# Patient Record
Sex: MALE | Race: BLACK OR AFRICAN AMERICAN
[De-identification: names, ages, dates, MRNs, and addresses within clinical notes are randomized per-mention and may not be internally consistent; named-entity substitution may affect disease eponyms.]

---

## 2017-12-09 ENCOUNTER — HOSPITAL ENCOUNTER (INPATIENT)
Dept: HOSPITAL 74 - YASAS | Age: 52
LOS: 4 days | Discharge: HOME | End: 2017-12-13
Attending: INTERNAL MEDICINE | Admitting: INTERNAL MEDICINE
Payer: COMMERCIAL

## 2017-12-09 VITALS — BODY MASS INDEX: 21.8 KG/M2

## 2017-12-09 DIAGNOSIS — J45.20: ICD-10-CM

## 2017-12-09 DIAGNOSIS — Z79.84: ICD-10-CM

## 2017-12-09 DIAGNOSIS — F10.24: ICD-10-CM

## 2017-12-09 DIAGNOSIS — E11.9: ICD-10-CM

## 2017-12-09 DIAGNOSIS — F10.230: Primary | ICD-10-CM

## 2017-12-09 DIAGNOSIS — F17.213: ICD-10-CM

## 2017-12-09 PROCEDURE — HZ2ZZZZ DETOXIFICATION SERVICES FOR SUBSTANCE ABUSE TREATMENT: ICD-10-PCS | Performed by: INTERNAL MEDICINE

## 2017-12-09 RX ADMIN — Medication SCH: at 23:22

## 2017-12-09 RX ADMIN — NICOTINE SCH MG: 21 PATCH TRANSDERMAL at 14:05

## 2017-12-09 RX ADMIN — METFORMIN HYDROCHLORIDE SCH: 500 TABLET ORAL at 23:22

## 2017-12-09 RX ADMIN — METFORMIN HYDROCHLORIDE SCH MG: 500 TABLET ORAL at 14:03

## 2017-12-09 NOTE — HP
CIWA Score





- CIWA Score


Nausea/Vomitin-Mild Nausea/No Vomiting


Muscle Tremors: 4-Moderate,w/Arms Extend


Anxiety: 4-Mod. Anxious/Guarded


Agitation: 1-Slight > Activity


Paroxysmal Sweats: 1-Minimal Palms Moist


Orientation: 2-Disoriented Date<2 days


Tacttile Disturbances: 1-Very Mild Itch/Numbness


Auditory Disturbances: 1-Very Mild


Visual Disturbances: 1-Very Mild Sensitivity


Headache: 2-Mild


CIWA-Ar Total Score: 18





Admission ROS S





- HPI


Chief Complaint: 


I need detox, I want to stop drinking


Allergies/Adverse Reactions: 


 Allergies











Allergy/AdvReac Type Severity Reaction Status Date / Time


 


No Known Allergies Allergy   Verified 17 12:13











History of Present Illness: 


53 yo gentleman here for detox from alcohol - unsure of when/where his last 

detox is, has black outs.  Was in [facility] ED for a fall - treated and sent for 

detox - laceration left eyebrow with steri-strips.  


Exam Limitations: Clinical Condition





- Ebola screening


Have you traveled outside of the country in the last 21 days: No


Have you had contact with anyone from an Ebola affected area: No


Have you been sick,other than usual withdrawal symptoms: No


Do you have a fever: No





- Review of Systems


Constitutional: Loss of Appetite, Malaise, Changes in sleep, Weakness


EENT: reports: Blurred Vision


Respiratory: reports: Cough


Cardiac: reports: No Symptoms Reported


GI: reports: Indigestion


: reports: Frequency


Musculoskeletal: reports: Back Pain, Muscle Pain


Integumentary: reports: Dryness


Neuro: reports: Headache


Endocrine: reports: No Symptoms Reported


Hematology: reports: No Symptoms Reported


Psychiatric: reports: Judgement Intact, Mood/Affect Appropiate, Anxious


Other Systems: Reviewed and Negative





Patient History





- Patient Medical History


Hx Anemia: No


Hx Asthma: Yes (ON ALBUTEROL)


Hx Chronic Obstructive Pulmonary Disease (COPD): No


Hx Cancer: No


Hx Cardiac Disorders: No


Hx Congestive Heart Failure: No


Hx Hypertension: No


Hx Hypercholesterolemia: No


Hx Pacemaker: No


HX Cerebrovascular Accident: No


Hx Seizures: No


Hx Dementia: No


Hx Diabetes: Yes


Hx Gastrointestinal Disorders: No


Hx Liver Disease: No


Hx Genitourinary Disorders: No


Hx Sexually Transmitted Disorders: No


Hx Renal Disease (ESRD): No


Hx Thyroid Disease: No


Hx Human Immunodeficiency Virus (HIV): No


Hx Hepatitis C: No


Hx Depression: Yes (not on meds)


Hx Suicide Attempt: No


Hx Bipolar Disorder: No


Hx Schizophrenia: No





- Patient Surgical History


Past Surgical History: No


Hx Neurologic Surgery: No


Hx Cataract Extraction: No


Hx Cardiac Surgery: No


Hx Lung Surgery: No


Hx Breast Surgery: No


Hx Breast Biopsy: No


Hx Abdominal Surgery: Yes (hernia as a child)


Hx Appendectomy: No


Hx Cholecystectomy: No


Hx Genitourinary Surgery: No


Hx  Section: No


Hx Orthopedic Surgery: No


Anesthesia Reaction: No





- PPD History


Previous Implant?: Yes


Documented Results: Negative w/proof


Date: 14


PPD to be Administered?: Yes





- Reproductive History


Patient is a Female of Child Bearing Age (11 -55 yrs old): No (male)





- Smoking Cessation


Smoking history: Current every day smoker


Have you smoked in the past 12 months: Yes


Aproximately how many cigarettes per day: 20


Cigars Per Day: 0


Hx Chewing Tobacco Use: No


Initiated information on smoking cessation: Yes


'Breaking Loose' booklet given: 17 (give on floor)





- Substance & Tx. History


Hx Alcohol Use: Yes


Hx Substance Use: No


Substance Use Type: Alcohol


Hx Substance Use Treatment: Yes





- Substances Abused


  ** Alcohol


Route: Oral


Frequency: Daily


Amount used: 2 pints liquor; two six packs of sixteen oz beers


Age of first use: 11


Date of Last Use: 17





Family Disease History





- Family Disease History


Family Disease History: Diabetes: Sister ( -  MVA), Other: Father (

etoh, , asthma), Mother (living, healthy), Sister





Admission Physical Exam [facility]





- Vital Signs


Vital Signs: 


 Vital Signs - 24 hr











  17





  11:23


 


Temperature 96.6 F L


 


Pulse Rate 97 H


 


Respiratory 18





Rate 


 


Blood Pressure 139/87














- Physical


General Appearance: Yes: Nourished, Appropriately Dressed, Mild Distress


HEENTM: Yes: Hearing grossly Normal, Normocephalic, Muffled/Hoarse Voice (low, 

muffled voice - states he has always been that way), Other (left eyebrow 

laceration/steristrips, conjunctiva mildly red)


Respiratory: Yes: No Respiratory Distress, Rhonchi


Neck: Yes: No masses,lesions,Nodules


Breast: Yes: Breast Exam Deferred


Cardiology: Yes: Regular Rhythm, Regular Rate


Abdominal: Yes: Flat


Genitourinary: Yes: Frequency


Back: Yes: Decreased Range of Motion, Other (kyphosis)


Musculoskeletal: Yes: Back pain


Extremities: Yes: Normal Inspection, Non-Tender


Neurological: Yes: Alert, Normal Mood/Affect, Normal Response


Integumentary: Yes: Normal Color, Dry


Lymphatic: Yes: Within Normal Limits





- Addiitonal


Findings: 


pfg=[id number]





- Diagnostic


(1) Alcohol dependence


Current Visit: Yes   Status: Acute   


Qualifiers: 


   Substance use status: uncomplicated   Qualified Code(s): F10.20 - Alcohol 

dependence, uncomplicated   





(2) Asthma


Current Visit: Yes   Status: Acute   


Qualifiers: 


   Asthma severity: moderate   Asthma persistence: persistent 





(3) DM (diabetes mellitus), type 2


Current Visit: Yes   Status: Acute   


Qualifiers: 


   Diabetes mellitus complication status: without complication   Diabetes 

mellitus long term insulin use: without long term use   Qualified Code(s): 

E11.9 - Type 2 diabetes mellitus without complications   





(4) Nicotine dependence


Current Visit: Yes   Status: Acute   


Qualifiers: 


   Nicotine product type: cigarettes   Substance use status: uncomplicated   

Qualified Code(s): F17.210 - Nicotine dependence, cigarettes, uncomplicated   





Cleared for Admission [facility]





- Detox or Rehab


[facility] Level of Care: Medically Managed


Detox Regimen/Protocol: Librium





[facility] Breath Alcohol Content


Breath Alcohol Content: 0.134





Urine Drug Screen





- Results


Drug Screen Negative: No


Urine Drug Screen Results: BZO-Benzodiazepines

## 2017-12-10 LAB
ALBUMIN SERPL-MCNC: 3.2 G/DL (ref 3.4–5)
ALP SERPL-CCNC: 50 U/L (ref 45–117)
ALT SERPL-CCNC: 21 U/L (ref 12–78)
ANION GAP SERPL CALC-SCNC: 3 MMOL/L (ref 8–16)
APPEARANCE UR: (no result)
AST SERPL-CCNC: 8 U/L (ref 15–37)
BILIRUB SERPL-MCNC: 0.4 MG/DL (ref 0.2–1)
BILIRUB UR STRIP.AUTO-MCNC: NEGATIVE MG/DL
CALCIUM SERPL-MCNC: 8.8 MG/DL (ref 8.5–10.1)
CO2 SERPL-SCNC: 37 MMOL/L (ref 21–32)
COLOR UR: (no result)
CREAT SERPL-MCNC: 0.7 MG/DL (ref 0.7–1.3)
DEPRECATED RDW RBC AUTO: 15.5 % (ref 11.9–15.9)
GLUCOSE SERPL-MCNC: 92 MG/DL (ref 74–106)
HIV 1 & 2 AB: NEGATIVE
HIV 1 AGP24: NEGATIVE
KETONES UR QL STRIP: (no result)
LEUKOCYTE ESTERASE UR QL STRIP.AUTO: NEGATIVE
LEUKOCYTE ESTERASE UR QL STRIP: NEGATIVE
MCH RBC QN AUTO: 28.2 PG (ref 25.7–33.7)
MCHC RBC AUTO-ENTMCNC: 31.9 G/DL (ref 32–35.9)
MCV RBC: 88.5 FL (ref 80–96)
NITRITE UR QL STRIP: NEGATIVE
PH UR: 5 [PH] (ref 5–8)
PLATELET # BLD AUTO: 317 K/MM3 (ref 134–434)
PMV BLD: 9.4 FL (ref 7.5–11.1)
PROT SERPL-MCNC: 5.9 G/DL (ref 6.4–8.2)
PROT UR QL STRIP: NEGATIVE
PROT UR QL STRIP: NEGATIVE
RBC # UR STRIP: NEGATIVE /UL
SP GR UR: 1.02 (ref 1–1.03)
UROBILINOGEN UR STRIP-MCNC: 2 MG/DL (ref 0.2–1)
WBC # BLD AUTO: 8.8 K/MM3 (ref 4–10)

## 2017-12-10 RX ADMIN — Medication SCH MG: at 22:42

## 2017-12-10 RX ADMIN — METFORMIN HYDROCHLORIDE SCH MG: 500 TABLET ORAL at 22:42

## 2017-12-10 RX ADMIN — NICOTINE SCH: 21 PATCH TRANSDERMAL at 11:10

## 2017-12-10 RX ADMIN — Medication SCH TAB: at 11:10

## 2017-12-10 RX ADMIN — METFORMIN HYDROCHLORIDE SCH MG: 500 TABLET ORAL at 11:11

## 2017-12-10 NOTE — EKG
Test Reason : 

Blood Pressure : ***/*** mmHG

Vent. Rate : 088 BPM     Atrial Rate : 088 BPM

   P-R Int : 114 ms          QRS Dur : 082 ms

    QT Int : 366 ms       P-R-T Axes : 070 074 063 degrees

   QTc Int : 442 ms

 

NORMAL SINUS RHYTHM WITH SHORT WV

NONSPECIFIC T WAVE ABNORMALITY

NO PREVIOUS ECGS AVAILABLE

Confirmed by ANNALEE CALDERON MD ([id number]) on 12/10/2017 8:57:08 AM

 

Referred By:             Confirmed By:ANNALEE CALDERON MD

## 2017-12-10 NOTE — PN
S CIWA





- CIWA Score


Nausea/Vomitin-No Nausea/No Vomiting


Muscle Tremors: 4-Moderate,w/Arms Extend


Anxiety: 4-Mod. Anxious/Guarded


Agitation: 4-Moderately Restless


Paroxysmal Sweats: 3


Orientation: 0-Oriented


Tacttile Disturbances: 0-None


Auditory Disturbances: 0-None


Visual Disturbances: 0-None


Headache: 3-Moderate


CIWA-Ar Total Score: 18





BHS Progress Note (SOAP)


Subjective: 





Tremor, sweating, headache, interrupted sleep


Objective: 





12/10/17 13:10


 Last Vital Signs











Temp Pulse Resp BP Pulse Ox


 


 97.1 F L  122 H  18   127/78    


 


 12/10/17 10:32  12/10/17 10:32  12/10/17 10:32  12/10/17 10:32   





pulse 122: tachycardia


 Laboratory Tests











  17





  12:25 14:30 22:00


 


WBC   


 


RBC   


 


Hgb   


 


Hct   


 


MCV   


 


MCH   


 


MCHC   


 


RDW   


 


Plt Count   


 


MPV   


 


Sodium   


 


Potassium   


 


Chloride   


 


Carbon Dioxide   


 


Anion Gap   


 


BUN   


 


Creatinine   


 


Creat Clearance w eGFR   


 


POC Glucometer  112   109


 


Random Glucose   


 


Calcium   


 


Total Bilirubin   


 


AST   


 


ALT   


 


Alkaline Phosphatase   


 


Total Protein   


 


Albumin   


 


Urine Color   Dkyellow 


 


Urine Appearance   Slcloudy 


 


Urine pH   5.0 


 


Ur Specific Gravity   1.025 


 


Urine Protein   Negative 


 


Urine Glucose (UA)   Negative 


 


Urine Ketones   Trace H 


 


Urine Blood   Negative 


 


Urine Nitrite   Negative 


 


Urine Bilirubin   Negative 


 


Urine Urobilinogen   2.0 


 


RPR Titer   


 


HIV 1&2 Antibody Screen   


 


HIV P24 Antigen   














  12/10/17 12/10/17 12/10/17





  06:13 08:00 08:00


 


WBC   8.8 


 


RBC   4.72 


 


Hgb   13.3 


 


Hct   41.8 


 


MCV   88.5 


 


MCH   28.2 


 


MCHC   31.9 L 


 


RDW   15.5 


 


Plt Count   317  D 


 


MPV   9.4 


 


Sodium    143


 


Potassium    4.0


 


Chloride    103


 


Carbon Dioxide    37 H


 


Anion Gap    3 L


 


BUN    23 H


 


Creatinine    0.7


 


Creat Clearance w eGFR    > 60


 


POC Glucometer  115  


 


Random Glucose    92  D


 


Calcium    8.8


 


Total Bilirubin    0.4  D


 


AST    8 L D


 


ALT    21


 


Alkaline Phosphatase    50


 


Total Protein    5.9 L


 


Albumin    3.2 L


 


Urine Color   


 


Urine Appearance   


 


Urine pH   


 


Ur Specific Gravity   


 


Urine Protein   


 


Urine Glucose (UA)   


 


Urine Ketones   


 


Urine Blood   


 


Urine Nitrite   


 


Urine Bilirubin   


 


Urine Urobilinogen   


 


RPR Titer   


 


HIV 1&2 Antibody Screen   


 


HIV P24 Antigen   














  12/10/17 12/10/17





  08:00 08:00


 


WBC  


 


RBC  


 


Hgb  


 


Hct  


 


MCV  


 


MCH  


 


MCHC  


 


RDW  


 


Plt Count  


 


MPV  


 


Sodium  


 


Potassium  


 


Chloride  


 


Carbon Dioxide  


 


Anion Gap  


 


BUN  


 


Creatinine  


 


Creat Clearance w eGFR  


 


POC Glucometer  


 


Random Glucose  


 


Calcium  


 


Total Bilirubin  


 


AST  


 


ALT  


 


Alkaline Phosphatase  


 


Total Protein  


 


Albumin  


 


Urine Color  


 


Urine Appearance  


 


Urine pH  


 


Ur Specific Gravity  


 


Urine Protein  


 


Urine Glucose (UA)  


 


Urine Ketones  


 


Urine Blood  


 


Urine Nitrite  


 


Urine Bilirubin  


 


Urine Urobilinogen  


 


RPR Titer  Nonreactive 


 


HIV 1&2 Antibody Screen   Negative


 


HIV P24 Antigen   Negative








Labs noted: bun 23


Assessment: 





12/10/17 13:12


Withdrawal symptoms





Noted with azotemia


Plan: 





Continue detox


Tachycardia most likely secondary to withdrawal symptoms or dehydration: 

encouraged to drink plenty of water, continue to monitor vital signs





Azotemia: encouraged to drink lots of water

## 2017-12-10 NOTE — CONSULT
[facility] Psychiatric Consult





- Data


Date of interview: 12/10/17


Admission source: [facility]


Identifying data: Mr Carrillo is a 52 years old single Black male, unemployed 

with no source of income, living in a residence on [address]


Substance Abuse History: Reports history of alcohol use. He started drinking 

alcohol at age 11, consumes 2 pints of liquor & 2x 6pk(16oz) of beer daily. 

Last drank on 12/9/17


Medical History: Significant for bronchial asthma, type 2 diabetes mellitus  

and history of surgery for umbilical hernia repair as a child. Smokes 

cigarettes 1ppd


Psychiatric History: Denies history of previous psychiatric treatment


Physical/Sexual Abuse/Trauma History: Denies history of verbal, physical or 

sexual abuse as well as DV relationship. No  service


Additional Comment: Denies criminal history





Mental Status Exam





- Mental Status Exam


Alert and Oriented to: Time (Canot tell month, day and date), Place ([place])

, Person


Patient Appearance: Disheveled


Mood: Depressed


Affect: Constricted


Patient Behavior: Cooperative


Speech Pattern: Clear


Voice Loudness: Normal


Thought Disorder: Not Present


Hallucinations: Denies


Suicidal Ideation: Denies


Homicidal Ideation: Denies


Insight/Judgement: Fair, Poor


Sleep: Fair


Appetite: Good


Muscle strength/Tone: Normal


Gait/Station: Normal





Psychiatric Findings





- Problem List (Axis 1, 2,3)


(1) Alcohol-induced mood disorder


Current Visit: Yes   Status: Acute   





(2) Alcohol dependence with uncomplicated withdrawal


Current Visit: Yes   Status: Acute   





(3) Nicotine dependence


Current Visit: Yes   Status: Acute   


Qualifiers: 


   Nicotine product type: cigarettes   Substance use status: uncomplicated   

Qualified Code(s): F17.210 - Nicotine dependence, cigarettes, uncomplicated   





(4) Asthma


Current Visit: Yes   Status: Acute   


Qualifiers: 


   Asthma severity: moderate   Asthma persistence: persistent 





(5) DM (diabetes mellitus), type 2


Current Visit: Yes   Status: Acute   


Qualifiers: 


   Diabetes mellitus complication status: without complication   Diabetes 

mellitus long term insulin use: without long term use   Qualified Code(s): 

E11.9 - Type 2 diabetes mellitus without complications   





- Initial Treatment Plan


Initial Treatment Plan: Continue inpatient detoxification

## 2017-12-11 RX ADMIN — Medication SCH TAB: at 10:33

## 2017-12-11 RX ADMIN — NICOTINE SCH: 21 PATCH TRANSDERMAL at 10:37

## 2017-12-11 RX ADMIN — Medication SCH: at 22:50

## 2017-12-11 RX ADMIN — METFORMIN HYDROCHLORIDE SCH MG: 500 TABLET ORAL at 10:33

## 2017-12-11 RX ADMIN — METFORMIN HYDROCHLORIDE SCH MG: 500 TABLET ORAL at 17:22

## 2017-12-11 NOTE — PN
[facility] CIWA





- CIWA Score


Nausea/Vomitin-No Nausea/No Vomiting


Muscle Tremors: 5


Anxiety: 4-Mod. Anxious/Guarded


Agitation: 2


Paroxysmal Sweats: 3


Orientation: 2-Disoriented Date<2 days


Tacttile Disturbances: 1-Very Mild Itch/Numbness


Auditory Disturbances: 0-None


Visual Disturbances: 0-None


Headache: 0-None Present


CIWA-Ar Total Score: 17





B[facility] Progress Note (SOAP)


Subjective: 





Tremors, Sweating, Anxious, Fatigue.


Objective: 


PT. A & O X 2 (UNCERTAIN ABOUT CURRENT DAY/ DATE). NO ACUTE DSITRESS.





17 12:09


 Vital Signs











Temperature  97.9 F   17 09:21


 


Pulse Rate  99 H  17 09:21


 


Respiratory Rate  18   17 09:21


 


Blood Pressure  117/79   17 09:21


 


O2 Sat by Pulse Oximetry (%)      








 Laboratory Tests











  17





  12:25 14:30 22:00


 


WBC   


 


RBC   


 


Hgb   


 


Hct   


 


MCV   


 


MCH   


 


MCHC   


 


RDW   


 


Plt Count   


 


MPV   


 


Sodium   


 


Potassium   


 


Chloride   


 


Carbon Dioxide   


 


Anion Gap   


 


BUN   


 


Creatinine   


 


Creat Clearance w eGFR   


 


POC Glucometer  112   109


 


Random Glucose   


 


Calcium   


 


Total Bilirubin   


 


AST   


 


ALT   


 


Alkaline Phosphatase   


 


Total Protein   


 


Albumin   


 


Urine Color   Dkyellow 


 


Urine Appearance   Slcloudy 


 


Urine pH   5.0 


 


Ur Specific Gravity   1.025 


 


Urine Protein   Negative 


 


Urine Glucose (UA)   Negative 


 


Urine Ketones   Trace H 


 


Urine Blood   Negative 


 


Urine Nitrite   Negative 


 


Urine Bilirubin   Negative 


 


Urine Urobilinogen   2.0 


 


Ur Leukocyte Esterase   Negative 


 


RPR Titer   


 


HIV 1&2 Antibody Screen   


 


HIV P24 Antigen   














  12/10/17 12/10/17 12/10/17





  06:13 08:00 08:00


 


WBC   8.8 


 


RBC   4.72 


 


Hgb   13.3 


 


Hct   41.8 


 


MCV   88.5 


 


MCH   28.2 


 


MCHC   31.9 L 


 


RDW   15.5 


 


Plt Count   317  D 


 


MPV   9.4 


 


Sodium    143


 


Potassium    4.0


 


Chloride    103


 


Carbon Dioxide    37 H


 


Anion Gap    3 L


 


BUN    23 H


 


Creatinine    0.7


 


Creat Clearance w eGFR    > 60


 


POC Glucometer  115  


 


Random Glucose    92  D


 


Calcium    8.8


 


Total Bilirubin    0.4  D


 


AST    8 L D


 


ALT    21


 


Alkaline Phosphatase    50


 


Total Protein    5.9 L


 


Albumin    3.2 L


 


Urine Color   


 


Urine Appearance   


 


Urine pH   


 


Ur Specific Gravity   


 


Urine Protein   


 


Urine Glucose (UA)   


 


Urine Ketones   


 


Urine Blood   


 


Urine Nitrite   


 


Urine Bilirubin   


 


Urine Urobilinogen   


 


Ur Leukocyte Esterase   


 


RPR Titer   


 


HIV 1&2 Antibody Screen   


 


HIV P24 Antigen   














  12/10/17 12/10/17 12/10/17





  08:00 08:00 20:53


 


WBC   


 


RBC   


 


Hgb   


 


Hct   


 


MCV   


 


MCH   


 


MCHC   


 


RDW   


 


Plt Count   


 


MPV   


 


Sodium   


 


Potassium   


 


Chloride   


 


Carbon Dioxide   


 


Anion Gap   


 


BUN   


 


Creatinine   


 


Creat Clearance w eGFR   


 


POC Glucometer    205


 


Random Glucose   


 


Calcium   


 


Total Bilirubin   


 


AST   


 


ALT   


 


Alkaline Phosphatase   


 


Total Protein   


 


Albumin   


 


Urine Color   


 


Urine Appearance   


 


Urine pH   


 


Ur Specific Gravity   


 


Urine Protein   


 


Urine Glucose (UA)   


 


Urine Ketones   


 


Urine Blood   


 


Urine Nitrite   


 


Urine Bilirubin   


 


Urine Urobilinogen   


 


Ur Leukocyte Esterase   


 


RPR Titer  Nonreactive  


 


HIV 1&2 Antibody Screen   Negative 


 


HIV P24 Antigen   Negative 














  17





  06:15


 


WBC 


 


RBC 


 


Hgb 


 


Hct 


 


MCV 


 


MCH 


 


MCHC 


 


RDW 


 


Plt Count 


 


MPV 


 


Sodium 


 


Potassium 


 


Chloride 


 


Carbon Dioxide 


 


Anion Gap 


 


BUN 


 


Creatinine 


 


Creat Clearance w eGFR 


 


POC Glucometer  100


 


Random Glucose 


 


Calcium 


 


Total Bilirubin 


 


AST 


 


ALT 


 


Alkaline Phosphatase 


 


Total Protein 


 


Albumin 


 


Urine Color 


 


Urine Appearance 


 


Urine pH 


 


Ur Specific Gravity 


 


Urine Protein 


 


Urine Glucose (UA) 


 


Urine Ketones 


 


Urine Blood 


 


Urine Nitrite 


 


Urine Bilirubin 


 


Urine Urobilinogen 


 


Ur Leukocyte Esterase 


 


RPR Titer 


 


HIV 1&2 Antibody Screen 


 


HIV P24 Antigen 








LABS NOTED.


Assessment: 





17 12:10


WITHDRAWAL SYMPTOMS.


Plan: 





CONTINUE DETOX.


INCREASE DAILY PO FLUID INTAKE.

## 2017-12-12 RX ADMIN — Medication SCH TAB: at 10:18

## 2017-12-12 RX ADMIN — Medication SCH: at 22:29

## 2017-12-12 RX ADMIN — NICOTINE SCH: 21 PATCH TRANSDERMAL at 10:19

## 2017-12-12 RX ADMIN — METFORMIN HYDROCHLORIDE SCH MG: 500 TABLET ORAL at 17:20

## 2017-12-12 RX ADMIN — METFORMIN HYDROCHLORIDE SCH MG: 500 TABLET ORAL at 06:42

## 2017-12-12 NOTE — PN
[facility]HS Progress Note (SOAP)


Subjective: 





tremor


sweat


irritable


Objective: 





12/12/17 09:24


 Vital Signs











Temperature  96.7 F L  12/12/17 09:12


 


Pulse Rate  84   12/12/17 09:12


 


Respiratory Rate  18   12/12/17 09:12


 


Blood Pressure  147/87   12/12/17 09:12


 


O2 Sat by Pulse Oximetry (%)      








 Laboratory Last Values











WBC  8.8 K/mm3 (4.0-10.0)   12/10/17  08:00    


 


RBC  4.72 M/mm3 (4.00-5.60)   12/10/17  08:00    


 


Hgb  13.3 GM/dL (11.7-16.9)   12/10/17  08:00    


 


Hct  41.8 % (35.4-49)   12/10/17  08:00    


 


MCV  88.5 fl (80-96)   12/10/17  08:00    


 


MCH  28.2 pg (25.7-33.7)   12/10/17  08:00    


 


MCHC  31.9 g/dl (32.0-35.9)  L  12/10/17  08:00    


 


RDW  15.5 % (11.9-15.9)   12/10/17  08:00    


 


Plt Count  317 K/MM3 (134-434)  D 12/10/17  08:00    


 


MPV  9.4 fl (7.5-11.1)   12/10/17  08:00    


 


Sodium  143 mmol/L (136-145)   12/10/17  08:00    


 


Potassium  4.0 mmol/L (3.5-5.1)   12/10/17  08:00    


 


Chloride  103 mmol/L ()   12/10/17  08:00    


 


Carbon Dioxide  37 mmol/L (21-32)  H  12/10/17  08:00    


 


Anion Gap  3  (8-16)  L  12/10/17  08:00    


 


BUN  23 mg/dL (7-18)  H  12/10/17  08:00    


 


Creatinine  0.7 mg/dL (0.7-1.3)   12/10/17  08:00    


 


Creat Clearance w eGFR  > 60  (>60)   12/10/17  08:00    


 


POC Glucometer  100 UNITS ()   12/12/17  05:59    


 


Random Glucose  92 mg/dL ()  D 12/10/17  08:00    


 


Calcium  8.8 mg/dL (8.5-10.1)   12/10/17  08:00    


 


Total Bilirubin  0.4 mg/dL (0.2-1.0)  D 12/10/17  08:00    


 


AST  8 U/L (15-37)  L D 12/10/17  08:00    


 


ALT  21 U/L (12-78)   12/10/17  08:00    


 


Alkaline Phosphatase  50 U/L ()   12/10/17  08:00    


 


Total Protein  5.9 g/dl (6.4-8.2)  L  12/10/17  08:00    


 


Albumin  3.2 g/dl (3.4-5.0)  L  12/10/17  08:00    


 


Urine Color  Dkyellow   12/09/17  14:30    


 


Urine Appearance  Slcloudy   12/09/17  14:30    


 


Urine pH  5.0  (5.0-8.0)   12/09/17  14:30    


 


Ur Specific Gravity  1.025  (1.001-1.035)   12/09/17  14:30    


 


Urine Protein  Negative  (NEGATIVE)   12/09/17  14:30    


 


Urine Glucose (UA)  Negative  (NEGATIVE)   12/09/17  14:30    


 


Urine Ketones  Trace  (NEGATIVE)  H  12/09/17  14:30    


 


Urine Blood  Negative  (NEGATIVE)   12/09/17  14:30    


 


Urine Nitrite  Negative  (NEGATIVE)   12/09/17  14:30    


 


Urine Bilirubin  Negative  (NEGATIVE)   12/09/17  14:30    


 


Urine Urobilinogen  2.0 mg/dL (0.2-1.0)   12/09/17  14:30    


 


Ur Leukocyte Esterase  Negative  (NEGATIVE)   12/09/17  14:30    


 


RPR Titer  Nonreactive  (NONREACTIVE)   12/10/17  08:00    


 


HIV 1&2 Antibody Screen  Negative   12/10/17  08:00    


 


HIV P24 Antigen  Negative   12/10/17  08:00    








lab noted


Assessment: 





12/12/17 09:25


withdrawal sx 


Plan: 





observation with detox regimen

## 2017-12-13 VITALS — HEART RATE: 69 BPM | SYSTOLIC BLOOD PRESSURE: 104 MMHG | DIASTOLIC BLOOD PRESSURE: 67 MMHG

## 2017-12-13 RX ADMIN — METFORMIN HYDROCHLORIDE SCH MG: 500 TABLET ORAL at 08:00

## 2017-12-13 NOTE — DS
[facility] Detox Discharge Summary


Admission Date: 


12/09/17





Discharge Date: 12/13/17





- History


Present History: Alcohol Dependence


Additional Comments: 





DETOX COMPLETED. ALERT O X 3. PT WAS PICKED UP TODAY BY [facility] 

TRANSPORTATION FOR AFTER CARE.


ADDENDUM:PT UNABLE TO  FILL NEW  RX  TO GO TO  REHAB DUE TO RECENT RX  

LESS THAN 30 DAYS PER INSURANCE PROTOCOL.


Pertinent Past History: 





TYPE 2 DM


ASTHMA





- Physical Exam Results


Vital Signs: 


 Vital Signs











Temperature  9.9 F L  12/13/17 09:51


 


Pulse Rate  69   12/13/17 09:51


 


Respiratory Rate  18   12/13/17 09:51


 


Blood Pressure  104/67   12/13/17 09:51


 


O2 Sat by Pulse Oximetry (%)      











Pertinent Admission Physical Exam Findings: 





WITHDRAWAL SX


 Laboratory Last Values











WBC  8.8 K/mm3 (4.0-10.0)   12/10/17  08:00    


 


RBC  4.72 M/mm3 (4.00-5.60)   12/10/17  08:00    


 


Hgb  13.3 GM/dL (11.7-16.9)   12/10/17  08:00    


 


Hct  41.8 % (35.4-49)   12/10/17  08:00    


 


MCV  88.5 fl (80-96)   12/10/17  08:00    


 


MCH  28.2 pg (25.7-33.7)   12/10/17  08:00    


 


MCHC  31.9 g/dl (32.0-35.9)  L  12/10/17  08:00    


 


RDW  15.5 % (11.9-15.9)   12/10/17  08:00    


 


Plt Count  317 K/MM3 (134-434)  D 12/10/17  08:00    


 


MPV  9.4 fl (7.5-11.1)   12/10/17  08:00    


 


Sodium  143 mmol/L (136-145)   12/10/17  08:00    


 


Potassium  4.0 mmol/L (3.5-5.1)   12/10/17  08:00    


 


Chloride  103 mmol/L ()   12/10/17  08:00    


 


Carbon Dioxide  37 mmol/L (21-32)  H  12/10/17  08:00    


 


Anion Gap  3  (8-16)  L  12/10/17  08:00    


 


BUN  23 mg/dL (7-18)  H  12/10/17  08:00    


 


Creatinine  0.7 mg/dL (0.7-1.3)   12/10/17  08:00    


 


Creat Clearance w eGFR  > 60  (>60)   12/10/17  08:00    


 


POC Glucometer  90 UNITS ()   12/13/17  07:18    


 


Random Glucose  92 mg/dL ()  D 12/10/17  08:00    


 


Calcium  8.8 mg/dL (8.5-10.1)   12/10/17  08:00    


 


Total Bilirubin  0.4 mg/dL (0.2-1.0)  D 12/10/17  08:00    


 


AST  8 U/L (15-37)  L D 12/10/17  08:00    


 


ALT  21 U/L (12-78)   12/10/17  08:00    


 


Alkaline Phosphatase  50 U/L ()   12/10/17  08:00    


 


Total Protein  5.9 g/dl (6.4-8.2)  L  12/10/17  08:00    


 


Albumin  3.2 g/dl (3.4-5.0)  L  12/10/17  08:00    


 


Urine Color  Dkyellow   12/09/17  14:30    


 


Urine Appearance  Slcloudy   12/09/17  14:30    


 


Urine pH  5.0  (5.0-8.0)   12/09/17  14:30    


 


Ur Specific Gravity  1.025  (1.001-1.035)   12/09/17  14:30    


 


Urine Protein  Negative  (NEGATIVE)   12/09/17  14:30    


 


Urine Glucose (UA)  Negative  (NEGATIVE)   12/09/17  14:30    


 


Urine Ketones  Trace  (NEGATIVE)  H  12/09/17  14:30    


 


Urine Blood  Negative  (NEGATIVE)   12/09/17  14:30    


 


Urine Nitrite  Negative  (NEGATIVE)   12/09/17  14:30    


 


Urine Bilirubin  Negative  (NEGATIVE)   12/09/17  14:30    


 


Urine Urobilinogen  2.0 mg/dL (0.2-1.0)   12/09/17  14:30    


 


Ur Leukocyte Esterase  Negative  (NEGATIVE)   12/09/17  14:30    


 


RPR Titer  Nonreactive  (NONREACTIVE)   12/10/17  08:00    


 


HIV 1&2 Antibody Screen  Negative   12/10/17  08:00    


 


HIV P24 Antigen  Negative   12/10/17  08:00    














- Treatment


Hospital Course: Detox Protocol Followed, Detoxed Safely, Responded well, 

Discharged Condition Good, Rehab Referral Accepted


Patient has Accepted a Rehab Referral to: [facility]





- Medication


Discharge Medications: 


Ambulatory Orders





Albuterol Sulfate Inhaler - [Ventolin HFA Inhaler -] 2 inh PO Q4H PRN #1 

inhaler 12/13/17 


Metformin HCl [Glucophage -] 500 mg PO BID #60 tablet 12/13/17 











- AMA


Did Patient Leave Against Medical Advice: No

## 2018-02-17 ENCOUNTER — HOSPITAL ENCOUNTER (INPATIENT)
Dept: HOSPITAL 74 - YASAS | Age: 53
LOS: 4 days | Discharge: HOME | End: 2018-02-21
Attending: INTERNAL MEDICINE | Admitting: INTERNAL MEDICINE
Payer: COMMERCIAL

## 2018-02-17 VITALS — BODY MASS INDEX: 21.6 KG/M2

## 2018-02-17 DIAGNOSIS — F10.24: ICD-10-CM

## 2018-02-17 DIAGNOSIS — R55: ICD-10-CM

## 2018-02-17 DIAGNOSIS — J45.40: ICD-10-CM

## 2018-02-17 DIAGNOSIS — Z79.84: ICD-10-CM

## 2018-02-17 DIAGNOSIS — F10.230: Primary | ICD-10-CM

## 2018-02-17 DIAGNOSIS — E11.9: ICD-10-CM

## 2018-02-17 DIAGNOSIS — H54.8: ICD-10-CM

## 2018-02-17 DIAGNOSIS — F17.213: ICD-10-CM

## 2018-02-17 LAB
APPEARANCE UR: CLEAR
BILIRUB UR STRIP.AUTO-MCNC: NEGATIVE MG/DL
COLOR UR: (no result)
EPITH CASTS URNS QL MICRO: (no result) /HPF
HYALINE CASTS URNS QL MICRO: 2 /LPF
KETONES UR QL STRIP: (no result)
LEUKOCYTE ESTERASE UR QL STRIP.AUTO: NEGATIVE
MUCOUS THREADS URNS QL MICRO: (no result)
NITRITE UR QL STRIP: NEGATIVE
PH UR: 5 [PH] (ref 5–8)
PROT UR QL STRIP: (no result)
PROT UR QL STRIP: NEGATIVE
RBC # UR STRIP: NEGATIVE /UL
SP GR UR: 1.03 (ref 1–1.03)
UROBILINOGEN UR STRIP-MCNC: 2 MG/DL (ref 0.2–1)

## 2018-02-17 PROCEDURE — HZ2ZZZZ DETOXIFICATION SERVICES FOR SUBSTANCE ABUSE TREATMENT: ICD-10-PCS | Performed by: INTERNAL MEDICINE

## 2018-02-17 RX ADMIN — METFORMIN HYDROCHLORIDE SCH MG: 500 TABLET ORAL at 17:25

## 2018-02-17 RX ADMIN — Medication SCH MG: at 22:22

## 2018-02-17 NOTE — HP
CIWA Score





- CIWA Score


Nausea/Vomitin-Mild Nausea/No Vomiting


Muscle Tremors: 4-Moderate,w/Arms Extend


Anxiety: 4-Mod. Anxious/Guarded


Agitation: 1-Slight > Activity


Paroxysmal Sweats: 1-Minimal Palms Moist


Orientation: 1-Uncertain about Date


Tacttile Disturbances: 1-Very Mild Itch/Numbness


Auditory Disturbances: 1-Very Mild


Visual Disturbances: 1-Very Mild Sensitivity


Headache: 2-Mild


CIWA-Ar Total Score: 17





Admission ROS S





- HPI


Chief Complaint: 


I'm tired of suffering, I want help to stop drinking


Allergies/Adverse Reactions: 


 Allergies











Allergy/AdvReac Type Severity Reaction Status Date / Time


 


No Known Allergies Allergy   Verified 18 14:51











History of Present Illness: 


51 yo gentleman here for detox from alcohol - denies using any other substance 

although pcp, thc and bzo found in urine tox.  No seizures, does have black 

outs. Scabbed areas on cheek - states he fell a few days ago due to 

intoxication but did not seek care.


Exam Limitations: No Limitations





- Ebola screening


Have you traveled outside of the country in the last 21 days: No (N)


Have you had contact with anyone from an Ebola affected area: No


Have you been sick,other than usual withdrawal symptoms: No


Do you have a fever: No





- Review of Systems


Constitutional: Loss of Appetite, Malaise, Changes in sleep, Weakness


EENT: reports: Blurred Vision, Nose Congestion


Respiratory: reports: SOB with Exertion


Cardiac: reports: No Symptoms Reported


GI: reports: Nausea, Poor Appetite, Indigestion


: reports: Frequency


Musculoskeletal: reports: No Symptoms Reported


Integumentary: reports: No Symptoms Reported


Neuro: reports: Headache, Tremors


Endocrine: reports: No Symptoms Reported


Hematology: reports: No Symptoms Reported


Psychiatric: reports: Judgement Intact, Mood/Affect Appropiate, Anxious


Other Systems: Reviewed and Negative





Patient History





- Patient Medical History


Hx Anemia: No


Hx Asthma: Yes (ON ALBUTEROL)


Hx Chronic Obstructive Pulmonary Disease (COPD): Yes


Hx Cancer: No


Hx Cardiac Disorders: No


Hx Congestive Heart Failure: No


Hx Hypertension: No


Hx Hypercholesterolemia: Yes (poor adherence to meds)


Hx Pacemaker: No


HX Cerebrovascular Accident: No


Hx Seizures: No


Hx Dementia: No


Hx Diabetes: Yes (on meds)


Hx Gastrointestinal Disorders: No


Hx Liver Disease: No


Hx Genitourinary Disorders: No


Hx Sexually Transmitted Disorders: No


Hx Renal Disease (ESRD): No


Hx Thyroid Disease: No


Hx Human Immunodeficiency Virus (HIV): No


Hx Hepatitis C: No


Hx Depression: Yes (not on meds)


Hx Suicide Attempt: No


Hx Bipolar Disorder: No


Hx Schizophrenia: No


Other Medical History: legally blind left eye





- Patient Surgical History


Past Surgical History: No


Hx Neurologic Surgery: No


Hx Cataract Extraction: No


Hx Cardiac Surgery: No


Hx Lung Surgery: No


Hx Breast Surgery: No


Hx Breast Biopsy: No


Hx Abdominal Surgery: Yes (hernia as a child)


Hx Appendectomy: No


Hx Cholecystectomy: No


Hx Genitourinary Surgery: No


Hx  Section: No


Hx Orthopedic Surgery: No


Other Surgical History: detached retina surgery years ago


Anesthesia Reaction: No





- PPD History


Previous Implant?: Yes


Documented Results: Negative w/proof


Implanted On Prior [facility] Admission?: Yes


Date: 17


Results: 0mm


PPD to be Administered?: No





- Reproductive History


Patient is a Female of Child Bearing Age (11 -55 yrs old): No (male)





- Smoking Cessation


Smoking history: Current every day smoker


Have you smoked in the past 12 months: Yes


Aproximately how many cigarettes per day: 20


Cigars Per Day: 0


Hx Chewing Tobacco Use: No


Initiated information on smoking cessation: Yes


'Breaking Loose' booklet given: 18 (give on floor)





- Substance & Tx. History


Hx Alcohol Use: Yes





- Substances Abused


  ** Alcohol


Route: Oral


Frequency: Daily


Amount used: two six packs 12 oz beer; 1 pint liquor


Age of first use: 13


Date of Last Use: 18





Family Disease History





- Family Disease History


Family Disease History: Diabetes: Sister ( -  MVA), Other: Father (

etoh, , asthma), Mother (living, healthy), Sister





Admission Physical Exam [facility]





- Vital Signs


Vital Signs: 


 Vital Signs - 24 hr











  18





  12:09


 


Temperature 98.3 F


 


Pulse Rate 88


 


Respiratory 16





Rate 


 


Blood Pressure 143/91














- Physical


General Appearance: Yes: Appropriately Dressed, Mild Distress, Thin, Tremorous, 

Anxious


HEENTM: Yes: Hearing grossly Normal, Normocephalic, Normal Voice, Muffled/

Hoarse Voice, Other (both eye with red conjunctiva (no exudate))


Respiratory: Yes: No Respiratory Distress, Rhonchi


Neck: Yes: No masses,lesions,Nodules, Supple


Breast: Yes: Breast Exam Deferred


Cardiology: Yes: Regular Rhythm, Regular Rate


Abdominal: Yes: Flat


Genitourinary: Yes: Frequency


Back: Yes: Normal Inspection


Musculoskeletal: Yes: full range of Motion, Gait Steady


Neurological: Yes: Alert, Normal Mood/Affect, Normal Response


Integumentary: Yes: Normal Color, Warm, Other (right cheek with 2cm scabbed area

, left eyebrow with scabbing)


Lymphatic: Yes: Within Normal Limits





- Addiitonal


Findings: 





bgm=95





- Diagnostic


(1) Alcohol dependence with uncomplicated withdrawal


Current Visit: Yes   Status: Acute   





(2) Nicotine dependence


Current Visit: Yes   Status: Acute   


Qualifiers: 


   Nicotine product type: cigarettes   Substance use status: in withdrawal   

Qualified Code(s): F17.213 - Nicotine dependence, cigarettes, with withdrawal   





(3) Legally blind in left eye, as defined in [place]


Current Visit: Yes   Status: Acute   





(4) Asthma


Current Visit: Yes   Status: Chronic   


Qualifiers: 


   Asthma severity: moderate   Asthma persistence: persistent 





(5) DM (diabetes mellitus), type 2


Current Visit: Yes   Status: Chronic   


Qualifiers: 


   Diabetes mellitus complication status: without complication   Diabetes 

mellitus long term insulin use: without long term use   Qualified Code(s): 

E11.9 - Type 2 diabetes mellitus without complications   





(6) Syncope


Current Visit: Yes   Status: Acute   


Qualifiers: 


   Syncope type: unspecified   Qualified Code(s): R55 - Syncope and collapse   





Cleared for Admission [facility]





- Detox or Rehab


[facility] Level of Care: Medically Managed


Detox Regimen/Protocol: Librium





[facility] Breath Alcohol Content


Breath Alcohol Content: 0





Urine Drug Screen





- Results


Urine Drug Screen Results: THC-Marijuana, MET-Methamphetamine, PCP-Phencyclidine

, BZO-Benzodiazepines

## 2018-02-18 LAB
ALBUMIN SERPL-MCNC: 3 G/DL (ref 3.4–5)
ALP SERPL-CCNC: 54 U/L (ref 45–117)
ALT SERPL-CCNC: 32 U/L (ref 12–78)
ANION GAP SERPL CALC-SCNC: 5 MMOL/L (ref 8–16)
AST SERPL-CCNC: 25 U/L (ref 15–37)
BILIRUB SERPL-MCNC: 0.4 MG/DL (ref 0.2–1)
BUN SERPL-MCNC: 18 MG/DL (ref 7–18)
CALCIUM SERPL-MCNC: 8.8 MG/DL (ref 8.5–10.1)
CHLORIDE SERPL-SCNC: 106 MMOL/L (ref 98–107)
CO2 SERPL-SCNC: 35 MMOL/L (ref 21–32)
CREAT SERPL-MCNC: 0.6 MG/DL (ref 0.7–1.3)
DEPRECATED RDW RBC AUTO: 16.5 % (ref 11.9–15.9)
GLUCOSE SERPL-MCNC: 103 MG/DL (ref 74–106)
HCT VFR BLD CALC: 37.4 % (ref 35.4–49)
HGB BLD-MCNC: 11.9 GM/DL (ref 11.7–16.9)
MCH RBC QN AUTO: 28.6 PG (ref 25.7–33.7)
MCHC RBC AUTO-ENTMCNC: 31.9 G/DL (ref 32–35.9)
MCV RBC: 89.5 FL (ref 80–96)
PLATELET # BLD AUTO: 266 K/MM3 (ref 134–434)
PMV BLD: 9.7 FL (ref 7.5–11.1)
POTASSIUM SERPLBLD-SCNC: 3.7 MMOL/L (ref 3.5–5.1)
PROT SERPL-MCNC: 5.6 G/DL (ref 6.4–8.2)
RBC # BLD AUTO: 4.17 M/MM3 (ref 4–5.6)
SODIUM SERPL-SCNC: 146 MMOL/L (ref 136–145)
WBC # BLD AUTO: 10.9 K/MM3 (ref 4–10)

## 2018-02-18 RX ADMIN — TIOTROPIUM BROMIDE SCH PUFF: 18 CAPSULE ORAL; RESPIRATORY (INHALATION) at 17:46

## 2018-02-18 RX ADMIN — METFORMIN HYDROCHLORIDE SCH MG: 500 TABLET ORAL at 17:25

## 2018-02-18 RX ADMIN — Medication SCH TAB: at 10:29

## 2018-02-18 RX ADMIN — BUDESONIDE AND FORMOTEROL FUMARATE DIHYDRATE SCH PUFF: 160; 4.5 AEROSOL RESPIRATORY (INHALATION) at 22:55

## 2018-02-18 RX ADMIN — METFORMIN HYDROCHLORIDE SCH MG: 500 TABLET ORAL at 07:02

## 2018-02-18 NOTE — CONSULT
[facility] Psychiatric Consult





- Data


Date of interview: 02/18/18


Admission source: Nursing Home(he does not recall name of that institution)


Identifying data: Mr Carrillo is a 52 years old single Black male, unemployed 

on SSI, living in a NH seeking detox treatment for alcohol


Substance Abuse History: Reports history of alcohol use. He started drinking 

alcohol at age 13, consumes one pint of liquor & 2x 6pk(12oz) of beer daily. 

Last drank on 2/16/18


Medical History: Significant for bronchial asthma, hyperlipidemia, diabetes 

mellitus and history of syrgery for hernia repair(as a child) and eye surgery 

for detached retina years ago(legally blind left eye). Smokes cigarettes 1ppd


Psychiatric History: Denies history of previous psychiatric treatment. However, 

Abilify 10 mg po daily is listed as one of his home medication


Physical/Sexual Abuse/Trauma History: Denies history of emotional, physical or 

sexual abuse as well as DV relationship


Additional Comment: Denies criminal history





Mental Status Exam





- Mental Status Exam


Alert and Oriented to: Time (partially oriented to time), Place ([place]), Person


Cognitive Function: Fair


Patient Appearance: Well Groomed


Mood: Depressed


Affect: Appropriate


Patient Behavior: Cooperative


Speech Pattern: Clear


Voice Loudness: Normal


Thought Process: Intact, Goal Oriented


Hallucinations: Denies


Suicidal Ideation: Denies


Homicidal Ideation: Denies


Insight/Judgement: Poor


Sleep: Poorly


Appetite: Good


Muscle strength/Tone: Normal


Gait/Station: Normal





Psychiatric Findings





- Problem List (Axis 1, 2,3)


(1) Alcohol-induced mood disorder


Current Visit: Yes   Status: Acute   





(2) Alcohol dependence with uncomplicated withdrawal


Current Visit: Yes   Status: Acute   





(3) Nicotine dependence


Current Visit: Yes   Status: Chronic   


Qualifiers: 


   Nicotine product type: cigarettes   Substance use status: in withdrawal   

Qualified Code(s): F17.213 - Nicotine dependence, cigarettes, with withdrawal   





(4) Legally blind in left eye, as defined in [place]


Current Visit: Yes   Status: Acute   





(5) Asthma


Current Visit: Yes   Status: Chronic   


Qualifiers: 


   Asthma severity: moderate   Asthma persistence: persistent 





(6) DM (diabetes mellitus), type 2


Current Visit: Yes   Status: Chronic   


Qualifiers: 


   Diabetes mellitus complication status: without complication   Diabetes 

mellitus long term insulin use: without long term use   Qualified Code(s): 

E11.9 - Type 2 diabetes mellitus without complications   





- Initial Treatment Plan


Initial Treatment Plan: 1) Continue inpatient detoxification.  2) Research NH, 

get consent from patient to call NH about psychiatric history

## 2018-02-18 NOTE — EKG
Test Reason : 

Blood Pressure : ***/*** mmHG

Vent. Rate : 076 BPM     Atrial Rate : 076 BPM

   P-R Int : 104 ms          QRS Dur : 088 ms

    QT Int : 400 ms       P-R-T Axes : 071 074 062 degrees

   QTc Int : 450 ms

 

SINUS RHYTHM WITH SHORT DC

OTHERWISE NORMAL ECG

WHEN COMPARED WITH ECG OF 09-DEC-2017 15:10,

NO SIGNIFICANT CHANGE WAS FOUND

Confirmed by JEFFERSON CARBONE MD ([id number]) on 2/18/2018 11:13:05 AM

 

Referred By:             Confirmed By:JEFFERSON CARBONE MD

## 2018-02-18 NOTE — PN
S CIWA





- CIWA Score


Nausea/Vomitin-Mild Nausea/No Vomiting


Muscle Tremors: 4-Moderate,w/Arms Extend


Anxiety: 3


Agitation: 3


Paroxysmal Sweats: 1-Minimal Palms Moist


Orientation: 0-Oriented


Tacttile Disturbances: 1-Very Mild Itch/Numbness


Auditory Disturbances: 0-None


Visual Disturbances: 0-None


Headache: 1-Very Mild


CIWA-Ar Total Score: 14





[facility] Progress Note (SOAP)


Subjective: 





sweat


GI distress


tremor


irritable


agitation


anxiety


Objective: 





18 11:33


 Vital Signs











Temperature  97.1 F L  18 09:49


 


Pulse Rate  97 H  18 09:49


 


Respiratory Rate  20   18 09:49


 


Blood Pressure  113/70   18 09:49


 


O2 Sat by Pulse Oximetry (%)      








 Laboratory Last Values











WBC  10.9 K/mm3 (4.0-10.0)  H  18  08:00    


 


RBC  4.17 M/mm3 (4.00-5.60)   18  08:00    


 


Hgb  11.9 GM/dL (11.7-16.9)  D 18  08:00    


 


Hct  37.4 % (35.4-49)   18  08:00    


 


MCV  89.5 fl (80-96)   18  08:00    


 


MCH  28.6 pg (25.7-33.7)   18  08:00    


 


MCHC  31.9 g/dl (32.0-35.9)  L  18  08:00    


 


RDW  16.5 % (11.9-15.9)  H  18  08:00    


 


Plt Count  266 K/MM3 (134-434)   18  08:00    


 


MPV  9.7 fl (7.5-11.1)   18  08:00    


 


Sodium  146 mmol/L (136-145)  H  18  08:00    


 


Potassium  3.7 mmol/L (3.5-5.1)   18  08:00    


 


Chloride  106 mmol/L ()   18  08:00    


 


Carbon Dioxide  35 mmol/L (21-32)  H  18  08:00    


 


Anion Gap  5  (8-16)  L  18  08:00    


 


BUN  18 mg/dL (7-18)  D 18  08:00    


 


Creatinine  0.6 mg/dL (0.7-1.3)  L  18  08:00    


 


Creat Clearance w eGFR  > 60  (>60)   18  08:00    


 


POC Glucometer  107 UNITS ()   18  06:34    


 


Random Glucose  103 mg/dL ()   18  08:00    


 


Calcium  8.8 mg/dL (8.5-10.1)   18  08:00    


 


Total Bilirubin  0.4 mg/dL (0.2-1.0)   18  08:00    


 


AST  25 U/L (15-37)  D 18  08:00    


 


ALT  32 U/L (12-78)  D 18  08:00    


 


Alkaline Phosphatase  54 U/L ()   18  08:00    


 


Total Protein  5.6 g/dl (6.4-8.2)  L  18  08:00    


 


Albumin  3.0 g/dl (3.4-5.0)  L  18  08:00    


 


Urine Color  Dkyellow   18  16:00    


 


Urine Appearance  Clear   18  16:00    


 


Urine pH  5.0  (5.0-8.0)   18  16:00    


 


Ur Specific Gravity  1.031  (1.001-1.035)   18  16:00    


 


Urine Protein  1+  (NEGATIVE)  H  18  16:00    


 


Urine Glucose (UA)  Negative  (NEGATIVE)   18  16:00    


 


Urine Ketones  Trace  (NEGATIVE)  H  18  16:00    


 


Urine Blood  Negative  (NEGATIVE)   18  16:00    


 


Urine Nitrite  Negative  (NEGATIVE)   18  16:00    


 


Urine Bilirubin  Negative  (NEGATIVE)   18  16:00    


 


Urine Urobilinogen  2.0 mg/dL (0.2-1.0)   18  16:00    


 


Ur Leukocyte Esterase  Negative  (NEGATIVE)   18  16:00    


 


Urine WBC (Auto)  1 /hpf (3-5)   18  16:00    


 


Urine RBC (Auto)  1 /hpf (0-3)   18  16:00    


 


Ur Epithelial Cells  Rare /HPF (FEW)   18  16:00    


 


Hyaline Casts  2 /lpf  18  16:00    


 


Urine Mucus  Many   18  16:00    








lab noted


Assessment: 





18 11:33


withdrawal sx


Plan: 





continue detox

## 2018-02-19 RX ADMIN — METFORMIN HYDROCHLORIDE SCH MG: 500 TABLET ORAL at 06:48

## 2018-02-19 RX ADMIN — ASPIRIN 81 MG SCH MG: 81 TABLET ORAL at 10:19

## 2018-02-19 RX ADMIN — ATORVASTATIN CALCIUM SCH MG: 40 TABLET, FILM COATED ORAL at 22:04

## 2018-02-19 RX ADMIN — TIOTROPIUM BROMIDE SCH PUFF: 18 CAPSULE ORAL; RESPIRATORY (INHALATION) at 10:20

## 2018-02-19 RX ADMIN — ATORVASTATIN CALCIUM SCH: 40 TABLET, FILM COATED ORAL at 00:20

## 2018-02-19 RX ADMIN — Medication SCH TAB: at 10:19

## 2018-02-19 RX ADMIN — Medication SCH MG: at 22:04

## 2018-02-19 RX ADMIN — Medication SCH: at 00:18

## 2018-02-19 RX ADMIN — LISINOPRIL SCH MG: 5 TABLET ORAL at 10:19

## 2018-02-19 RX ADMIN — BUDESONIDE AND FORMOTEROL FUMARATE DIHYDRATE SCH PUFF: 160; 4.5 AEROSOL RESPIRATORY (INHALATION) at 10:20

## 2018-02-19 RX ADMIN — METFORMIN HYDROCHLORIDE SCH MG: 500 TABLET ORAL at 17:25

## 2018-02-19 RX ADMIN — BUDESONIDE AND FORMOTEROL FUMARATE DIHYDRATE SCH PUFF: 160; 4.5 AEROSOL RESPIRATORY (INHALATION) at 22:55

## 2018-02-19 NOTE — PN
S CIWA





- CIWA Score


Nausea/Vomiting: 3


Muscle Tremors: 3


Anxiety: 3


Agitation: 2


Paroxysmal Sweats: 1-Minimal Palms Moist


Orientation: 0-Oriented


Tacttile Disturbances: 1-Very Mild Itch/Numbness


Auditory Disturbances: 1-Very Mild


Visual Disturbances: 0-None


Headache: 2-Mild


CIWA-Ar Total Score: 16





[facility] Progress Note (SOAP)


Subjective: 





ALERT,IRRITABLE,ANXIOUS,INTERRUPTED SLEEP,TREMOR


Objective: 





02/19/18 09:55


 Vital Signs











Temperature  98.3 F   02/19/18 06:37


 


Pulse Rate  73   02/19/18 06:37


 


Respiratory Rate  18   02/19/18 06:37


 


Blood Pressure  135/78   02/19/18 06:37


 


O2 Sat by Pulse Oximetry (%)      











02/19/18 09:56


 Laboratory Last Values











WBC  10.9 K/mm3 (4.0-10.0)  H  02/18/18  08:00    


 


RBC  4.17 M/mm3 (4.00-5.60)   02/18/18  08:00    


 


Hgb  11.9 GM/dL (11.7-16.9)  D 02/18/18  08:00    


 


Hct  37.4 % (35.4-49)   02/18/18  08:00    


 


MCV  89.5 fl (80-96)   02/18/18  08:00    


 


MCH  28.6 pg (25.7-33.7)   02/18/18  08:00    


 


MCHC  31.9 g/dl (32.0-35.9)  L  02/18/18  08:00    


 


RDW  16.5 % (11.9-15.9)  H  02/18/18  08:00    


 


Plt Count  266 K/MM3 (134-434)   02/18/18  08:00    


 


MPV  9.7 fl (7.5-11.1)   02/18/18  08:00    


 


Sodium  146 mmol/L (136-145)  H  02/18/18  08:00    


 


Potassium  3.7 mmol/L (3.5-5.1)   02/18/18  08:00    


 


Chloride  106 mmol/L ()   02/18/18  08:00    


 


Carbon Dioxide  35 mmol/L (21-32)  H  02/18/18  08:00    


 


Anion Gap  5  (8-16)  L  02/18/18  08:00    


 


BUN  18 mg/dL (7-18)  D 02/18/18  08:00    


 


Creatinine  0.6 mg/dL (0.7-1.3)  L  02/18/18  08:00    


 


Creat Clearance w eGFR  > 60  (>60)   02/18/18  08:00    


 


POC Glucometer  102 UNITS ()   02/19/18  06:32    


 


Random Glucose  103 mg/dL ()   02/18/18  08:00    


 


Calcium  8.8 mg/dL (8.5-10.1)   02/18/18  08:00    


 


Total Bilirubin  0.4 mg/dL (0.2-1.0)   02/18/18  08:00    


 


AST  25 U/L (15-37)  D 02/18/18  08:00    


 


ALT  32 U/L (12-78)  D 02/18/18  08:00    


 


Alkaline Phosphatase  54 U/L ()   02/18/18  08:00    


 


Total Protein  5.6 g/dl (6.4-8.2)  L  02/18/18  08:00    


 


Albumin  3.0 g/dl (3.4-5.0)  L  02/18/18  08:00    


 


Urine Color  Dkyellow   02/17/18  16:00    


 


Urine Appearance  Clear   02/17/18  16:00    


 


Urine pH  5.0  (5.0-8.0)   02/17/18  16:00    


 


Ur Specific Gravity  1.031  (1.001-1.035)   02/17/18  16:00    


 


Urine Protein  1+  (NEGATIVE)  H  02/17/18  16:00    


 


Urine Glucose (UA)  Negative  (NEGATIVE)   02/17/18  16:00    


 


Urine Ketones  Trace  (NEGATIVE)  H  02/17/18  16:00    


 


Urine Blood  Negative  (NEGATIVE)   02/17/18  16:00    


 


Urine Nitrite  Negative  (NEGATIVE)   02/17/18  16:00    


 


Urine Bilirubin  Negative  (NEGATIVE)   02/17/18  16:00    


 


Urine Urobilinogen  2.0 mg/dL (0.2-1.0)   02/17/18  16:00    


 


Ur Leukocyte Esterase  Negative  (NEGATIVE)   02/17/18  16:00    


 


Urine WBC (Auto)  1 /hpf (3-5)   02/17/18  16:00    


 


Urine RBC (Auto)  1 /hpf (0-3)   02/17/18  16:00    


 


Ur Epithelial Cells  Rare /HPF (FEW)   02/17/18  16:00    


 


Hyaline Casts  2 /lpf  02/17/18  16:00    


 


Urine Mucus  Many   02/17/18  16:00    


 


RPR Titer  Nonreactive  (NONREACTIVE)   02/18/18  08:00    











Assessment: 





02/19/18 09:56


WITHDRAWAL SYMPTOM


Plan: 





CONTINUE DETOX

## 2018-02-20 RX ADMIN — METFORMIN HYDROCHLORIDE SCH MG: 500 TABLET ORAL at 17:17

## 2018-02-20 RX ADMIN — Medication SCH TAB: at 10:15

## 2018-02-20 RX ADMIN — LISINOPRIL SCH MG: 5 TABLET ORAL at 10:15

## 2018-02-20 RX ADMIN — ASPIRIN 81 MG SCH MG: 81 TABLET ORAL at 10:15

## 2018-02-20 RX ADMIN — BUDESONIDE AND FORMOTEROL FUMARATE DIHYDRATE SCH: 160; 4.5 AEROSOL RESPIRATORY (INHALATION) at 22:29

## 2018-02-20 RX ADMIN — ATORVASTATIN CALCIUM SCH MG: 40 TABLET, FILM COATED ORAL at 22:28

## 2018-02-20 RX ADMIN — METFORMIN HYDROCHLORIDE SCH MG: 500 TABLET ORAL at 06:31

## 2018-02-20 RX ADMIN — BUDESONIDE AND FORMOTEROL FUMARATE DIHYDRATE SCH PUFF: 160; 4.5 AEROSOL RESPIRATORY (INHALATION) at 10:15

## 2018-02-20 RX ADMIN — TIOTROPIUM BROMIDE SCH PUFF: 18 CAPSULE ORAL; RESPIRATORY (INHALATION) at 10:16

## 2018-02-20 RX ADMIN — Medication SCH MG: at 22:28

## 2018-02-20 NOTE — PN
[facility] Progress Note (SOAP)


Subjective: 





ALERT,INTERRUPTED SLEEP


Objective: 





02/20/18 09:32


 Vital Signs











Temperature  97.9 F   02/20/18 06:00


 


Pulse Rate  70   02/20/18 06:00


 


Respiratory Rate  16   02/20/18 06:00


 


Blood Pressure  122/68   02/20/18 06:00


 


O2 Sat by Pulse Oximetry (%)      











Assessment: 





02/20/18 09:32


WITHDRAWAL SYMPTOM


Plan: 





CONTINUE DETOX,DISCHARGE IN AM

## 2018-02-21 VITALS — DIASTOLIC BLOOD PRESSURE: 97 MMHG | TEMPERATURE: 98.6 F | HEART RATE: 85 BPM | SYSTOLIC BLOOD PRESSURE: 157 MMHG

## 2018-02-21 RX ADMIN — TIOTROPIUM BROMIDE SCH PUFF: 18 CAPSULE ORAL; RESPIRATORY (INHALATION) at 09:34

## 2018-02-21 RX ADMIN — BUDESONIDE AND FORMOTEROL FUMARATE DIHYDRATE SCH PUFF: 160; 4.5 AEROSOL RESPIRATORY (INHALATION) at 09:34

## 2018-02-21 RX ADMIN — LISINOPRIL SCH MG: 5 TABLET ORAL at 09:34

## 2018-02-21 RX ADMIN — Medication SCH TAB: at 09:34

## 2018-02-21 RX ADMIN — METFORMIN HYDROCHLORIDE SCH MG: 500 TABLET ORAL at 06:41

## 2018-02-21 RX ADMIN — ASPIRIN 81 MG SCH MG: 81 TABLET ORAL at 09:34

## 2018-02-21 NOTE — DS
[facility] Detox Discharge Summary


Admission Date: 


02/17/18





Discharge Date: 02/21/18





- History


Present History: Alcohol Dependence


Additional Comments: 





FOLLOW UP WITH AFTER CARE PROGRAM AS ARRANGEMENT


Pertinent Past History: 





ASTHMA


NICOTINE DEPENDENCE


LEGALLY BLIND LEFT EYE





- Physical Exam Results


Vital Signs: 


 Vital Signs











Temperature  97.7 F   02/21/18 06:00


 


Pulse Rate  76   02/21/18 06:00


 


Respiratory Rate  18   02/21/18 06:00


 


Blood Pressure  135/86   02/21/18 06:00


 


O2 Sat by Pulse Oximetry (%)      











Pertinent Admission Physical Exam Findings: 





WITHDRAWAL SYMPTOM





- Treatment


Hospital Course: Detox Protocol Followed, Detoxed Safely, Responded well, 

Discharged Condition Good


Patient has Accepted a Rehab Referral to: [facility]





- Medication


Discharge Medications: 


Ambulatory Orders





Albuterol Sulfate Inhaler - [Ventolin HFA Inhaler -] 2 inh PO Q4H PRN #1 

inhaler 12/13/17 


metFORMIN HCL [Glucophage -] 500 mg PO BID #60 tablet 12/13/17 


Aripiprazole [Abilify -] 10 mg PO DAILY 02/18/18 


Aspirin [ASA -] 81 mg PO DAILY 02/18/18 


Atorvastatin Ca [Lipitor] 40 mg PO HS 02/18/18 


Budesonide/Formeterol Fumarate [SYMBICORT 160/4.5mcg -] 1 inh PO BID 02/18/18 


Lisinopril [Prinivil] 5 mg PO DAILY 02/18/18 


Thiamine Mononitrate [Vitamin B-1] 100 mg PO DAILY 02/18/18 


Tiotropium Bromide [Spiriva] 2 inh IH DAILY 02/18/18 











- Diagnosis


(1) Alcohol dependence with uncomplicated withdrawal


Current Visit: Yes   Status: Acute   





(2) Legally blind in left eye, as defined in [place]


Current Visit: Yes   Status: Acute   





(3) Asthma


Current Visit: Yes   Status: Chronic   


Qualifiers: 


   Asthma severity: moderate   Asthma persistence: persistent 





(4) Nicotine dependence


Current Visit: Yes   Status: Chronic   


Qualifiers: 


   Nicotine product type: cigarettes   Substance use status: in withdrawal   

Qualified Code(s): F17.213 - Nicotine dependence, cigarettes, with withdrawal   





- AMA


Did Patient Leave Against Medical Advice: No

## 2018-03-14 ENCOUNTER — HOSPITAL ENCOUNTER (INPATIENT)
Dept: HOSPITAL 74 - YASAS | Age: 53
LOS: 4 days | Discharge: HOME | End: 2018-03-18
Attending: INTERNAL MEDICINE | Admitting: INTERNAL MEDICINE
Payer: COMMERCIAL

## 2018-03-14 VITALS — BODY MASS INDEX: 22.3 KG/M2

## 2018-03-14 DIAGNOSIS — F34.1: ICD-10-CM

## 2018-03-14 DIAGNOSIS — F10.230: Primary | ICD-10-CM

## 2018-03-14 DIAGNOSIS — F19.24: ICD-10-CM

## 2018-03-14 DIAGNOSIS — E11.9: ICD-10-CM

## 2018-03-14 DIAGNOSIS — H04.129: ICD-10-CM

## 2018-03-14 DIAGNOSIS — F17.210: ICD-10-CM

## 2018-03-14 DIAGNOSIS — F12.10: ICD-10-CM

## 2018-03-14 DIAGNOSIS — I10: ICD-10-CM

## 2018-03-14 LAB
APPEARANCE UR: CLEAR
BILIRUB UR STRIP.AUTO-MCNC: NEGATIVE MG/DL
COLOR UR: (no result)
EPITH CASTS URNS QL MICRO: (no result) /HPF
KETONES UR QL STRIP: (no result)
LEUKOCYTE ESTERASE UR QL STRIP.AUTO: NEGATIVE
MUCOUS THREADS URNS QL MICRO: (no result)
NITRITE UR QL STRIP: NEGATIVE
PH UR: 5 [PH] (ref 5–8)
PROT UR QL STRIP: (no result)
PROT UR QL STRIP: (no result)
RBC # UR STRIP: NEGATIVE /UL
SP GR UR: 1.04 (ref 1–1.03)
UROBILINOGEN UR STRIP-MCNC: (no result) MG/DL (ref 0.2–1)

## 2018-03-14 PROCEDURE — HZ2ZZZZ DETOXIFICATION SERVICES FOR SUBSTANCE ABUSE TREATMENT: ICD-10-PCS | Performed by: INTERNAL MEDICINE

## 2018-03-14 RX ADMIN — POLYVINYL ALCOHOL SCH: 14 SOLUTION/ DROPS OPHTHALMIC at 15:29

## 2018-03-14 RX ADMIN — NICOTINE SCH MG: 21 PATCH TRANSDERMAL at 15:10

## 2018-03-14 RX ADMIN — ATORVASTATIN CALCIUM SCH MG: 40 TABLET, FILM COATED ORAL at 22:48

## 2018-03-14 RX ADMIN — ACETAMINOPHEN PRN MG: 325 TABLET ORAL at 19:01

## 2018-03-14 RX ADMIN — POLYVINYL ALCOHOL SCH DROP: 14 SOLUTION/ DROPS OPHTHALMIC at 18:48

## 2018-03-14 RX ADMIN — INSULIN ASPART SCH: 100 INJECTION, SOLUTION INTRAVENOUS; SUBCUTANEOUS at 17:18

## 2018-03-14 RX ADMIN — Medication SCH MG: at 22:49

## 2018-03-14 RX ADMIN — INSULIN ASPART SCH: 100 INJECTION, SOLUTION INTRAVENOUS; SUBCUTANEOUS at 21:53

## 2018-03-14 RX ADMIN — METFORMIN HYDROCHLORIDE SCH MG: 500 TABLET ORAL at 18:48

## 2018-03-14 RX ADMIN — ALBUTEROL SULFATE PRN PUFF: 90 AEROSOL, METERED RESPIRATORY (INHALATION) at 19:00

## 2018-03-14 RX ADMIN — BUDESONIDE AND FORMOTEROL FUMARATE DIHYDRATE SCH PUFF: 160; 4.5 AEROSOL RESPIRATORY (INHALATION) at 22:48

## 2018-03-14 RX ADMIN — POLYVINYL ALCOHOL SCH DROP: 14 SOLUTION/ DROPS OPHTHALMIC at 22:48

## 2018-03-14 NOTE — HP
CIWA Score





- CIWA Score


Nausea/Vomitin-Mild Nausea/No Vomiting


Muscle Tremors: 4-Moderate,w/Arms Extend


Anxiety: 4-Mod. Anxious/Guarded


Agitation: 4-Moderately Restless


Paroxysmal Sweats: 1-Minimal Palms Moist


Orientation: 0-Oriented


Tacttile Disturbances: 1-Very Mild Itch/Numbness


Auditory Disturbances: 0-None


Visual Disturbances: 0-None


Headache: 2-Mild


CIWA-Ar Total Score: 17





Admission ROS [facility]





- HPI


Chief Complaint: 





withdrawal sx


Allergies/Adverse Reactions: 


 Allergies











Allergy/AdvReac Type Severity Reaction Status Date / Time


 


No Known Allergies Allergy   Verified 18 12:42











History of Present Illness: 





52 years old male medical history of asthma diabetes ii and alcohol nicotine 

dependence has depression and longest sobriety 17 years relapse 2012 is 

admitted to detox


Exam Limitations: No Limitations





- Ebola screening


Have you traveled outside of the country in the last 21 days: No


Have you had contact with anyone from an Ebola affected area: No


Have you been sick,other than usual withdrawal symptoms: No


Do you have a fever: No





- Review of Systems


Constitutional: Loss of Appetite, Changes in sleep, Unintentional Wgt. Loss, 

Unexplained wgt Loss


EENT: reports: No Symptoms Reported, Cataracts (left eye)


Respiratory: reports: SOB with Exertion, Productive cough (greenish)


Cardiac: reports: No Symptoms Reported


GI: reports: Nausea, Poor Appetite, Poor Fluid Intake, Abdominal cramping


: reports: No Symptoms Reported


Musculoskeletal: reports: No Symptoms Reported


Integumentary: reports: Change in Color (bridge of the nose + cheecks), Other (

multiple old scars with recent newly healed scars on knees hands and left hip)


Neuro: reports: Tremors


Endocrine: reports: No Symptoms Reported


Hematology: reports: No Symptoms Reported


Psychiatric: reports: Judgement Intact, Anxious, Depressed


Other Systems: Reviewed and Negative





Patient History





- Patient Medical History


Hx Anemia: No


Hx Asthma: Yes


Hx Chronic Obstructive Pulmonary Disease (COPD): No


Hx Cancer: No


Hx Cardiac Disorders: No


Hx Congestive Heart Failure: No


Hx Hypertension: Yes (no treatment)


Hx Hypercholesterolemia: Yes (poor adherence to meds)


Hx Pacemaker: No


HX Cerebrovascular Accident: No


Hx Seizures: No


Hx Dementia: No


Hx Diabetes: Yes (NIDDM)


Hx Gastrointestinal Disorders: No


Hx Liver Disease: No


Hx Genitourinary Disorders: No


Hx Sexually Transmitted Disorders: No


Hx Renal Disease (ESRD): No


Hx Thyroid Disease: No


Hx Human Immunodeficiency Virus (HIV): No


Hx Hepatitis C: No


Hx Depression: Yes


Hx Suicide Attempt: No


Hx Bipolar Disorder: No


Hx Schizophrenia: No





- Patient Surgical History


Past Surgical History: Yes


Hx Neurologic Surgery: No


Hx Cataract Extraction: Yes (left eye)


Hx Cardiac Surgery: No


Hx Lung Surgery: No


Hx Breast Surgery: No


Hx Breast Biopsy: No


Hx Abdominal Surgery: Yes (umbilical hernia as a child)


Hx Appendectomy: No


Hx Cholecystectomy: No


Hx Genitourinary Surgery: No


Hx Orthopedic Surgery: No


Other Surgical History: detached retina, left eye years ago


Anesthesia Reaction: No





- PPD History


Previous Implant?: Yes


Documented Results: Negative w/proof


Implanted On Prior [facility] Admission?: Yes


Date: 17


Results: 0 mm


PPD to be Administered?: No





- Smoking Cessation


Smoking history: Current every day smoker


Have you smoked in the past 12 months: Yes


Aproximately how many cigarettes per day: 20


Cigars Per Day: 0


Hx Chewing Tobacco Use: No


Initiated information on smoking cessation: Yes


'Breaking Loose' booklet given: 18





- Substance & Tx. History


Hx Alcohol Use: Yes


Hx Substance Use: Yes


Substance Use Type: Alcohol, Marijuana





- Substances Abused


  ** Alcohol-wine/beer


Route: Oral


Frequency: Daily


Amount used: 3 pts./2-6 pks.


Age of first use: 13


Date of Last Use: 18





Family Disease History





- Family Disease History


Family Disease History: Diabetes: Sister ( -  MVA), Other: Father (

etoh, , asthma), Mother (living, healthy), Brother, Sister





Admission Physical Exam [facility]





- Vital Signs


Vital Signs: 


 Vital Signs - 24 hr











  18





  12:21


 


Temperature 98.5 F


 


Pulse Rate 95 H


 


Respiratory 20





Rate 


 


Blood Pressure 121/67














- Physical


General Appearance: Yes: Appropriately Dressed, Mild Distress, Thin, Tremorous, 

Irritable, Sweating, Anxious


HEENTM: Yes: Hearing grossly Normal, Normal ENT Inspection, Normocephalic, 

Normal Voice, Other (redness of the left eye)


Respiratory: Yes: Chest Non-Tender, No Respiratory Distress, No Accessory 

Muscle Use, Wheezing, Expiration


Neck: Yes: Supple, Trachea in good position


Breast: Yes: Breasts Symetrical


Cardiology: Yes: Regular Rhythm, S1, S2, Tachycardia


Abdominal: Yes: Normal Bowel Sounds, Non Tender, Flat


Genitourinary: Yes: Within Normal Limits


Back: Yes: Normal Inspection


Musculoskeletal: Yes: full range of Motion, Gait Steady, Back pain (multiple 

fall related to alcohol drinking)


Extremities: Yes: Non-Tender, Tremors, Other (left hip mild redness from fall 

"the other day")


Neurological: Yes: Alert, Motor Strength 5/5, Normal Response, Depressed Affect


Integumentary: Yes: Warm


Lymphatic: Yes: Within Normal Limits





- Diagnostic


(1) Alcohol dependence with uncomplicated withdrawal


Current Visit: Yes   Status: Acute   





(2) Asthma


Current Visit: Yes   Status: Chronic   


Qualifiers: 


   Asthma severity: moderate   Asthma persistence: persistent   Asthma 

complication type: with status asthmaticus   Qualified Code(s): J45.42 - 

Moderate persistent asthma with status asthmaticus   





(3) DM (diabetes mellitus), type 2


Current Visit: Yes   Status: Chronic   


Qualifiers: 


   Diabetes mellitus long term insulin use: without long term use   Diabetes 

mellitus complication status: without complication   Qualified Code(s): E11.9 - 

Type 2 diabetes mellitus without complications   





(4) Depression


Current Visit: Yes   Status: Suspected   


Qualifiers: 


   Depression Type: dysthymia   Qualified Code(s): F34.1 - Dysthymic disorder   





(5) Nicotine dependence


Current Visit: Yes   Status: Acute   


Qualifiers: 


   Nicotine product type: cigarettes   Substance use status: in withdrawal   

Qualified Code(s): F17.213 - Nicotine dependence, cigarettes, with withdrawal   





(6) Dry eye


Current Visit: Yes   Status: Chronic   





Cleared for Admission [facility]





- Detox or Rehab


[facility] Level of Care: Medically Managed


Detox Regimen/Protocol: Librium





[facility] Breath Alcohol Content


Breath Alcohol Content: 0





Urine Drug Screen





- Results


Drug Screen Negative: No


Urine Drug Screen Results: THC-Marijuana, BAR-Barbiturates, BZO-Benzodiazepines

## 2018-03-14 NOTE — CONSULT
[facility] Psychiatric Consult





- Data


Date of interview: 03/14/18


Admission source: [facility]


Identifying data: Readmission to [facility] for this 53 y/o AA male seeking 

detox treatment on [address] for alcohol dependence (toxicology is positive for 

cannabis).Patient is single without children,domiciled,unemployed and supported 

on SSI benefits as per self-report.


Substance Abuse History: Discussed in this interview.Mr Carrillo endorses a 30+ 

year history of alcohol dependence (consumes 1-3 pints of liquor daily + 2 X 6 

packs of beer).Used alcohol and marihuana prior to this [facility] visit. Details as 

follows : Smoking history: Current every day smoker.  Have you smoked in the 

past 12 months: Yes.  Aproximately how many cigarettes per day: 20.  Cigars Per 

Day: 0.  Hx Chewing Tobacco Use: No.  Initiated information on smoking cessation

: Yes.  'Breaking Loose' booklet given: 03/14/18.  - Substance & Tx. History.  

Hx Alcohol Use: Yes.  Hx Substance Use: Yes.  Substance Use Type: Alcohol, 

Marijuana.  - Substances Abused.  ** Alcohol-wine/beer.  Route: Oral.  Frequency

: Daily.  Amount used: 3 pts./2-6 pks.  Age of first use: 13.  Date of Last Use

: 03/13/18


Medical History: Multiple medical co-morbidities : bronchial asthma, 

dyslipidemia, diabetes mellitus,history of herniorraphy and eye surgery for a 

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


Psychiatric History: Vague and approximate historian.Patient admits to one 

psychiatric hospitalization at [facility] five years ago.Diagnosis and 

circumstances of admission : unclear.Mr Carrillo does not recall.No 

recollection of names of medications, if any, prescribed at the time.Patient 

indicates that he has been functioning without psychotropic medications 

throughout his life.No contact with psychiatric OPD care providers.No reported 

history of suicide attempts.


Physical/Sexual Abuse/Trauma History: Patient denies.


Additional Comment: Urine Drug Screen Results: THC-Marijuana, BAR-Barbiturates, 

BZO-Benzodiazepines.Noted.





Mental Status Exam





- Mental Status Exam


Alert and Oriented to: Place, Person


Cognitive Function: Grossly Intact


Patient Appearance: Disheveled (thin habitus,short stature)


Mood: Nervous, Withdrawn, Anxious


Affect: Mood Congruent, Constricted


Patient Behavior: Fatigued, Cooperative


Speech Pattern: Clear (slow but spontaneous speech)


Voice Loudness: Moderately Soft/Quiet


Thought Process: Goal Oriented


Thought Disorder: Not Present


Hallucinations: Denies


Suicidal Ideation: Denies


Homicidal Ideation: Denies


Insight/Judgement: Poor


Sleep: Well


Appetite: Fair


Gait/Station: Other (slow but steady gait)





Psychiatric Findings





- Problem List (Axis 1, 2,3)


(1) Alcohol dependence with uncomplicated withdrawal


Current Visit: Yes   Status: Acute   





(2) Marijuana abuse


Current Visit: Yes   Status: Acute   





(3) Nicotine dependence


Current Visit: Yes   Status: Acute   


Qualifiers: 


   Nicotine product type: cigarettes   Substance use status: in withdrawal   

Qualified Code(s): F17.213 - Nicotine dependence, cigarettes, with withdrawal   





(4) Substance induced mood disorder


Current Visit: Yes   Status: Acute   





- Initial Treatment Plan


Initial Treatment Plan: Psychoeducation.Records from previous admissions to 

[facility] : revisited.Orientation to unit.Detoxification initiated.Observation.

## 2018-03-15 LAB
ALBUMIN SERPL-MCNC: 3.7 G/DL (ref 3.4–5)
ALP SERPL-CCNC: 59 U/L (ref 45–117)
ALT SERPL-CCNC: 14 U/L (ref 12–78)
ANION GAP SERPL CALC-SCNC: 9 MMOL/L (ref 8–16)
AST SERPL-CCNC: 11 U/L (ref 15–37)
BILIRUB SERPL-MCNC: 0.9 MG/DL (ref 0.2–1)
BUN SERPL-MCNC: 19 MG/DL (ref 7–18)
CALCIUM SERPL-MCNC: 8.6 MG/DL (ref 8.5–10.1)
CHLORIDE SERPL-SCNC: 103 MMOL/L (ref 98–107)
CO2 SERPL-SCNC: 29 MMOL/L (ref 21–32)
CREAT SERPL-MCNC: 0.7 MG/DL (ref 0.7–1.3)
DEPRECATED RDW RBC AUTO: 16.5 % (ref 11.9–15.9)
GLUCOSE SERPL-MCNC: 202 MG/DL (ref 74–106)
HCT VFR BLD CALC: 34.4 % (ref 35.4–49)
HGB BLD-MCNC: 11.1 GM/DL (ref 11.7–16.9)
MCH RBC QN AUTO: 29.2 PG (ref 25.7–33.7)
MCHC RBC AUTO-ENTMCNC: 32.3 G/DL (ref 32–35.9)
MCV RBC: 90.5 FL (ref 80–96)
PLATELET # BLD AUTO: 246 K/MM3 (ref 134–434)
PMV BLD: 11.3 FL (ref 7.5–11.1)
POTASSIUM SERPLBLD-SCNC: 3.6 MMOL/L (ref 3.5–5.1)
PROT SERPL-MCNC: 6.5 G/DL (ref 6.4–8.2)
RBC # BLD AUTO: 3.81 M/MM3 (ref 4–5.6)
SODIUM SERPL-SCNC: 141 MMOL/L (ref 136–145)
WBC # BLD AUTO: 12.3 K/MM3 (ref 4–10)

## 2018-03-15 RX ADMIN — BUDESONIDE AND FORMOTEROL FUMARATE DIHYDRATE SCH PUFF: 160; 4.5 AEROSOL RESPIRATORY (INHALATION) at 22:30

## 2018-03-15 RX ADMIN — METFORMIN HYDROCHLORIDE SCH MG: 500 TABLET ORAL at 06:36

## 2018-03-15 RX ADMIN — NICOTINE SCH: 21 PATCH TRANSDERMAL at 10:50

## 2018-03-15 RX ADMIN — NAPROXEN SCH MG: 250 TABLET ORAL at 22:31

## 2018-03-15 RX ADMIN — Medication SCH TAB: at 10:50

## 2018-03-15 RX ADMIN — BUDESONIDE AND FORMOTEROL FUMARATE DIHYDRATE SCH PUFF: 160; 4.5 AEROSOL RESPIRATORY (INHALATION) at 10:50

## 2018-03-15 RX ADMIN — ACETAMINOPHEN PRN MG: 325 TABLET ORAL at 09:52

## 2018-03-15 RX ADMIN — INSULIN ASPART SCH: 100 INJECTION, SOLUTION INTRAVENOUS; SUBCUTANEOUS at 22:08

## 2018-03-15 RX ADMIN — POLYVINYL ALCOHOL SCH DROP: 14 SOLUTION/ DROPS OPHTHALMIC at 22:30

## 2018-03-15 RX ADMIN — ACETAMINOPHEN PRN MG: 325 TABLET ORAL at 17:27

## 2018-03-15 RX ADMIN — ASPIRIN 81 MG SCH MG: 81 TABLET ORAL at 10:50

## 2018-03-15 RX ADMIN — POLYVINYL ALCOHOL SCH DROP: 14 SOLUTION/ DROPS OPHTHALMIC at 10:50

## 2018-03-15 RX ADMIN — INSULIN ASPART SCH: 100 INJECTION, SOLUTION INTRAVENOUS; SUBCUTANEOUS at 06:37

## 2018-03-15 RX ADMIN — ACETAMINOPHEN PRN MG: 325 TABLET ORAL at 06:01

## 2018-03-15 RX ADMIN — LISINOPRIL SCH MG: 5 TABLET ORAL at 10:50

## 2018-03-15 RX ADMIN — POLYVINYL ALCOHOL SCH DROP: 14 SOLUTION/ DROPS OPHTHALMIC at 13:29

## 2018-03-15 RX ADMIN — POLYVINYL ALCOHOL SCH: 14 SOLUTION/ DROPS OPHTHALMIC at 17:25

## 2018-03-15 RX ADMIN — ATORVASTATIN CALCIUM SCH MG: 40 TABLET, FILM COATED ORAL at 22:31

## 2018-03-15 RX ADMIN — INSULIN ASPART SCH: 100 INJECTION, SOLUTION INTRAVENOUS; SUBCUTANEOUS at 17:26

## 2018-03-15 RX ADMIN — INSULIN ASPART SCH: 100 INJECTION, SOLUTION INTRAVENOUS; SUBCUTANEOUS at 11:10

## 2018-03-15 RX ADMIN — Medication SCH MG: at 22:32

## 2018-03-15 RX ADMIN — METFORMIN HYDROCHLORIDE SCH MG: 500 TABLET ORAL at 17:26

## 2018-03-15 NOTE — PN
[facility] CIWA





- CIWA Score


Nausea/Vomitin-Mild Nausea/No Vomiting


Muscle Tremors: 3


Anxiety: 4-Mod. Anxious/Guarded


Agitation: 1-Slight > Activity


Paroxysmal Sweats: 3


Orientation: 2-Disoriented Date<2 days


Tacttile Disturbances: 1-Very Mild Itch/Numbness


Auditory Disturbances: 0-None


Visual Disturbances: 2-Mild Sensitivity


Headache: 0-None Present


CIWA-Ar Total Score: 17





[facility] Progress Note (SOAP)


Subjective: 





Sweating, Anxious, Fatigue, Nausea.


Patient reports discomfort at lateral aspect of right side of chest X approx. 2 

days. Patient notes that he fell on right side just before pain started.


Objective: 


PATIENT A & O X 2 (UNCERTAIN ABOUT CURRENT DAY/  DATE). PATIENT OBSERVED 

AMBULATING ON UNIT. NO ACUTE DISTRESS.


TENDERNESS NOTED UPON PALPATION OF LATERAL ASPECT OF RIGHT PECTORALIS MUSCLE. 

LUNG SOUNDS AUSCULTATED CLEAR AND EQUAL BILATERALLY. ADMISSION ECG RESULTS 

NOTED. PATIENT DENIES PAIN AT STERNAL AND LEFT ASPECTS OF CHEST. PATIENT DENIES 

DIZZINESS AND SOB.





03/15/18 17:19


 Vital Signs











Temperature  97.1 F L  03/15/18 14:49


 


Pulse Rate  91 H  03/15/18 14:49


 


Respiratory Rate  18   03/15/18 14:49


 


Blood Pressure  112/61   03/15/18 14:49


 


O2 Sat by Pulse Oximetry (%)      








 Laboratory Tests











  18





  13:12 15:00 16:50


 


WBC   


 


RBC   


 


Hgb   


 


Hct   


 


MCV   


 


MCH   


 


MCHC   


 


RDW   


 


Plt Count   


 


MPV   


 


Sodium   


 


Potassium   


 


Chloride   


 


Carbon Dioxide   


 


Anion Gap   


 


BUN   


 


Creatinine   


 


Creat Clearance w eGFR   


 


POC Glucometer  218   117


 


Random Glucose   


 


Calcium   


 


Total Bilirubin   


 


AST   


 


ALT   


 


Alkaline Phosphatase   


 


Total Protein   


 


Albumin   


 


Urine Color   Tesha 


 


Urine Appearance   Clear 


 


Urine pH   5.0 


 


Ur Specific Gravity   1.037 H 


 


Urine Protein   1+ H 


 


Urine Glucose (UA)   3+ H 


 


Urine Ketones   Trace H 


 


Urine Blood   Negative 


 


Urine Nitrite   Negative 


 


Urine Bilirubin   Negative 


 


Urine Urobilinogen   4.0 e.u/dl 


 


Ur Leukocyte Esterase   Negative 


 


Urine WBC (Auto)   1 


 


Urine RBC (Auto)   1 


 


Ur Epithelial Cells   Rare 


 


Urine Mucus   Many 


 


RPR Titer   














  03/14/18 03/15/18 03/15/18





  21:19 06:00 06:00


 


WBC   12.3 H 


 


RBC   3.81 L 


 


Hgb   11.1 L 


 


Hct   34.4 L 


 


MCV   90.5 


 


MCH   29.2 


 


MCHC   32.3 


 


RDW   16.5 H 


 


Plt Count   246 


 


MPV   11.3 H D 


 


Sodium    141


 


Potassium    3.6


 


Chloride    103


 


Carbon Dioxide    29


 


Anion Gap    9


 


BUN    19 H


 


Creatinine    0.7


 


Creat Clearance w eGFR    > 60


 


POC Glucometer  111  


 


Random Glucose    202 H D


 


Calcium    8.6


 


Total Bilirubin    0.9  D


 


AST    11 L D


 


ALT    14  D


 


Alkaline Phosphatase    59


 


Total Protein    6.5


 


Albumin    3.7  D


 


Urine Color   


 


Urine Appearance   


 


Urine pH   


 


Ur Specific Gravity   


 


Urine Protein   


 


Urine Glucose (UA)   


 


Urine Ketones   


 


Urine Blood   


 


Urine Nitrite   


 


Urine Bilirubin   


 


Urine Urobilinogen   


 


Ur Leukocyte Esterase   


 


Urine WBC (Auto)   


 


Urine RBC (Auto)   


 


Ur Epithelial Cells   


 


Urine Mucus   


 


RPR Titer   














  03/15/18 03/15/18 03/15/18





  06:00 06:00 16:27


 


WBC   


 


RBC   


 


Hgb   


 


Hct   


 


MCV   


 


MCH   


 


MCHC   


 


RDW   


 


Plt Count   


 


MPV   


 


Sodium   


 


Potassium   


 


Chloride   


 


Carbon Dioxide   


 


Anion Gap   


 


BUN   


 


Creatinine   


 


Creat Clearance w eGFR   


 


POC Glucometer   118  114


 


Random Glucose   


 


Calcium   


 


Total Bilirubin   


 


AST   


 


ALT   


 


Alkaline Phosphatase   


 


Total Protein   


 


Albumin   


 


Urine Color   


 


Urine Appearance   


 


Urine pH   


 


Ur Specific Gravity   


 


Urine Protein   


 


Urine Glucose (UA)   


 


Urine Ketones   


 


Urine Blood   


 


Urine Nitrite   


 


Urine Bilirubin   


 


Urine Urobilinogen   


 


Ur Leukocyte Esterase   


 


Urine WBC (Auto)   


 


Urine RBC (Auto)   


 


Ur Epithelial Cells   


 


Urine Mucus   


 


RPR Titer  Nonreactive  








LABS NOTED.


03/15/18 17:21





Assessment: 





03/15/18 17:23


WITHDRAWAL SYMPTOMS.


Plan: 





CONTINUE DETOX.


NAPROXEN BID FOR DISCOMFORT IN RIGHT PECTORALIS MUSCLE.


PATIENT ADVISED TO NOTIFIY NURSING / MEDICAL STAFF IMMEDIATELY SHOULD PAIN 

BECOME MORE SEVERE OR SHOULD LOCATION OF PAIN CHANGE AT ANY TIME. PATIENT 

VERBALIZED UNDERSTANDING OF INSTRUCTIONS.

## 2018-03-15 NOTE — EKG
Test Reason : 

Blood Pressure : ***/*** mmHG

Vent. Rate : 097 BPM     Atrial Rate : 097 BPM

   P-R Int : 106 ms          QRS Dur : 084 ms

    QT Int : 350 ms       P-R-T Axes : 065 074 058 degrees

   QTc Int : 444 ms

 

SINUS RHYTHM WITH SHORT OH

NONSPECIFIC T WAVE ABNORMALITY

ABNORMAL ECG

WHEN COMPARED WITH ECG OF 17-FEB-2018 15:54,

NONSPECIFIC T WAVE ABNORMALITY, WORSE IN INFERIOR LEADS

Confirmed by RAF MICHAUD, JEFFERSON ([id number]) on 3/15/2018 4:44:56 PM

 

Referred By:             Confirmed By:JEFFERSON CARBONE MD

## 2018-03-16 RX ADMIN — LISINOPRIL SCH MG: 5 TABLET ORAL at 10:48

## 2018-03-16 RX ADMIN — BUDESONIDE AND FORMOTEROL FUMARATE DIHYDRATE SCH PUFF: 160; 4.5 AEROSOL RESPIRATORY (INHALATION) at 10:47

## 2018-03-16 RX ADMIN — POLYVINYL ALCOHOL SCH DROP: 14 SOLUTION/ DROPS OPHTHALMIC at 10:48

## 2018-03-16 RX ADMIN — INSULIN ASPART SCH: 100 INJECTION, SOLUTION INTRAVENOUS; SUBCUTANEOUS at 11:15

## 2018-03-16 RX ADMIN — METFORMIN HYDROCHLORIDE SCH MG: 500 TABLET ORAL at 17:36

## 2018-03-16 RX ADMIN — INSULIN ASPART SCH: 100 INJECTION, SOLUTION INTRAVENOUS; SUBCUTANEOUS at 06:12

## 2018-03-16 RX ADMIN — NICOTINE SCH: 21 PATCH TRANSDERMAL at 10:48

## 2018-03-16 RX ADMIN — POLYVINYL ALCOHOL SCH: 14 SOLUTION/ DROPS OPHTHALMIC at 22:33

## 2018-03-16 RX ADMIN — POLYVINYL ALCOHOL SCH DROP: 14 SOLUTION/ DROPS OPHTHALMIC at 13:38

## 2018-03-16 RX ADMIN — POLYVINYL ALCOHOL SCH: 14 SOLUTION/ DROPS OPHTHALMIC at 17:37

## 2018-03-16 RX ADMIN — NAPROXEN SCH MG: 250 TABLET ORAL at 22:31

## 2018-03-16 RX ADMIN — METFORMIN HYDROCHLORIDE SCH MG: 500 TABLET ORAL at 06:11

## 2018-03-16 RX ADMIN — ASPIRIN 81 MG SCH MG: 81 TABLET ORAL at 10:48

## 2018-03-16 RX ADMIN — INSULIN ASPART SCH: 100 INJECTION, SOLUTION INTRAVENOUS; SUBCUTANEOUS at 17:37

## 2018-03-16 RX ADMIN — INSULIN ASPART SCH: 100 INJECTION, SOLUTION INTRAVENOUS; SUBCUTANEOUS at 22:35

## 2018-03-16 RX ADMIN — Medication SCH TAB: at 10:48

## 2018-03-16 RX ADMIN — NAPROXEN SCH MG: 250 TABLET ORAL at 10:48

## 2018-03-16 RX ADMIN — ATORVASTATIN CALCIUM SCH MG: 40 TABLET, FILM COATED ORAL at 22:32

## 2018-03-16 RX ADMIN — Medication SCH MG: at 22:34

## 2018-03-16 RX ADMIN — BUDESONIDE AND FORMOTEROL FUMARATE DIHYDRATE SCH PUFF: 160; 4.5 AEROSOL RESPIRATORY (INHALATION) at 22:34

## 2018-03-16 NOTE — PN
S CIWA





- CIWA Score


Nausea/Vomitin-No Nausea/No Vomiting


Muscle Tremors: 2


Anxiety: 2


Agitation: 0-Normal Activity


Paroxysmal Sweats: 3


Orientation: 2-Disoriented Date<2 days


Tacttile Disturbances: 0-None


Auditory Disturbances: 0-None


Visual Disturbances: 3-Moderate Sensitivity


Headache: 4-Moderately Severe


CIWA-Ar Total Score: 16





BHS Progress Note (SOAP)


Subjective: 





Body Aches, Anxious, Fatigue, H/A.


Objective: 


PATIENT A & O X 2 (UNCERTAIN ABOUT CURRENT DAY/  DATE). NO ACUTE DISTRESS.





18 15:34


 Vital Signs











Temperature  97.7 F   18 14:23


 


Pulse Rate  105 H  18 14:23


 


Respiratory Rate  18   18 14:23


 


Blood Pressure  115/71   18 14:23


 


O2 Sat by Pulse Oximetry (%)      








 Laboratory Tests











  18





  13:12 15:00 16:50


 


WBC   


 


RBC   


 


Hgb   


 


Hct   


 


MCV   


 


MCH   


 


MCHC   


 


RDW   


 


Plt Count   


 


MPV   


 


Sodium   


 


Potassium   


 


Chloride   


 


Carbon Dioxide   


 


Anion Gap   


 


BUN   


 


Creatinine   


 


Creat Clearance w eGFR   


 


POC Glucometer  218   117


 


Random Glucose   


 


Calcium   


 


Total Bilirubin   


 


AST   


 


ALT   


 


Alkaline Phosphatase   


 


Total Protein   


 


Albumin   


 


Urine Color   Tesha 


 


Urine Appearance   Clear 


 


Urine pH   5.0 


 


Ur Specific Gravity   1.037 H 


 


Urine Protein   1+ H 


 


Urine Glucose (UA)   3+ H 


 


Urine Ketones   Trace H 


 


Urine Blood   Negative 


 


Urine Nitrite   Negative 


 


Urine Bilirubin   Negative 


 


Urine Urobilinogen   4.0 e.u/dl 


 


Ur Leukocyte Esterase   Negative 


 


Urine WBC (Auto)   1 


 


Urine RBC (Auto)   1 


 


Ur Epithelial Cells   Rare 


 


Urine Mucus   Many 


 


RPR Titer   














  03/14/18 03/15/18 03/15/18





  21:19 06:00 06:00


 


WBC   12.3 H 


 


RBC   3.81 L 


 


Hgb   11.1 L 


 


Hct   34.4 L 


 


MCV   90.5 


 


MCH   29.2 


 


MCHC   32.3 


 


RDW   16.5 H 


 


Plt Count   246 


 


MPV   11.3 H D 


 


Sodium    141


 


Potassium    3.6


 


Chloride    103


 


Carbon Dioxide    29


 


Anion Gap    9


 


BUN    19 H


 


Creatinine    0.7


 


Creat Clearance w eGFR    > 60


 


POC Glucometer  111  


 


Random Glucose    202 H D


 


Calcium    8.6


 


Total Bilirubin    0.9  D


 


AST    11 L D


 


ALT    14  D


 


Alkaline Phosphatase    59


 


Total Protein    6.5


 


Albumin    3.7  D


 


Urine Color   


 


Urine Appearance   


 


Urine pH   


 


Ur Specific Gravity   


 


Urine Protein   


 


Urine Glucose (UA)   


 


Urine Ketones   


 


Urine Blood   


 


Urine Nitrite   


 


Urine Bilirubin   


 


Urine Urobilinogen   


 


Ur Leukocyte Esterase   


 


Urine WBC (Auto)   


 


Urine RBC (Auto)   


 


Ur Epithelial Cells   


 


Urine Mucus   


 


RPR Titer   














  03/15/18 03/15/18 03/15/18





  06:00 06:00 16:27


 


WBC   


 


RBC   


 


Hgb   


 


Hct   


 


MCV   


 


MCH   


 


MCHC   


 


RDW   


 


Plt Count   


 


MPV   


 


Sodium   


 


Potassium   


 


Chloride   


 


Carbon Dioxide   


 


Anion Gap   


 


BUN   


 


Creatinine   


 


Creat Clearance w eGFR   


 


POC Glucometer   118  114


 


Random Glucose   


 


Calcium   


 


Total Bilirubin   


 


AST   


 


ALT   


 


Alkaline Phosphatase   


 


Total Protein   


 


Albumin   


 


Urine Color   


 


Urine Appearance   


 


Urine pH   


 


Ur Specific Gravity   


 


Urine Protein   


 


Urine Glucose (UA)   


 


Urine Ketones   


 


Urine Blood   


 


Urine Nitrite   


 


Urine Bilirubin   


 


Urine Urobilinogen   


 


Ur Leukocyte Esterase   


 


Urine WBC (Auto)   


 


Urine RBC (Auto)   


 


Ur Epithelial Cells   


 


Urine Mucus   


 


RPR Titer  Nonreactive  














  03/15/18 03/16/18





  21:05 06:10


 


WBC  


 


RBC  


 


Hgb  


 


Hct  


 


MCV  


 


MCH  


 


MCHC  


 


RDW  


 


Plt Count  


 


MPV  


 


Sodium  


 


Potassium  


 


Chloride  


 


Carbon Dioxide  


 


Anion Gap  


 


BUN  


 


Creatinine  


 


Creat Clearance w eGFR  


 


POC Glucometer  182  112


 


Random Glucose  


 


Calcium  


 


Total Bilirubin  


 


AST  


 


ALT  


 


Alkaline Phosphatase  


 


Total Protein  


 


Albumin  


 


Urine Color  


 


Urine Appearance  


 


Urine pH  


 


Ur Specific Gravity  


 


Urine Protein  


 


Urine Glucose (UA)  


 


Urine Ketones  


 


Urine Blood  


 


Urine Nitrite  


 


Urine Bilirubin  


 


Urine Urobilinogen  


 


Ur Leukocyte Esterase  


 


Urine WBC (Auto)  


 


Urine RBC (Auto)  


 


Ur Epithelial Cells  


 


Urine Mucus  


 


RPR Titer  








LABS NOTED.


Assessment: 





18 15:35


WITHDRAWAL SYMPTOMS.


Plan: 





CONTINUE DETOX.


X-RAY OF RIGHT RIBCAGE ORDERED (SEE MEDICAL PROVIDER NOTE FROM 2018). 

RESULTS PENDING.

## 2018-03-17 RX ADMIN — INSULIN ASPART SCH: 100 INJECTION, SOLUTION INTRAVENOUS; SUBCUTANEOUS at 23:12

## 2018-03-17 RX ADMIN — INSULIN ASPART SCH: 100 INJECTION, SOLUTION INTRAVENOUS; SUBCUTANEOUS at 11:07

## 2018-03-17 RX ADMIN — ALBUTEROL SULFATE PRN PUFF: 90 AEROSOL, METERED RESPIRATORY (INHALATION) at 23:13

## 2018-03-17 RX ADMIN — INSULIN ASPART SCH: 100 INJECTION, SOLUTION INTRAVENOUS; SUBCUTANEOUS at 07:59

## 2018-03-17 RX ADMIN — BUDESONIDE AND FORMOTEROL FUMARATE DIHYDRATE SCH PUFF: 160; 4.5 AEROSOL RESPIRATORY (INHALATION) at 23:13

## 2018-03-17 RX ADMIN — NICOTINE SCH: 21 PATCH TRANSDERMAL at 10:23

## 2018-03-17 RX ADMIN — POLYVINYL ALCOHOL SCH: 14 SOLUTION/ DROPS OPHTHALMIC at 23:13

## 2018-03-17 RX ADMIN — POLYVINYL ALCOHOL SCH DROP: 14 SOLUTION/ DROPS OPHTHALMIC at 11:07

## 2018-03-17 RX ADMIN — INSULIN ASPART SCH: 100 INJECTION, SOLUTION INTRAVENOUS; SUBCUTANEOUS at 16:50

## 2018-03-17 RX ADMIN — ATORVASTATIN CALCIUM SCH: 40 TABLET, FILM COATED ORAL at 23:13

## 2018-03-17 RX ADMIN — ASPIRIN 81 MG SCH MG: 81 TABLET ORAL at 10:22

## 2018-03-17 RX ADMIN — Medication SCH: at 23:13

## 2018-03-17 RX ADMIN — NAPROXEN SCH MG: 250 TABLET ORAL at 10:22

## 2018-03-17 RX ADMIN — POLYVINYL ALCOHOL SCH DROP: 14 SOLUTION/ DROPS OPHTHALMIC at 18:13

## 2018-03-17 RX ADMIN — POLYVINYL ALCOHOL SCH: 14 SOLUTION/ DROPS OPHTHALMIC at 15:07

## 2018-03-17 RX ADMIN — BUDESONIDE AND FORMOTEROL FUMARATE DIHYDRATE SCH PUFF: 160; 4.5 AEROSOL RESPIRATORY (INHALATION) at 10:22

## 2018-03-17 RX ADMIN — NAPROXEN SCH: 250 TABLET ORAL at 23:12

## 2018-03-17 RX ADMIN — METFORMIN HYDROCHLORIDE SCH MG: 500 TABLET ORAL at 18:13

## 2018-03-17 RX ADMIN — METFORMIN HYDROCHLORIDE SCH MG: 500 TABLET ORAL at 06:57

## 2018-03-17 RX ADMIN — Medication SCH TAB: at 10:22

## 2018-03-17 RX ADMIN — LISINOPRIL SCH MG: 5 TABLET ORAL at 10:22

## 2018-03-17 NOTE — PN
[facility] Progress Note (SOAP)


Subjective: 





Sweating, Fatigue, Anxious.


Objective: 


PATIENT A & O X 3, OBSERVED AMBULATING ON UNIT. NO ACUTE DISTRESS.





03/17/18 18:00


 Vital Signs











Temperature  96.4 F L  03/17/18 15:09


 


Pulse Rate  76   03/17/18 15:09


 


Respiratory Rate  18   03/17/18 15:09


 


Blood Pressure  118/76   03/17/18 15:09


 


O2 Sat by Pulse Oximetry (%)      








 Laboratory Tests











  03/14/18 03/14/18 03/14/18





  13:12 15:00 16:50


 


WBC   


 


RBC   


 


Hgb   


 


Hct   


 


MCV   


 


MCH   


 


MCHC   


 


RDW   


 


Plt Count   


 


MPV   


 


Sodium   


 


Potassium   


 


Chloride   


 


Carbon Dioxide   


 


Anion Gap   


 


BUN   


 


Creatinine   


 


Creat Clearance w eGFR   


 


POC Glucometer  218   117


 


Random Glucose   


 


Calcium   


 


Total Bilirubin   


 


AST   


 


ALT   


 


Alkaline Phosphatase   


 


Total Protein   


 


Albumin   


 


Urine Color   Tesha 


 


Urine Appearance   Clear 


 


Urine pH   5.0 


 


Ur Specific Gravity   1.037 H 


 


Urine Protein   1+ H 


 


Urine Glucose (UA)   3+ H 


 


Urine Ketones   Trace H 


 


Urine Blood   Negative 


 


Urine Nitrite   Negative 


 


Urine Bilirubin   Negative 


 


Urine Urobilinogen   4.0 e.u/dl 


 


Ur Leukocyte Esterase   Negative 


 


Urine WBC (Auto)   1 


 


Urine RBC (Auto)   1 


 


Ur Epithelial Cells   Rare 


 


Urine Mucus   Many 


 


RPR Titer   














  03/14/18 03/15/18 03/15/18





  21:19 06:00 06:00


 


WBC   12.3 H 


 


RBC   3.81 L 


 


Hgb   11.1 L 


 


Hct   34.4 L 


 


MCV   90.5 


 


MCH   29.2 


 


MCHC   32.3 


 


RDW   16.5 H 


 


Plt Count   246 


 


MPV   11.3 H D 


 


Sodium    141


 


Potassium    3.6


 


Chloride    103


 


Carbon Dioxide    29


 


Anion Gap    9


 


BUN    19 H


 


Creatinine    0.7


 


Creat Clearance w eGFR    > 60


 


POC Glucometer  111  


 


Random Glucose    202 H D


 


Calcium    8.6


 


Total Bilirubin    0.9  D


 


AST    11 L D


 


ALT    14  D


 


Alkaline Phosphatase    59


 


Total Protein    6.5


 


Albumin    3.7  D


 


Urine Color   


 


Urine Appearance   


 


Urine pH   


 


Ur Specific Gravity   


 


Urine Protein   


 


Urine Glucose (UA)   


 


Urine Ketones   


 


Urine Blood   


 


Urine Nitrite   


 


Urine Bilirubin   


 


Urine Urobilinogen   


 


Ur Leukocyte Esterase   


 


Urine WBC (Auto)   


 


Urine RBC (Auto)   


 


Ur Epithelial Cells   


 


Urine Mucus   


 


RPR Titer   














  03/15/18 03/15/18 03/15/18





  06:00 06:00 16:27


 


WBC   


 


RBC   


 


Hgb   


 


Hct   


 


MCV   


 


MCH   


 


MCHC   


 


RDW   


 


Plt Count   


 


MPV   


 


Sodium   


 


Potassium   


 


Chloride   


 


Carbon Dioxide   


 


Anion Gap   


 


BUN   


 


Creatinine   


 


Creat Clearance w eGFR   


 


POC Glucometer   118  114


 


Random Glucose   


 


Calcium   


 


Total Bilirubin   


 


AST   


 


ALT   


 


Alkaline Phosphatase   


 


Total Protein   


 


Albumin   


 


Urine Color   


 


Urine Appearance   


 


Urine pH   


 


Ur Specific Gravity   


 


Urine Protein   


 


Urine Glucose (UA)   


 


Urine Ketones   


 


Urine Blood   


 


Urine Nitrite   


 


Urine Bilirubin   


 


Urine Urobilinogen   


 


Ur Leukocyte Esterase   


 


Urine WBC (Auto)   


 


Urine RBC (Auto)   


 


Ur Epithelial Cells   


 


Urine Mucus   


 


RPR Titer  Nonreactive  














  03/15/18 03/16/18 03/16/18





  21:05 06:10 17:08


 


WBC   


 


RBC   


 


Hgb   


 


Hct   


 


MCV   


 


MCH   


 


MCHC   


 


RDW   


 


Plt Count   


 


MPV   


 


Sodium   


 


Potassium   


 


Chloride   


 


Carbon Dioxide   


 


Anion Gap   


 


BUN   


 


Creatinine   


 


Creat Clearance w eGFR   


 


POC Glucometer  182  112  117


 


Random Glucose   


 


Calcium   


 


Total Bilirubin   


 


AST   


 


ALT   


 


Alkaline Phosphatase   


 


Total Protein   


 


Albumin   


 


Urine Color   


 


Urine Appearance   


 


Urine pH   


 


Ur Specific Gravity   


 


Urine Protein   


 


Urine Glucose (UA)   


 


Urine Ketones   


 


Urine Blood   


 


Urine Nitrite   


 


Urine Bilirubin   


 


Urine Urobilinogen   


 


Ur Leukocyte Esterase   


 


Urine WBC (Auto)   


 


Urine RBC (Auto)   


 


Ur Epithelial Cells   


 


Urine Mucus   


 


RPR Titer   














  03/17/18 03/17/18





  06:56 16:30


 


WBC  


 


RBC  


 


Hgb  


 


Hct  


 


MCV  


 


MCH  


 


MCHC  


 


RDW  


 


Plt Count  


 


MPV  


 


Sodium  


 


Potassium  


 


Chloride  


 


Carbon Dioxide  


 


Anion Gap  


 


BUN  


 


Creatinine  


 


Creat Clearance w eGFR  


 


POC Glucometer  93  115


 


Random Glucose  


 


Calcium  


 


Total Bilirubin  


 


AST  


 


ALT  


 


Alkaline Phosphatase  


 


Total Protein  


 


Albumin  


 


Urine Color  


 


Urine Appearance  


 


Urine pH  


 


Ur Specific Gravity  


 


Urine Protein  


 


Urine Glucose (UA)  


 


Urine Ketones  


 


Urine Blood  


 


Urine Nitrite  


 


Urine Bilirubin  


 


Urine Urobilinogen  


 


Ur Leukocyte Esterase  


 


Urine WBC (Auto)  


 


Urine RBC (Auto)  


 


Ur Epithelial Cells  


 


Urine Mucus  


 


RPR Titer  








LABS NOTED.


Assessment: 





03/17/18 18:01


WITHDRAWAL SYMPTOMS.


Plan: 





CONTINUE DETOX.


RESULTS OF X-RAY OF RIGHT RIBCAGE NOTED. PATIENT ADVISED TO OBTAIN  AT [facility] (NEAR WHERE HE LIVES) TO GO TO FOR MEDICAL ASSESSMENT AND FOR 

FURTHER EVALUATION OF CHEST / RIB DISCOMFORT AFTER DISCHARGE FROM DETOX.

## 2018-03-18 VITALS — HEART RATE: 83 BPM | DIASTOLIC BLOOD PRESSURE: 69 MMHG | SYSTOLIC BLOOD PRESSURE: 116 MMHG | TEMPERATURE: 98 F

## 2018-03-18 RX ADMIN — INSULIN ASPART SCH: 100 INJECTION, SOLUTION INTRAVENOUS; SUBCUTANEOUS at 08:03

## 2018-03-18 RX ADMIN — ALBUTEROL SULFATE PRN PUFF: 90 AEROSOL, METERED RESPIRATORY (INHALATION) at 08:22

## 2018-03-18 RX ADMIN — METFORMIN HYDROCHLORIDE SCH MG: 500 TABLET ORAL at 06:34

## 2018-03-18 NOTE — DS
[facility] Detox Discharge Summary


Admission Date: 


03/14/18





Discharge Date: 03/18/18





- History


Present History: Alcohol Dependence


Pertinent Past History: 





Asthma





DMT2





- Physical Exam Results


Vital Signs: 


 Vital Signs











Temperature  98 F   03/18/18 06:42


 


Pulse Rate  83   03/18/18 06:42


 


Respiratory Rate  16   03/18/18 06:42


 


Blood Pressure  116/69   03/18/18 06:42


 


O2 Sat by Pulse Oximetry (%)      











Pertinent Admission Physical Exam Findings: 





Withdrawal symptoms





- Treatment


Hospital Course: Detox Protocol Followed, Detoxed Safely, Responded well, 

Discharged Condition Good





- Medication


Discharge Medications: 


Ambulatory Orders





Aripiprazole [Abilify -] 10 mg PO DAILY 02/18/18 


Tiotropium Bromide [Spiriva] 2 inh IH DAILY 02/18/18 


Albuterol Sulfate Inhaler - [Ventolin HFA Inhaler -] 2 inh PO Q4H PRN #1 

inhaler 02/21/18 


Aspirin [ASA -] 81 mg PO DAILY #30 tab.chew 02/21/18 


Atorvastatin Ca [Lipitor] 40 mg PO HS #30 tablet 02/21/18 


Budesonide/Formeterol Fumarate [SYMBICORT 160/4.5mcg -] 1 inh PO BID #1 inhaler 

02/21/18 


Lisinopril [Prinivil] 5 mg PO DAILY #30 tablet 02/21/18 


metFORMIN HCL [Glucophage -] 500 mg PO BID #60 tablet 02/21/18 











- Diagnosis


(1) Alcohol dependence with uncomplicated withdrawal


Status: Acute   





(2) Nicotine dependence


Status: Chronic   


Qualifiers: 


   Nicotine product type: cigarettes   Substance use status: in withdrawal   

Qualified Code(s): F17.213 - Nicotine dependence, cigarettes, with withdrawal   





(3) Asthma


Status: Chronic   


Qualifiers: 


   Asthma severity: moderate   Asthma persistence: persistent   Asthma 

complication type: with status asthmaticus   Qualified Code(s): J45.42 - 

Moderate persistent asthma with status asthmaticus   





(4) DM (diabetes mellitus), type 2


Status: Chronic   


Qualifiers: 


   Diabetes mellitus long term insulin use: without long term use   Diabetes 

mellitus complication status: without complication   Qualified Code(s): E11.9 - 

Type 2 diabetes mellitus without complications   





(5) Depression


Status: Chronic   


Qualifiers: 


   Depression Type: dysthymia   Qualified Code(s): F34.1 - Dysthymic disorder   





- AMA


Did Patient Leave Against Medical Advice: No (Follow up with your PCP within 1-

2 weeks)

## 2021-02-25 ENCOUNTER — INPATIENT (INPATIENT)
Facility: HOSPITAL | Age: 56
LOS: 12 days | Discharge: HOME HEALTH | End: 2021-03-10
Attending: INTERNAL MEDICINE | Admitting: INTERNAL MEDICINE
Payer: MEDICAID

## 2021-02-25 VITALS
OXYGEN SATURATION: 85 % | HEART RATE: 101 BPM | RESPIRATION RATE: 20 BRPM | WEIGHT: 169.98 LBS | TEMPERATURE: 98 F | DIASTOLIC BLOOD PRESSURE: 89 MMHG | SYSTOLIC BLOOD PRESSURE: 168 MMHG | HEIGHT: 64 IN

## 2021-02-25 LAB
ALBUMIN SERPL ELPH-MCNC: 3.6 G/DL — SIGNIFICANT CHANGE UP (ref 3.3–5)
ALP SERPL-CCNC: 58 U/L — SIGNIFICANT CHANGE UP (ref 40–120)
ALT FLD-CCNC: 20 U/L — SIGNIFICANT CHANGE UP (ref 12–78)
ANION GAP SERPL CALC-SCNC: 2 MMOL/L — LOW (ref 5–17)
APTT BLD: 29.4 SEC — SIGNIFICANT CHANGE UP (ref 27.5–35.5)
AST SERPL-CCNC: 8 U/L — LOW (ref 15–37)
BASE EXCESS BLDA CALC-SCNC: 0 MMOL/L — SIGNIFICANT CHANGE UP (ref -2–2)
BASOPHILS # BLD AUTO: 0.05 K/UL — SIGNIFICANT CHANGE UP (ref 0–0.2)
BASOPHILS NFR BLD AUTO: 0.6 % — SIGNIFICANT CHANGE UP (ref 0–2)
BILIRUB SERPL-MCNC: 0.2 MG/DL — SIGNIFICANT CHANGE UP (ref 0.2–1.2)
BLOOD GAS COMMENTS: SIGNIFICANT CHANGE UP
BLOOD GAS SOURCE: SIGNIFICANT CHANGE UP
BUN SERPL-MCNC: 22 MG/DL — SIGNIFICANT CHANGE UP (ref 7–23)
CALCIUM SERPL-MCNC: 8.8 MG/DL — SIGNIFICANT CHANGE UP (ref 8.5–10.1)
CHLORIDE SERPL-SCNC: 101 MMOL/L — SIGNIFICANT CHANGE UP (ref 96–108)
CO2 SERPL-SCNC: 42 MMOL/L — HIGH (ref 22–31)
CREAT SERPL-MCNC: 0.72 MG/DL — SIGNIFICANT CHANGE UP (ref 0.5–1.3)
EOSINOPHIL # BLD AUTO: 0.09 K/UL — SIGNIFICANT CHANGE UP (ref 0–0.5)
EOSINOPHIL NFR BLD AUTO: 1 % — SIGNIFICANT CHANGE UP (ref 0–6)
FLUAV AG NPH QL: SIGNIFICANT CHANGE UP
FLUBV AG NPH QL: SIGNIFICANT CHANGE UP
GLUCOSE BLDC GLUCOMTR-MCNC: 252 MG/DL — HIGH (ref 70–99)
GLUCOSE SERPL-MCNC: 129 MG/DL — HIGH (ref 70–99)
HCO3 BLDA-SCNC: 14 MMOL/L — LOW (ref 21–29)
HCT VFR BLD CALC: 43.6 % — SIGNIFICANT CHANGE UP (ref 39–50)
HGB BLD-MCNC: 13.6 G/DL — SIGNIFICANT CHANGE UP (ref 13–17)
HOROWITZ INDEX BLDA+IHG-RTO: 0.1 — SIGNIFICANT CHANGE UP
IMM GRANULOCYTES NFR BLD AUTO: 0.3 % — SIGNIFICANT CHANGE UP (ref 0–1.5)
INR BLD: 1.15 RATIO — SIGNIFICANT CHANGE UP (ref 0.88–1.16)
LACTATE SERPL-SCNC: 1.6 MMOL/L — SIGNIFICANT CHANGE UP (ref 0.7–2)
LYMPHOCYTES # BLD AUTO: 1.74 K/UL — SIGNIFICANT CHANGE UP (ref 1–3.3)
LYMPHOCYTES # BLD AUTO: 19.3 % — SIGNIFICANT CHANGE UP (ref 13–44)
MCHC RBC-ENTMCNC: 28.6 PG — SIGNIFICANT CHANGE UP (ref 27–34)
MCHC RBC-ENTMCNC: 31.2 GM/DL — LOW (ref 32–36)
MCV RBC AUTO: 91.6 FL — SIGNIFICANT CHANGE UP (ref 80–100)
MONOCYTES # BLD AUTO: 1.05 K/UL — HIGH (ref 0–0.9)
MONOCYTES NFR BLD AUTO: 11.6 % — SIGNIFICANT CHANGE UP (ref 2–14)
NEUTROPHILS # BLD AUTO: 6.06 K/UL — SIGNIFICANT CHANGE UP (ref 1.8–7.4)
NEUTROPHILS NFR BLD AUTO: 67.2 % — SIGNIFICANT CHANGE UP (ref 43–77)
NRBC # BLD: 0 /100 WBCS — SIGNIFICANT CHANGE UP (ref 0–0)
NT-PROBNP SERPL-SCNC: 613 PG/ML — HIGH (ref 0–125)
PCO2 BLDA: >101 MMHG — CRITICAL HIGH (ref 32–46)
PH BLD: 7.2 — CRITICAL LOW (ref 7.35–7.45)
PLATELET # BLD AUTO: 280 K/UL — SIGNIFICANT CHANGE UP (ref 150–400)
PO2 BLDA: 75 MMHG — SIGNIFICANT CHANGE UP (ref 74–108)
POTASSIUM SERPL-MCNC: 5 MMOL/L — SIGNIFICANT CHANGE UP (ref 3.5–5.3)
POTASSIUM SERPL-SCNC: 5 MMOL/L — SIGNIFICANT CHANGE UP (ref 3.5–5.3)
PROT SERPL-MCNC: 7.9 GM/DL — SIGNIFICANT CHANGE UP (ref 6–8.3)
PROTHROM AB SERPL-ACNC: 13.3 SEC — SIGNIFICANT CHANGE UP (ref 10.6–13.6)
RBC # BLD: 4.76 M/UL — SIGNIFICANT CHANGE UP (ref 4.2–5.8)
RBC # FLD: 16.9 % — HIGH (ref 10.3–14.5)
SAO2 % BLDA: 92 % — SIGNIFICANT CHANGE UP (ref 92–96)
SARS-COV-2 RNA SPEC QL NAA+PROBE: SIGNIFICANT CHANGE UP
SODIUM SERPL-SCNC: 145 MMOL/L — SIGNIFICANT CHANGE UP (ref 135–145)
TROPONIN I SERPL-MCNC: <.015 NG/ML — SIGNIFICANT CHANGE UP (ref 0.01–0.04)
WBC # BLD: 9.02 K/UL — SIGNIFICANT CHANGE UP (ref 3.8–10.5)
WBC # FLD AUTO: 9.02 K/UL — SIGNIFICANT CHANGE UP (ref 3.8–10.5)

## 2021-02-25 PROCEDURE — 99291 CRITICAL CARE FIRST HOUR: CPT | Mod: 25

## 2021-02-25 PROCEDURE — 99285 EMERGENCY DEPT VISIT HI MDM: CPT

## 2021-02-25 PROCEDURE — 71045 X-RAY EXAM CHEST 1 VIEW: CPT | Mod: 26

## 2021-02-25 PROCEDURE — 32551 INSERTION OF CHEST TUBE: CPT

## 2021-02-25 PROCEDURE — 32556 INSERT CATH PLEURA W/O IMAGE: CPT

## 2021-02-25 PROCEDURE — 71045 X-RAY EXAM CHEST 1 VIEW: CPT | Mod: 26,77

## 2021-02-25 RX ORDER — CHLORHEXIDINE GLUCONATE 213 G/1000ML
1 SOLUTION TOPICAL
Refills: 0 | Status: DISCONTINUED | OUTPATIENT
Start: 2021-02-25 | End: 2021-03-10

## 2021-02-25 RX ORDER — ALBUTEROL 90 UG/1
2.5 AEROSOL, METERED ORAL EVERY 4 HOURS
Refills: 0 | Status: DISCONTINUED | OUTPATIENT
Start: 2021-02-25 | End: 2021-02-26

## 2021-02-25 RX ORDER — CHLORHEXIDINE GLUCONATE 213 G/1000ML
15 SOLUTION TOPICAL ONCE
Refills: 0 | Status: COMPLETED | OUTPATIENT
Start: 2021-02-25 | End: 2021-02-26

## 2021-02-25 RX ORDER — PROPOFOL 10 MG/ML
5 INJECTION, EMULSION INTRAVENOUS
Qty: 1000 | Refills: 0 | Status: DISCONTINUED | OUTPATIENT
Start: 2021-02-25 | End: 2021-03-02

## 2021-02-25 RX ORDER — SODIUM CHLORIDE 9 MG/ML
1000 INJECTION INTRAMUSCULAR; INTRAVENOUS; SUBCUTANEOUS ONCE
Refills: 0 | Status: COMPLETED | OUTPATIENT
Start: 2021-02-25 | End: 2021-02-25

## 2021-02-25 RX ORDER — PIPERACILLIN AND TAZOBACTAM 4; .5 G/20ML; G/20ML
3.38 INJECTION, POWDER, LYOPHILIZED, FOR SOLUTION INTRAVENOUS ONCE
Refills: 0 | Status: COMPLETED | OUTPATIENT
Start: 2021-02-25 | End: 2021-02-25

## 2021-02-25 RX ORDER — ALBUTEROL 90 UG/1
2.5 AEROSOL, METERED ORAL ONCE
Refills: 0 | Status: COMPLETED | OUTPATIENT
Start: 2021-02-25 | End: 2021-02-25

## 2021-02-25 RX ORDER — IPRATROPIUM/ALBUTEROL SULFATE 18-103MCG
3 AEROSOL WITH ADAPTER (GRAM) INHALATION ONCE
Refills: 0 | Status: COMPLETED | OUTPATIENT
Start: 2021-02-25 | End: 2021-02-25

## 2021-02-25 RX ADMIN — Medication 3 MILLILITER(S): at 13:01

## 2021-02-25 RX ADMIN — PROPOFOL 2.31 MICROGRAM(S)/KG/MIN: 10 INJECTION, EMULSION INTRAVENOUS at 23:30

## 2021-02-25 RX ADMIN — PIPERACILLIN AND TAZOBACTAM 200 GRAM(S): 4; .5 INJECTION, POWDER, LYOPHILIZED, FOR SOLUTION INTRAVENOUS at 19:40

## 2021-02-25 RX ADMIN — PIPERACILLIN AND TAZOBACTAM 3.38 GRAM(S): 4; .5 INJECTION, POWDER, LYOPHILIZED, FOR SOLUTION INTRAVENOUS at 21:35

## 2021-02-25 RX ADMIN — Medication 125 MILLIGRAM(S): at 13:01

## 2021-02-25 RX ADMIN — Medication 1 MILLIGRAM(S): at 18:59

## 2021-02-25 RX ADMIN — Medication 2 MILLIGRAM(S): at 19:34

## 2021-02-25 RX ADMIN — SODIUM CHLORIDE 1000 MILLILITER(S): 9 INJECTION INTRAMUSCULAR; INTRAVENOUS; SUBCUTANEOUS at 23:00

## 2021-02-25 NOTE — ED PROVIDER NOTE - CONSTITUTIONAL, MLM
Well appearing, awake, alert, oriented to person, place, time/situation and in no apparent distress. normal... Well appearing, awake, alert, oriented to person, place, time/situation and in mild respiratory distress

## 2021-02-25 NOTE — H&P ADULT - HISTORY OF PRESENT ILLNESS
History obtained from chart as pt is lethargic.     55 year old man with a past medical history of asthma, DM and HTN. Pt presented with generalized weakness, SOB and chest congestion for 2 days. Patient also had a cough which was productive of green sputum. In the ED, ABG showed hypoxia and hypercarbia. He was placed on BIPAP with no much improvement. Patient will be intubated in the ED.      History obtained from chart as pt is lethargic at time of consult.     55 year old man with a past medical history of asthma, DM and HTN. Pt was bought in by EMS with complaints of generalized weakness, SOB and chest congestion for 2 days. Patient also had a cough which was productive of green sputum. In the ED, patient was hypoxic and placed on non rebreather mask, then became agitated, took off the mask and monitor leads then desaturated to 80 %. Patient got ativan. Patient was placed on BIPAP machine. An ABG done showed hypoxia and hypercarbia. BIPAP settings were adjusted with no improvement. After discussion w ED physician, pt will be intubated, go for CT and then admitted to the ICU for further management.

## 2021-02-25 NOTE — ED ADULT NURSE NOTE - OBJECTIVE STATEMENT
Pt received sitting on stretcher in moderate respiratory distress. Pt AOx3 C/o difficulty breathing and coughing up green  phlegm RR labored. Ab soft non tender, + bowel sounds x 4quads. Denies Nausea, Vomiting, Diarrhea. Skin warm, dry, color appropriate for age and race.

## 2021-02-25 NOTE — ED PROVIDER NOTE - OBJECTIVE STATEMENT
55 year old male with PMH of asthma, DM II , HTN presenting to ED due to generalized weakness, SOB and chest congestion for past 2 days. No fever/chills and without body aches. Cough is productive of green sputum

## 2021-02-25 NOTE — H&P ADULT - ATTENDING COMMENTS
54 y/o M w/chronic hypercapnic respiratory failure secondary to severe COPD admitted for acute on chronic respiratory failure likely secondary to COPD exacerbation. No improvement with BiPAP. Post intubation patient was hypoxemic and hypotensive. Found to have large R pneumothorax. Emergent decompression done followed by chest tube placement.     - Sedation goal RASS -4, possible paralytics, patient at high risk for air trapping  - Continue mechanical ventilation  - Chest tube to suction  - Steroids + bronchodilators  - Pressors as needed goal MAP >= 65  - Empiric abx    I have personally provided 50 minutes of attending critical care time independent of NP/PA critical care time and excluding procedures.

## 2021-02-25 NOTE — ED PROVIDER NOTE - CARE PLAN
Principal Discharge DX:	Respiratory failure with hypercapnia, unspecified chronicity  Secondary Diagnosis:	Pneumonia

## 2021-02-25 NOTE — ED PROVIDER NOTE - CLINICAL SUMMARY MEDICAL DECISION MAKING FREE TEXT BOX
Pt with SOB, otherwise pending CTA, signed out to Dr. Power pending test and then will admit for further care as pt is hypoxic to 88% on RA - but did improve on Nasal cannula to 97%.

## 2021-02-25 NOTE — ED PROVIDER NOTE - RESPIRATORY, MLM
Breath sounds clear and equal bilaterally. expiratory wheezing bilaterally,  no retractions or accessory muscle useand equal bilaterally. expiratory wheezing bilaterally,  no retractions or accessory muscle use and equal bilaterally.

## 2021-02-25 NOTE — H&P ADULT - ASSESSMENT
55 year old man with a past medical history of asthma, DM and HTN. Admitted with hypoxic and hypercarbic respiratory failure.         Plan:     Neuro:  Sedation as needed to target RASS 0 to -1      CV:   Continue hemodynamic monitoring       Pulmonary:   Continue with mechanical ventilation.   Settings:   ABG   CXR post intubation   Will get CTA Chest.       GI:   GI prophylaxis with pantoprazole   NPO for now   Will need NGT insertion       Endo:   Monitor fingersticks   Glycemic control with insulin sliding scale as needed       Renal:   will place harrison for accurate urinary monitoring   I and O hourly   Monitor and correct electrolytes      ID:   Empirically on ceftriaxone and azithromycin       Heme:   Repeat labs in am  DVT prophylaxis       Other/Disposition:  Patient will be admitted to the ICU  Code Status: Full code    Case discussed with ICU attending.    55 year old man with a past medical history of asthma, DM and HTN. Admitted with hypoxic and hypercarbic respiratory failure.        Plan:     Neuro:  Sedation as needed to target RASS 0 to -1      CV:   Continue hemodynamic monitoring   IV fluids as needed      Pulmonary:   Continue with mechanical ventilation after intubation   ABG post intubation   CXR post intubation   Will get CTA Chest if stable       GI:   GI prophylaxis with pantoprazole   NPO for now   Will need NGT insertion       Endo:   Monitor fingersticks   Glycemic control with insulin sliding scale as needed       Renal:   will place harrison for accurate urinary monitoring   I and O hourly   Monitor and correct electrolytes      ID:   Empirically on ceftriaxone and azithromycin       Heme:   Repeat labs in am  DVT prophylaxis       Other/Disposition:  Patient will be admitted to the ICU  Code Status: Full code    Case discussed with ICU attending.

## 2021-02-25 NOTE — H&P ADULT - NSHPPHYSICALEXAM_GEN_ALL_CORE
Neuro: lethargic but opens eyes to physical stimuli.   Lungs: clear to ascultation, bilateral air entry   Heart: S1, S2, no murmur heard   Abdomen: soft, non tender, BS heard   Ext: no edema, pulses present Neuro: lethargic but opens eyes to physical stimuli. Not speaking   Lungs: clear to ascultation, decrease air entry at bases  Heart: S1, S2, no murmur heard   Abdomen: soft, non tender, BS heard   Ext: no edema, pulses present

## 2021-02-25 NOTE — ED ADULT NURSE NOTE - ED STAT RN HANDOFF DETAILS
report received from nurse noble, pt was agitated at bedside, took monitor off himself, pt desat to 80%, md made aware, ativan was ordered and was given, pt was encouraged back to bed, placed back on monitor, v/s done, placed back on oxygen and saturation went up to 93%, will continue plan of care.

## 2021-02-25 NOTE — ED ADULT TRIAGE NOTE - CHIEF COMPLAINT QUOTE
ems states, " pt from group home, and has c/o pt falling in the past week, pt was found with 83% o2 sat , on 2L 94@, no know hx , denies fever, chills , cough

## 2021-02-25 NOTE — ED PROVIDER NOTE - PROGRESS NOTE DETAILS
PAtient signed out by Dr. Evans, pending CTA, admission for hypoxia, denies chest pain, lungs clear, no leg swelling, patient requires ativan to help facilitate ongoing care. PAtient declining, started on Bipap with some improvement, ICU called for evaluation, CTA still pending. Patient intubated for worsening status, ICU will admit and follow up CTA. Patient intubated for worsening status, no difficulty with tube placement, ICU will admit and follow up CTA. Patient intubated, follow up CXR shows tube in good position, however tension pneumo on right with alina out left lung, ICU present and patient admitted, needle decompression and pigtail cath placed by ICU team, suspect ruptured bleb that occurred at some point, unclear if prior to or after bipap.

## 2021-02-26 DIAGNOSIS — J96.92 RESPIRATORY FAILURE, UNSPECIFIED WITH HYPERCAPNIA: ICD-10-CM

## 2021-02-26 DIAGNOSIS — J18.9 PNEUMONIA, UNSPECIFIED ORGANISM: ICD-10-CM

## 2021-02-26 LAB
ALBUMIN SERPL ELPH-MCNC: 2.9 G/DL — LOW (ref 3.3–5)
ALP SERPL-CCNC: 51 U/L — SIGNIFICANT CHANGE UP (ref 40–120)
ALT FLD-CCNC: 42 U/L — SIGNIFICANT CHANGE UP (ref 12–78)
ANION GAP SERPL CALC-SCNC: 6 MMOL/L — SIGNIFICANT CHANGE UP (ref 5–17)
ANION GAP SERPL CALC-SCNC: 7 MMOL/L — SIGNIFICANT CHANGE UP (ref 5–17)
APPEARANCE UR: CLEAR — SIGNIFICANT CHANGE UP
AST SERPL-CCNC: 52 U/L — HIGH (ref 15–37)
BACTERIA # UR AUTO: ABNORMAL
BASE EXCESS BLDA CALC-SCNC: 5.4 MMOL/L — HIGH (ref -2–2)
BASE EXCESS BLDA CALC-SCNC: 7.9 MMOL/L — HIGH (ref -2–2)
BASE EXCESS BLDA CALC-SCNC: 8.2 MMOL/L — HIGH (ref -2–2)
BASOPHILS # BLD AUTO: 0.02 K/UL — SIGNIFICANT CHANGE UP (ref 0–0.2)
BASOPHILS NFR BLD AUTO: 0.2 % — SIGNIFICANT CHANGE UP (ref 0–2)
BILIRUB SERPL-MCNC: 0.4 MG/DL — SIGNIFICANT CHANGE UP (ref 0.2–1.2)
BILIRUB UR-MCNC: NEGATIVE — SIGNIFICANT CHANGE UP
BLOOD GAS COMMENTS: SIGNIFICANT CHANGE UP
BLOOD GAS SOURCE: SIGNIFICANT CHANGE UP
BUN SERPL-MCNC: 28 MG/DL — HIGH (ref 7–23)
BUN SERPL-MCNC: 39 MG/DL — HIGH (ref 7–23)
CALCIUM SERPL-MCNC: 7.8 MG/DL — LOW (ref 8.5–10.1)
CALCIUM SERPL-MCNC: 8.2 MG/DL — LOW (ref 8.5–10.1)
CHLORIDE SERPL-SCNC: 100 MMOL/L — SIGNIFICANT CHANGE UP (ref 96–108)
CHLORIDE SERPL-SCNC: 103 MMOL/L — SIGNIFICANT CHANGE UP (ref 96–108)
CO2 SERPL-SCNC: 33 MMOL/L — HIGH (ref 22–31)
CO2 SERPL-SCNC: 37 MMOL/L — HIGH (ref 22–31)
COLOR SPEC: YELLOW — SIGNIFICANT CHANGE UP
CREAT SERPL-MCNC: 1.66 MG/DL — HIGH (ref 0.5–1.3)
CREAT SERPL-MCNC: 2.18 MG/DL — HIGH (ref 0.5–1.3)
D DIMER BLD IA.RAPID-MCNC: 348 NG/ML DDU — HIGH
DIFF PNL FLD: NEGATIVE — SIGNIFICANT CHANGE UP
EOSINOPHIL # BLD AUTO: 0 K/UL — SIGNIFICANT CHANGE UP (ref 0–0.5)
EOSINOPHIL NFR BLD AUTO: 0 % — SIGNIFICANT CHANGE UP (ref 0–6)
EPI CELLS # UR: ABNORMAL
GLUCOSE BLDC GLUCOMTR-MCNC: 139 MG/DL — HIGH (ref 70–99)
GLUCOSE BLDC GLUCOMTR-MCNC: 147 MG/DL — HIGH (ref 70–99)
GLUCOSE BLDC GLUCOMTR-MCNC: 203 MG/DL — HIGH (ref 70–99)
GLUCOSE BLDC GLUCOMTR-MCNC: 245 MG/DL — HIGH (ref 70–99)
GLUCOSE SERPL-MCNC: 169 MG/DL — HIGH (ref 70–99)
GLUCOSE SERPL-MCNC: 178 MG/DL — HIGH (ref 70–99)
GLUCOSE UR QL: NEGATIVE MG/DL — SIGNIFICANT CHANGE UP
GRAM STN FLD: SIGNIFICANT CHANGE UP
HCO3 BLDA-SCNC: 34 MMOL/L — HIGH (ref 21–29)
HCO3 BLDA-SCNC: 35 MMOL/L — HIGH (ref 21–29)
HCO3 BLDA-SCNC: 37 MMOL/L — HIGH (ref 21–29)
HCT VFR BLD CALC: 43.9 % — SIGNIFICANT CHANGE UP (ref 39–50)
HCV AB S/CO SERPL IA: 0.1 S/CO — SIGNIFICANT CHANGE UP (ref 0–0.99)
HCV AB SERPL-IMP: SIGNIFICANT CHANGE UP
HGB BLD-MCNC: 13.1 G/DL — SIGNIFICANT CHANGE UP (ref 13–17)
HOROWITZ INDEX BLDA+IHG-RTO: 100 — SIGNIFICANT CHANGE UP
IMM GRANULOCYTES NFR BLD AUTO: 0.4 % — SIGNIFICANT CHANGE UP (ref 0–1.5)
KETONES UR-MCNC: NEGATIVE — SIGNIFICANT CHANGE UP
LEGIONELLA AG UR QL: NEGATIVE — SIGNIFICANT CHANGE UP
LEUKOCYTE ESTERASE UR-ACNC: ABNORMAL
LYMPHOCYTES # BLD AUTO: 0.66 K/UL — LOW (ref 1–3.3)
LYMPHOCYTES # BLD AUTO: 7.1 % — LOW (ref 13–44)
MAGNESIUM SERPL-MCNC: 1.9 MG/DL — SIGNIFICANT CHANGE UP (ref 1.6–2.6)
MCHC RBC-ENTMCNC: 28.2 PG — SIGNIFICANT CHANGE UP (ref 27–34)
MCHC RBC-ENTMCNC: 29.8 GM/DL — LOW (ref 32–36)
MCV RBC AUTO: 94.6 FL — SIGNIFICANT CHANGE UP (ref 80–100)
MONOCYTES # BLD AUTO: 1.02 K/UL — HIGH (ref 0–0.9)
MONOCYTES NFR BLD AUTO: 11 % — SIGNIFICANT CHANGE UP (ref 2–14)
NEUTROPHILS # BLD AUTO: 7.54 K/UL — HIGH (ref 1.8–7.4)
NEUTROPHILS NFR BLD AUTO: 81.3 % — HIGH (ref 43–77)
NITRITE UR-MCNC: NEGATIVE — SIGNIFICANT CHANGE UP
NRBC # BLD: 0 /100 WBCS — SIGNIFICANT CHANGE UP (ref 0–0)
PCO2 BLDA: 50 MMHG — HIGH (ref 32–46)
PCO2 BLDA: 65 MMHG — HIGH (ref 32–46)
PCO2 BLDA: 94 MMHG — CRITICAL HIGH (ref 32–46)
PH BLD: 7.22 — LOW (ref 7.35–7.45)
PH BLD: 7.36 — SIGNIFICANT CHANGE UP (ref 7.35–7.45)
PH BLD: 7.44 — SIGNIFICANT CHANGE UP (ref 7.35–7.45)
PH UR: 5 — SIGNIFICANT CHANGE UP (ref 5–8)
PHOSPHATE SERPL-MCNC: 5.4 MG/DL — HIGH (ref 2.5–4.5)
PLATELET # BLD AUTO: 238 K/UL — SIGNIFICANT CHANGE UP (ref 150–400)
PO2 BLDA: 126 MMHG — HIGH (ref 74–108)
PO2 BLDA: 48 MMHG — CRITICAL LOW (ref 74–108)
PO2 BLDA: <47 MMHG — CRITICAL LOW (ref 74–108)
POTASSIUM SERPL-MCNC: 4.5 MMOL/L — SIGNIFICANT CHANGE UP (ref 3.5–5.3)
POTASSIUM SERPL-MCNC: 5.1 MMOL/L — SIGNIFICANT CHANGE UP (ref 3.5–5.3)
POTASSIUM SERPL-SCNC: 4.5 MMOL/L — SIGNIFICANT CHANGE UP (ref 3.5–5.3)
POTASSIUM SERPL-SCNC: 5.1 MMOL/L — SIGNIFICANT CHANGE UP (ref 3.5–5.3)
PROCALCITONIN SERPL-MCNC: 9.14 NG/ML — HIGH (ref 0.02–0.1)
PROT SERPL-MCNC: 6.4 GM/DL — SIGNIFICANT CHANGE UP (ref 6–8.3)
PROT UR-MCNC: 30 MG/DL
RBC # BLD: 4.64 M/UL — SIGNIFICANT CHANGE UP (ref 4.2–5.8)
RBC # FLD: 16.7 % — HIGH (ref 10.3–14.5)
RBC CASTS # UR COMP ASSIST: SIGNIFICANT CHANGE UP /HPF (ref 0–4)
SAO2 % BLDA: 70 % — LOW (ref 92–96)
SAO2 % BLDA: 83 % — LOW (ref 92–96)
SAO2 % BLDA: 99 % — HIGH (ref 92–96)
SARS-COV-2 IGG SERPL QL IA: NEGATIVE — SIGNIFICANT CHANGE UP
SARS-COV-2 IGM SERPL IA-ACNC: 0.09 INDEX — SIGNIFICANT CHANGE UP
SODIUM SERPL-SCNC: 143 MMOL/L — SIGNIFICANT CHANGE UP (ref 135–145)
SODIUM SERPL-SCNC: 143 MMOL/L — SIGNIFICANT CHANGE UP (ref 135–145)
SP GR SPEC: 1.02 — SIGNIFICANT CHANGE UP (ref 1.01–1.02)
SPECIMEN SOURCE: SIGNIFICANT CHANGE UP
UROBILINOGEN FLD QL: NEGATIVE MG/DL — SIGNIFICANT CHANGE UP
WBC # BLD: 9.28 K/UL — SIGNIFICANT CHANGE UP (ref 3.8–10.5)
WBC # FLD AUTO: 9.28 K/UL — SIGNIFICANT CHANGE UP (ref 3.8–10.5)
WBC UR QL: SIGNIFICANT CHANGE UP

## 2021-02-26 PROCEDURE — 93306 TTE W/DOPPLER COMPLETE: CPT | Mod: 26

## 2021-02-26 PROCEDURE — 71045 X-RAY EXAM CHEST 1 VIEW: CPT | Mod: 26,76

## 2021-02-26 PROCEDURE — 99291 CRITICAL CARE FIRST HOUR: CPT

## 2021-02-26 RX ORDER — SODIUM CHLORIDE 9 MG/ML
10 INJECTION INTRAMUSCULAR; INTRAVENOUS; SUBCUTANEOUS
Refills: 0 | Status: DISCONTINUED | OUTPATIENT
Start: 2021-02-26 | End: 2021-03-10

## 2021-02-26 RX ORDER — ENOXAPARIN SODIUM 100 MG/ML
40 INJECTION SUBCUTANEOUS DAILY
Refills: 0 | Status: DISCONTINUED | OUTPATIENT
Start: 2021-02-26 | End: 2021-03-10

## 2021-02-26 RX ORDER — SODIUM CHLORIDE 9 MG/ML
3 INJECTION INTRAMUSCULAR; INTRAVENOUS; SUBCUTANEOUS EVERY 4 HOURS
Refills: 0 | Status: DISCONTINUED | OUTPATIENT
Start: 2021-02-26 | End: 2021-02-28

## 2021-02-26 RX ORDER — CEFTRIAXONE 500 MG/1
1000 INJECTION, POWDER, FOR SOLUTION INTRAMUSCULAR; INTRAVENOUS EVERY 24 HOURS
Refills: 0 | Status: COMPLETED | OUTPATIENT
Start: 2021-02-26 | End: 2021-03-04

## 2021-02-26 RX ORDER — IPRATROPIUM/ALBUTEROL SULFATE 18-103MCG
3 AEROSOL WITH ADAPTER (GRAM) INHALATION EVERY 4 HOURS
Refills: 0 | Status: DISCONTINUED | OUTPATIENT
Start: 2021-02-26 | End: 2021-02-28

## 2021-02-26 RX ORDER — FENTANYL CITRATE 50 UG/ML
0.5 INJECTION INTRAVENOUS
Qty: 2500 | Refills: 0 | Status: DISCONTINUED | OUTPATIENT
Start: 2021-02-26 | End: 2021-03-02

## 2021-02-26 RX ORDER — NOREPINEPHRINE BITARTRATE/D5W 8 MG/250ML
0.05 PLASTIC BAG, INJECTION (ML) INTRAVENOUS
Qty: 8 | Refills: 0 | Status: DISCONTINUED | OUTPATIENT
Start: 2021-02-26 | End: 2021-03-01

## 2021-02-26 RX ORDER — SODIUM CHLORIDE 9 MG/ML
1000 INJECTION, SOLUTION INTRAVENOUS ONCE
Refills: 0 | Status: COMPLETED | OUTPATIENT
Start: 2021-02-26 | End: 2021-02-26

## 2021-02-26 RX ORDER — INSULIN LISPRO 100/ML
VIAL (ML) SUBCUTANEOUS EVERY 6 HOURS
Refills: 0 | Status: DISCONTINUED | OUTPATIENT
Start: 2021-02-26 | End: 2021-03-04

## 2021-02-26 RX ORDER — SODIUM CHLORIDE 9 MG/ML
1000 INJECTION INTRAMUSCULAR; INTRAVENOUS; SUBCUTANEOUS ONCE
Refills: 0 | Status: COMPLETED | OUTPATIENT
Start: 2021-02-26 | End: 2021-02-26

## 2021-02-26 RX ORDER — AZITHROMYCIN 500 MG/1
TABLET, FILM COATED ORAL
Refills: 0 | Status: DISCONTINUED | OUTPATIENT
Start: 2021-02-26 | End: 2021-03-02

## 2021-02-26 RX ORDER — FENTANYL CITRATE 50 UG/ML
100 INJECTION INTRAVENOUS ONCE
Refills: 0 | Status: DISCONTINUED | OUTPATIENT
Start: 2021-02-26 | End: 2021-02-26

## 2021-02-26 RX ORDER — NICOTINE POLACRILEX 2 MG
1 GUM BUCCAL DAILY
Refills: 0 | Status: DISCONTINUED | OUTPATIENT
Start: 2021-02-26 | End: 2021-03-02

## 2021-02-26 RX ORDER — PANTOPRAZOLE SODIUM 20 MG/1
40 TABLET, DELAYED RELEASE ORAL DAILY
Refills: 0 | Status: DISCONTINUED | OUTPATIENT
Start: 2021-02-26 | End: 2021-03-04

## 2021-02-26 RX ORDER — AZITHROMYCIN 500 MG/1
500 TABLET, FILM COATED ORAL EVERY 24 HOURS
Refills: 0 | Status: DISCONTINUED | OUTPATIENT
Start: 2021-02-27 | End: 2021-03-02

## 2021-02-26 RX ORDER — SENNA PLUS 8.6 MG/1
2 TABLET ORAL AT BEDTIME
Refills: 0 | Status: DISCONTINUED | OUTPATIENT
Start: 2021-02-26 | End: 2021-03-10

## 2021-02-26 RX ORDER — SODIUM CHLORIDE 9 MG/ML
1000 INJECTION, SOLUTION INTRAVENOUS
Refills: 0 | Status: DISCONTINUED | OUTPATIENT
Start: 2021-02-26 | End: 2021-02-28

## 2021-02-26 RX ORDER — POLYETHYLENE GLYCOL 3350 17 G/17G
17 POWDER, FOR SOLUTION ORAL DAILY
Refills: 0 | Status: DISCONTINUED | OUTPATIENT
Start: 2021-02-26 | End: 2021-03-10

## 2021-02-26 RX ORDER — AZITHROMYCIN 500 MG/1
500 TABLET, FILM COATED ORAL ONCE
Refills: 0 | Status: COMPLETED | OUTPATIENT
Start: 2021-02-26 | End: 2021-02-26

## 2021-02-26 RX ADMIN — CHLORHEXIDINE GLUCONATE 1 APPLICATION(S): 213 SOLUTION TOPICAL at 06:13

## 2021-02-26 RX ADMIN — SODIUM CHLORIDE 250 MILLILITER(S): 9 INJECTION INTRAMUSCULAR; INTRAVENOUS; SUBCUTANEOUS at 19:00

## 2021-02-26 RX ADMIN — Medication 40 MILLIGRAM(S): at 15:18

## 2021-02-26 RX ADMIN — SODIUM CHLORIDE 1000 MILLILITER(S): 9 INJECTION, SOLUTION INTRAVENOUS at 02:19

## 2021-02-26 RX ADMIN — PANTOPRAZOLE SODIUM 40 MILLIGRAM(S): 20 TABLET, DELAYED RELEASE ORAL at 12:01

## 2021-02-26 RX ADMIN — SODIUM CHLORIDE 3 MILLILITER(S): 9 INJECTION INTRAMUSCULAR; INTRAVENOUS; SUBCUTANEOUS at 17:09

## 2021-02-26 RX ADMIN — PROPOFOL 2.31 MICROGRAM(S)/KG/MIN: 10 INJECTION, EMULSION INTRAVENOUS at 07:35

## 2021-02-26 RX ADMIN — ENOXAPARIN SODIUM 40 MILLIGRAM(S): 100 INJECTION SUBCUTANEOUS at 12:01

## 2021-02-26 RX ADMIN — POLYETHYLENE GLYCOL 3350 17 GRAM(S): 17 POWDER, FOR SOLUTION ORAL at 12:01

## 2021-02-26 RX ADMIN — Medication 1 PATCH: at 19:41

## 2021-02-26 RX ADMIN — FENTANYL CITRATE 3.86 MICROGRAM(S)/KG/HR: 50 INJECTION INTRAVENOUS at 01:31

## 2021-02-26 RX ADMIN — Medication 3 MILLILITER(S): at 13:23

## 2021-02-26 RX ADMIN — ALBUTEROL 2.5 MILLIGRAM(S): 90 AEROSOL, METERED ORAL at 01:13

## 2021-02-26 RX ADMIN — Medication 7.23 MICROGRAM(S)/KG/MIN: at 02:18

## 2021-02-26 RX ADMIN — CEFTRIAXONE 100 MILLIGRAM(S): 500 INJECTION, POWDER, FOR SOLUTION INTRAMUSCULAR; INTRAVENOUS at 03:09

## 2021-02-26 RX ADMIN — Medication 7.23 MICROGRAM(S)/KG/MIN: at 23:59

## 2021-02-26 RX ADMIN — ALBUTEROL 2.5 MILLIGRAM(S): 90 AEROSOL, METERED ORAL at 01:55

## 2021-02-26 RX ADMIN — SODIUM CHLORIDE 3 MILLILITER(S): 9 INJECTION INTRAMUSCULAR; INTRAVENOUS; SUBCUTANEOUS at 20:29

## 2021-02-26 RX ADMIN — PROPOFOL 2.31 MICROGRAM(S)/KG/MIN: 10 INJECTION, EMULSION INTRAVENOUS at 01:33

## 2021-02-26 RX ADMIN — Medication 40 MILLIGRAM(S): at 06:13

## 2021-02-26 RX ADMIN — SODIUM CHLORIDE 125 MILLILITER(S): 9 INJECTION, SOLUTION INTRAVENOUS at 20:31

## 2021-02-26 RX ADMIN — Medication 3 MILLILITER(S): at 10:29

## 2021-02-26 RX ADMIN — FENTANYL CITRATE 3.86 MICROGRAM(S)/KG/HR: 50 INJECTION INTRAVENOUS at 12:05

## 2021-02-26 RX ADMIN — PROPOFOL 2.31 MICROGRAM(S)/KG/MIN: 10 INJECTION, EMULSION INTRAVENOUS at 17:24

## 2021-02-26 RX ADMIN — Medication 4: at 06:13

## 2021-02-26 RX ADMIN — Medication 1 PATCH: at 12:02

## 2021-02-26 RX ADMIN — PROPOFOL 2.31 MICROGRAM(S)/KG/MIN: 10 INJECTION, EMULSION INTRAVENOUS at 21:06

## 2021-02-26 RX ADMIN — FENTANYL CITRATE 3.86 MICROGRAM(S)/KG/HR: 50 INJECTION INTRAVENOUS at 23:59

## 2021-02-26 RX ADMIN — Medication 40 MILLIGRAM(S): at 01:30

## 2021-02-26 RX ADMIN — SODIUM CHLORIDE 3 MILLILITER(S): 9 INJECTION INTRAMUSCULAR; INTRAVENOUS; SUBCUTANEOUS at 10:30

## 2021-02-26 RX ADMIN — AZITHROMYCIN 500 MILLIGRAM(S): 500 TABLET, FILM COATED ORAL at 03:26

## 2021-02-26 RX ADMIN — Medication 3 MILLILITER(S): at 05:10

## 2021-02-26 RX ADMIN — SODIUM CHLORIDE 3 MILLILITER(S): 9 INJECTION INTRAMUSCULAR; INTRAVENOUS; SUBCUTANEOUS at 13:23

## 2021-02-26 RX ADMIN — Medication 3 MILLILITER(S): at 20:27

## 2021-02-26 RX ADMIN — CHLORHEXIDINE GLUCONATE 15 MILLILITER(S): 213 SOLUTION TOPICAL at 07:51

## 2021-02-26 RX ADMIN — Medication 40 MILLIGRAM(S): at 21:44

## 2021-02-26 RX ADMIN — SODIUM CHLORIDE 1000 MILLILITER(S): 9 INJECTION, SOLUTION INTRAVENOUS at 09:59

## 2021-02-26 RX ADMIN — SENNA PLUS 2 TABLET(S): 8.6 TABLET ORAL at 21:44

## 2021-02-26 RX ADMIN — PROPOFOL 2.31 MICROGRAM(S)/KG/MIN: 10 INJECTION, EMULSION INTRAVENOUS at 12:01

## 2021-02-26 RX ADMIN — FENTANYL CITRATE 100 MICROGRAM(S): 50 INJECTION INTRAVENOUS at 00:34

## 2021-02-26 RX ADMIN — Medication 3 MILLILITER(S): at 17:08

## 2021-02-26 RX ADMIN — Medication 4: at 23:55

## 2021-02-26 NOTE — PROCEDURE NOTE - ADDITIONAL PROCEDURE DETAILS
Needle decompression was done first.   Then chest tube placement was done.   Patient tolerated the procedure well.   Air bubbles noted.   Saturation starting to improve. Needle decompression was done first.   Then chest tube placement was done.   Patient tolerated the procedure well.   Air bubbles noted.   Saturation starting to improve.  CXR showed reexpansion of the lung

## 2021-02-26 NOTE — DIETITIAN INITIAL EVALUATION ADULT. - DIET TYPE
Recommend OGT feeding c VitalAF1.2 @ 20->40 ml/hr as tolerated = 1152 Calories, 72 grams protein, 81% RDIs, 779 ml fluid ( additional calories from propofol as per infused volume noted)/NPO with tube feedings

## 2021-02-26 NOTE — PATIENT PROFILE ADULT - FUNCTIONAL SCREEN CURRENT LEVEL: COMMUNICATION, MLM
2 = difficulty understanding and speaking (not related to language barrier) 0 = understands/communicates without difficulty

## 2021-02-26 NOTE — CHART NOTE - NSCHARTNOTEFT_GEN_A_CORE
The patient was intubated by the ED physician. Post intubation, the saturation were mostly in 70's. The peep was 15 and FiO2 100 %. Peak airway pressure was 56. BP also low requiring levophed. He was on low dose propofol for sedation. The CXR was done which showed large right pneumothorax. Needle decompression was done. Then pigtail chest tube was inserted. Post chest tube placement showed resolution of the pneumothorax. The chest tube was connected to suction and the saturation improved to 89-90%. Levophed was weaned off.   Patient will be transferred to the ICU for further management.

## 2021-02-26 NOTE — PROCEDURE NOTE - NSPROCDETAILS_GEN_ALL_CORE
guidewire recovered/sterile dressing applied
Seldinger technique/dressing applied/secured in place/thoracostomy tube placed percutaneously

## 2021-02-26 NOTE — CHART NOTE - NSCHARTNOTEFT_GEN_A_CORE
eICU contacted for hypoxemia and hypotension.  COPD requiring intubation, now with pneumothorax s/p chest tube placement  Was taken off suction for transport from ED to ICU.  Now persistently hypotensive and hypoxemic.  POCU/S shows minimal / no lung sliding on R (where chest tube is placed) and extensive B-lines in anterior fields on L.  CXR shows no definitive evidence of residual pneumothorax on R with chest tube (pigtail) at bottom. It is possible that there is an anteriorly placed pocket of air that cannot be seen on CXR. However, the chest tube is back to suction, shows a leak, and appears to be tidalling.  L side shows worsening opacification and mediastinal shift towards that side.  Etiology of these issues is unclear. Any residual pneumothorax should not be causing such a significant amount of hypoxemia and hypotension given that the tube is in place.  The worsening opacification is also of unclear etiology. ?mucous plugging ?fluid overload  Per bedside, cardiac contractility is normal, making fluid overload / unilateral ADHF less likely.  Aggressive chest PT and pulmonary toilet.  Try to minimize PEEP (although 100%/+10 is required at a minimum at present).  Reviewed case with bedside. Recommend goals of care discussion if possible (no known NoK in chart, no contact information)  Given inability to oxygenate and underlying poor pulmonary status (COPD / emphysema), overall prognosis is poor. eICU contacted for hypoxemia and hypotension.  COPD requiring intubation, now with pneumothorax s/p chest tube placement  Was taken off suction for transport from ED to ICU.  Now persistently hypotensive and hypoxemic.  POCU/S shows minimal / no lung sliding on R (where chest tube is placed) and extensive B-lines in anterior fields on L.  CXR shows no definitive evidence of residual pneumothorax on R with chest tube (pigtail) at bottom. It is possible that there is an anteriorly placed pocket of air that cannot be seen on CXR. However, the chest tube is back to suction, shows a leak, and appears to be tidalling.  L side shows worsening opacification and mediastinal shift towards that side.  Etiology of these issues is unclear. Any residual pneumothorax should not be causing such a significant amount of hypoxemia and hypotension given that the tube is in place.  The worsening opacification is also of unclear etiology. ?mucous plugging ?fluid overload  Per bedside, cardiac contractility is normal, making fluid overload / unilateral ADHF less likely.  Aggressive chest PT and pulmonary toilet.  Try to minimize PEEP (although 100%/+10 is required at a minimum at present).  Reviewed case with bedside. Recommend goals of care discussion if possible (no known NoK in chart, no contact information)  Given inability to oxygenate and underlying poor pulmonary status, overall prognosis is poor.

## 2021-02-26 NOTE — CONSULT NOTE ADULT - SUBJECTIVE AND OBJECTIVE BOX
[facility] NEPHROLOGY SERVICES, [facility]  NEPHROLOGY AND HYPERTENSION  [address]  [address]  [place] [address]  [phone]    MD PETAR PENA MD ANDREY GONCHARUK, MD MADHU KORRAPATI, MD YELENA ROSENBERG, MD BINNY KOSHY, MD CHRISTOPHER CAPUTO, MD EDWARD BOVER, MD      Information from chart:  "Patient is a 55y old  Male who presents with a chief complaint of Hypoxic and hypercarbic respiratory failure (2021 11:40)    HPI:  History obtained from chart as pt is lethargic at time of consult.     55 year old man with a past medical history of asthma, DM and HTN. Pt was bought in by EMS with complaints of generalized weakness, SOB and chest congestion for 2 days. Patient also had a cough which was productive of green sputum. In the ED, patient was hypoxic and placed on non rebreather mask, then became agitated, took off the mask and monitor leads then desaturated to 80 %. Patient got ativan. Patient was placed on BIPAP machine. An ABG done showed hypoxia and hypercarbia. BIPAP settings were adjusted with no improvement. After discussion w ED physician, pt will be intubated, go for CT and then admitted to the ICU for further management.      (2021 22:49)   "  Noted low MAPs; KATINA; oliguric  Increased UO with fluid bolus;       PAST MEDICAL & SURGICAL HISTORY:    FAMILY HISTORY:    Allergies    No Known Allergies    Intolerances      Home Medications:    MEDICATIONS  (STANDING):  albuterol/ipratropium for Nebulization 3 milliLiter(s) Nebulizer every 4 hours  azithromycin  IVPB      cefTRIAXone   IVPB 1000 milliGRAM(s) IV Intermittent every 24 hours  chlorhexidine 4% Liquid 1 Application(s) Topical <User Schedule>  enoxaparin Injectable 40 milliGRAM(s) SubCutaneous daily  fentaNYL   Infusion. 0.5 MICROgram(s)/kG/Hr (3.86 mL/Hr) IV Continuous <Continuous>  insulin lispro (ADMELOG) corrective regimen sliding scale   SubCutaneous every 6 hours  methylPREDNISolone sodium succinate Injectable 40 milliGRAM(s) IV Push every 8 hours  nicotine -   7 mG/24Hr(s) Patch 1 patch Transdermal daily  norepinephrine Infusion 0.05 MICROgram(s)/kG/Min (7.23 mL/Hr) IV Continuous <Continuous>  pantoprazole  Injectable 40 milliGRAM(s) IV Push daily  polyethylene glycol 3350 17 Gram(s) Oral daily  propofol Infusion 5 MICROgram(s)/kG/Min (2.31 mL/Hr) IV Continuous <Continuous>  senna 2 Tablet(s) Oral at bedtime  sodium chloride 3%  Inhalation 3 milliLiter(s) Inhalation every 4 hours    MEDICATIONS  (PRN):  sodium chloride 0.9% lock flush 10 milliLiter(s) IV Push every 1 hour PRN Pre/post blood products, medications, blood draw, and to maintain line patency    Vital Signs Last 24 Hrs  T(C): 37 (2021 15:45), Max: 38.5 (2021 04:50)  T(F): 98.6 (2021 15:45), Max: 101.3 (2021 04:50)  HR: 62 (2021 16:32) (59 - 126)  BP: 77/54 (2021 16:00) (68/47 - 200/100)  BP(mean): 63 (2021 16:00) (61 - 123)  RR: 23 (2021 16:00) (11 - 51)  SpO2: 95% (2021 16:32) (62% - 100%)  Mode: AC/ CMV (Assist Control/ Continuous Mandatory Ventilation)  RR (machine): 23  TV (machine): 450  FiO2: 40  PEEP: 5  ITime: 0.7  MAP: 10  PIP: 25    Daily     Daily Weight in k.1 (2021 01:15)    21 @ 07:01  -  21 @ 07:00  --------------------------------------------------------  IN: 1098.6 mL / OUT: 153 mL / NET: 945.6 mL    21 @ 07:01  -  21 @ 17:17  --------------------------------------------------------  IN: 499.9 mL / OUT: 195 mL / NET: 304.9 mL      CAPILLARY BLOOD GLUCOSE      POCT Blood Glucose.: 147 mg/dL (2021 17:12)  POCT Blood Glucose.: 139 mg/dL (2021 11:27)  POCT Blood Glucose.: 203 mg/dL (2021 05:29)  POCT Blood Glucose.: 252 mg/dL (2021 23:32)    PHYSICAL EXAM:      T(C): 37 (21 @ 15:45), Max: 38.5 (21 @ 04:50)  HR: 62 (21 @ 16:32) (59 - 126)  BP: 77/54 (21 @ 16:00) (68/47 - 200/100)  RR: 23 (21 @ 16:00) (11 - 51)  SpO2: 95% (21 @ 16:32) (62% - 100%)  Wt(kg): --  Lungs rhonchi anteriorly; decreased BS Left  Heart S1S2  Abd soft NT ND  Extremities:   tr edema                  143  |  100  |  28<H>  ----------------------------<  178<H>  5.1   |  37<H>  |  1.66<H>    Ca    8.2<L>      2021 03:17  Phos  5.4       Mg     1.9         TPro  6.4  /  Alb  2.9<L>  /  TBili  0.4  /  DBili  x   /  AST  52<H>  /  ALT  42  /  AlkPhos  51                            13.1   9.28  )-----------( 238      ( 2021 03:17 )             43.9     Creatinine Trend: 1.66<--, 0.72<--    ABG - ( 2021 06:04 )  pH, Arterial: x     pH, Blood: 7.44  /  pCO2: 50    /  pO2: 126   / HCO3: 34    / Base Excess: 8.2   /  SaO2: 99                    Assessment   KATINA suspected pre renal azotemia, risk for ischemic ATN    Plan  UA micro, urine Na  Judicious IVF bolus;   Further recs pending results and course.     Grzegorz Gauthier MD

## 2021-02-26 NOTE — ED ADULT NURSE REASSESSMENT NOTE - NS ED NURSE REASSESS COMMENT FT1
F/C 16 fr was placed in at this time, output of 100cc noted, will continue to monitor.
at bedside, pt on bipap and still maintained o2 at 89%, icu consult was done, recommended for pt to be intubated, pt was given iv etom 25mg. dwight 100 via iv, ET tube placed at this time, lip 24/7.5, OG tube was placed as well, propfol started, iv fluids as well, pt with vent setting and not sating well.
chest x-ray was done at this time, pt noted with pneumothorax, icu preping for chest tube placement
levo was started due to low bp, maintained on tele monitoring, ET tube in place pt still sating at 70%, icu was called back to evaluate pt, md at bedside with respiratory as well, chest xray was done, pt noted with pneumothorax, chest tube to be placed at this time.
pt on ET tube to vent setting, OG tube in place as well to intermit suctioning, tele monitoring maintained, peripheral line intact,, iv levo on hold, iv propofol ongoing, iv fluids .S, completed, F/C intact and pt voiding, icu at bedside, chest tube to right chest wall and attached to wall intermit suctioning as well, saturation improved after chest tube placement, , safety maintained, report given to receiving icu nurse, pt to be transferred up to unit after repeat chest x-ray was completed.
pt in bed in NAD, stated he feels much better and breathing is easier than before . no c/o pain at this time , safety and comfort maintained

## 2021-02-26 NOTE — DIETITIAN INITIAL EVALUATION ADULT. - PHYSCIAL ASSESSMENT
Limited nutrition focus physical due to vent status, poor condition of feet, long toe nails noted BMI=19.3(02/26), no edema noted/underweight/other (specify)

## 2021-02-26 NOTE — DIETITIAN INITIAL EVALUATION ADULT. - PERTINENT LABORATORY DATA
02-26 Na143 mmol/L Glu 178 mg/dL<H> K+ 5.1 mmol/L Cr  1.66 mg/dL<H> BUN 28 mg/dL<H> 02-26 Phos 5.4 mg/dL<H> 02-26 Alb 2.9 g/dL<L>02-26 ALT 42 U/L AST 52 U/L<H> Alkaline Phosphatase 51 U/L  A1C% not available

## 2021-02-26 NOTE — DIETITIAN INITIAL EVALUATION ADULT. - PERTINENT MEDS FT
MEDICATIONS  (STANDING):  albuterol/ipratropium for Nebulization 3 milliLiter(s) Nebulizer every 4 hours  azithromycin  IVPB      cefTRIAXone   IVPB 1000 milliGRAM(s) IV Intermittent every 24 hours  chlorhexidine 4% Liquid 1 Application(s) Topical <User Schedule>  enoxaparin Injectable 40 milliGRAM(s) SubCutaneous daily  fentaNYL   Infusion. 0.5 MICROgram(s)/kG/Hr (3.86 mL/Hr) IV Continuous <Continuous>  insulin lispro (ADMELOG) corrective regimen sliding scale   SubCutaneous every 6 hours  methylPREDNISolone sodium succinate Injectable 40 milliGRAM(s) IV Push every 8 hours  nicotine -   7 mG/24Hr(s) Patch 1 patch Transdermal daily  norepinephrine Infusion 0.05 MICROgram(s)/kG/Min (7.23 mL/Hr) IV Continuous <Continuous>  pantoprazole  Injectable 40 milliGRAM(s) IV Push daily  polyethylene glycol 3350 17 Gram(s) Oral daily  propofol Infusion 5 MICROgram(s)/kG/Min (2.31 mL/Hr) IV Continuous <Continuous>=127 ml(02/25)= 139.7 calories   senna 2 Tablet(s) Oral at bedtime  sodium chloride 3%  Inhalation 3 milliLiter(s) Inhalation every 4 hours    MEDICATIONS  (PRN):  sodium chloride 0.9% lock flush 10 milliLiter(s) IV Push every 1 hour PRN Pre/post blood products, medications, blood draw, and to maintain line patency

## 2021-02-26 NOTE — DIETITIAN INITIAL EVALUATION ADULT. - OTHER INFO
Pt c OGT in place.   PMH include asthma, DM (med PTA not noted at present), HTN.  Problem dx include pneumonia, elevated renal indices noted, plan for renal consult.

## 2021-02-27 LAB
ALBUMIN SERPL ELPH-MCNC: 2.5 G/DL — LOW (ref 3.3–5)
ALP SERPL-CCNC: 44 U/L — SIGNIFICANT CHANGE UP (ref 40–120)
ALT FLD-CCNC: 29 U/L — SIGNIFICANT CHANGE UP (ref 12–78)
ANION GAP SERPL CALC-SCNC: 5 MMOL/L — SIGNIFICANT CHANGE UP (ref 5–17)
AST SERPL-CCNC: 13 U/L — LOW (ref 15–37)
BILIRUB SERPL-MCNC: 0.2 MG/DL — SIGNIFICANT CHANGE UP (ref 0.2–1.2)
BUN SERPL-MCNC: 41 MG/DL — HIGH (ref 7–23)
CALCIUM SERPL-MCNC: 7.6 MG/DL — LOW (ref 8.5–10.1)
CHLORIDE SERPL-SCNC: 102 MMOL/L — SIGNIFICANT CHANGE UP (ref 96–108)
CO2 SERPL-SCNC: 32 MMOL/L — HIGH (ref 22–31)
CREAT ?TM UR-MCNC: 279 MG/DL — SIGNIFICANT CHANGE UP
CREAT SERPL-MCNC: 2 MG/DL — HIGH (ref 0.5–1.3)
GLUCOSE BLDC GLUCOMTR-MCNC: 231 MG/DL — HIGH (ref 70–99)
GLUCOSE BLDC GLUCOMTR-MCNC: 236 MG/DL — HIGH (ref 70–99)
GLUCOSE BLDC GLUCOMTR-MCNC: 260 MG/DL — HIGH (ref 70–99)
GLUCOSE SERPL-MCNC: 317 MG/DL — HIGH (ref 70–99)
HCT VFR BLD CALC: 37.7 % — LOW (ref 39–50)
HGB BLD-MCNC: 12.1 G/DL — LOW (ref 13–17)
MAGNESIUM SERPL-MCNC: 2.2 MG/DL — SIGNIFICANT CHANGE UP (ref 1.6–2.6)
MCHC RBC-ENTMCNC: 28.7 PG — SIGNIFICANT CHANGE UP (ref 27–34)
MCHC RBC-ENTMCNC: 32.1 GM/DL — SIGNIFICANT CHANGE UP (ref 32–36)
MCV RBC AUTO: 89.5 FL — SIGNIFICANT CHANGE UP (ref 80–100)
NRBC # BLD: 0 /100 WBCS — SIGNIFICANT CHANGE UP (ref 0–0)
PHOSPHATE SERPL-MCNC: 4 MG/DL — SIGNIFICANT CHANGE UP (ref 2.5–4.5)
PLATELET # BLD AUTO: 200 K/UL — SIGNIFICANT CHANGE UP (ref 150–400)
POTASSIUM SERPL-MCNC: 4.6 MMOL/L — SIGNIFICANT CHANGE UP (ref 3.5–5.3)
POTASSIUM SERPL-SCNC: 4.6 MMOL/L — SIGNIFICANT CHANGE UP (ref 3.5–5.3)
PROT ?TM UR-MCNC: 66 MG/DL — HIGH (ref 0–12)
PROT SERPL-MCNC: 5.8 GM/DL — LOW (ref 6–8.3)
PROT/CREAT UR-RTO: 0.2 RATIO — SIGNIFICANT CHANGE UP (ref 0–0.2)
RBC # BLD: 4.21 M/UL — SIGNIFICANT CHANGE UP (ref 4.2–5.8)
RBC # FLD: 17 % — HIGH (ref 10.3–14.5)
SODIUM SERPL-SCNC: 139 MMOL/L — SIGNIFICANT CHANGE UP (ref 135–145)
SODIUM UR-SCNC: <20 MMOL/L — SIGNIFICANT CHANGE UP
WBC # BLD: 12.84 K/UL — HIGH (ref 3.8–10.5)
WBC # FLD AUTO: 12.84 K/UL — HIGH (ref 3.8–10.5)

## 2021-02-27 PROCEDURE — 99291 CRITICAL CARE FIRST HOUR: CPT

## 2021-02-27 PROCEDURE — 71045 X-RAY EXAM CHEST 1 VIEW: CPT | Mod: 26

## 2021-02-27 RX ORDER — DEXTROSE 50 % IN WATER 50 %
25 SYRINGE (ML) INTRAVENOUS ONCE
Refills: 0 | Status: DISCONTINUED | OUTPATIENT
Start: 2021-02-27 | End: 2021-03-10

## 2021-02-27 RX ORDER — DEXTROSE 50 % IN WATER 50 %
15 SYRINGE (ML) INTRAVENOUS ONCE
Refills: 0 | Status: DISCONTINUED | OUTPATIENT
Start: 2021-02-27 | End: 2021-03-10

## 2021-02-27 RX ORDER — HUMAN INSULIN 100 [IU]/ML
4 INJECTION, SUSPENSION SUBCUTANEOUS EVERY 6 HOURS
Refills: 0 | Status: DISCONTINUED | OUTPATIENT
Start: 2021-02-27 | End: 2021-02-27

## 2021-02-27 RX ORDER — GLUCAGON INJECTION, SOLUTION 0.5 MG/.1ML
1 INJECTION, SOLUTION SUBCUTANEOUS ONCE
Refills: 0 | Status: DISCONTINUED | OUTPATIENT
Start: 2021-02-27 | End: 2021-03-10

## 2021-02-27 RX ORDER — INSULIN GLARGINE 100 [IU]/ML
7 INJECTION, SOLUTION SUBCUTANEOUS AT BEDTIME
Refills: 0 | Status: DISCONTINUED | OUTPATIENT
Start: 2021-02-27 | End: 2021-03-02

## 2021-02-27 RX ORDER — SODIUM CHLORIDE 9 MG/ML
1000 INJECTION, SOLUTION INTRAVENOUS
Refills: 0 | Status: DISCONTINUED | OUTPATIENT
Start: 2021-02-27 | End: 2021-03-10

## 2021-02-27 RX ORDER — DEXTROSE 50 % IN WATER 50 %
12.5 SYRINGE (ML) INTRAVENOUS ONCE
Refills: 0 | Status: DISCONTINUED | OUTPATIENT
Start: 2021-02-27 | End: 2021-03-10

## 2021-02-27 RX ADMIN — PROPOFOL 2.31 MICROGRAM(S)/KG/MIN: 10 INJECTION, EMULSION INTRAVENOUS at 15:17

## 2021-02-27 RX ADMIN — Medication 1 PATCH: at 12:13

## 2021-02-27 RX ADMIN — SODIUM CHLORIDE 3 MILLILITER(S): 9 INJECTION INTRAMUSCULAR; INTRAVENOUS; SUBCUTANEOUS at 06:01

## 2021-02-27 RX ADMIN — FENTANYL CITRATE 3.86 MICROGRAM(S)/KG/HR: 50 INJECTION INTRAVENOUS at 10:57

## 2021-02-27 RX ADMIN — PANTOPRAZOLE SODIUM 40 MILLIGRAM(S): 20 TABLET, DELAYED RELEASE ORAL at 11:03

## 2021-02-27 RX ADMIN — SODIUM CHLORIDE 3 MILLILITER(S): 9 INJECTION INTRAMUSCULAR; INTRAVENOUS; SUBCUTANEOUS at 00:54

## 2021-02-27 RX ADMIN — SODIUM CHLORIDE 125 MILLILITER(S): 9 INJECTION, SOLUTION INTRAVENOUS at 15:02

## 2021-02-27 RX ADMIN — PROPOFOL 2.31 MICROGRAM(S)/KG/MIN: 10 INJECTION, EMULSION INTRAVENOUS at 05:29

## 2021-02-27 RX ADMIN — Medication 1 PATCH: at 19:15

## 2021-02-27 RX ADMIN — Medication 1 PATCH: at 07:30

## 2021-02-27 RX ADMIN — Medication 4: at 17:11

## 2021-02-27 RX ADMIN — Medication 3 MILLILITER(S): at 22:00

## 2021-02-27 RX ADMIN — FENTANYL CITRATE 3.86 MICROGRAM(S)/KG/HR: 50 INJECTION INTRAVENOUS at 21:48

## 2021-02-27 RX ADMIN — Medication 3 MILLILITER(S): at 00:53

## 2021-02-27 RX ADMIN — Medication 40 MILLIGRAM(S): at 05:29

## 2021-02-27 RX ADMIN — Medication 4: at 05:33

## 2021-02-27 RX ADMIN — SODIUM CHLORIDE 125 MILLILITER(S): 9 INJECTION, SOLUTION INTRAVENOUS at 05:29

## 2021-02-27 RX ADMIN — CHLORHEXIDINE GLUCONATE 1 APPLICATION(S): 213 SOLUTION TOPICAL at 06:29

## 2021-02-27 RX ADMIN — POLYETHYLENE GLYCOL 3350 17 GRAM(S): 17 POWDER, FOR SOLUTION ORAL at 11:03

## 2021-02-27 RX ADMIN — Medication 40 MILLIGRAM(S): at 13:28

## 2021-02-27 RX ADMIN — AZITHROMYCIN 255 MILLIGRAM(S): 500 TABLET, FILM COATED ORAL at 00:00

## 2021-02-27 RX ADMIN — Medication 40 MILLIGRAM(S): at 21:46

## 2021-02-27 RX ADMIN — Medication 6: at 12:12

## 2021-02-27 RX ADMIN — CEFTRIAXONE 100 MILLIGRAM(S): 500 INJECTION, POWDER, FOR SOLUTION INTRAMUSCULAR; INTRAVENOUS at 03:44

## 2021-02-27 RX ADMIN — SODIUM CHLORIDE 3 MILLILITER(S): 9 INJECTION INTRAMUSCULAR; INTRAVENOUS; SUBCUTANEOUS at 17:33

## 2021-02-27 RX ADMIN — ENOXAPARIN SODIUM 40 MILLIGRAM(S): 100 INJECTION SUBCUTANEOUS at 11:03

## 2021-02-27 RX ADMIN — Medication 3 MILLILITER(S): at 09:38

## 2021-02-27 RX ADMIN — Medication 3 MILLILITER(S): at 17:31

## 2021-02-27 RX ADMIN — Medication 3 MILLILITER(S): at 06:01

## 2021-02-27 RX ADMIN — PROPOFOL 2.31 MICROGRAM(S)/KG/MIN: 10 INJECTION, EMULSION INTRAVENOUS at 21:48

## 2021-02-27 RX ADMIN — SENNA PLUS 2 TABLET(S): 8.6 TABLET ORAL at 21:46

## 2021-02-28 LAB
A1C WITH ESTIMATED AVERAGE GLUCOSE RESULT: 7.3 % — HIGH (ref 4–5.6)
ALBUMIN SERPL ELPH-MCNC: 2 G/DL — LOW (ref 3.3–5)
ALP SERPL-CCNC: 39 U/L — LOW (ref 40–120)
ALT FLD-CCNC: 21 U/L — SIGNIFICANT CHANGE UP (ref 12–78)
ANION GAP SERPL CALC-SCNC: 4 MMOL/L — LOW (ref 5–17)
AST SERPL-CCNC: 13 U/L — LOW (ref 15–37)
BASE EXCESS BLDA CALC-SCNC: 3.5 MMOL/L — HIGH (ref -2–2)
BILIRUB SERPL-MCNC: 0.1 MG/DL — LOW (ref 0.2–1.2)
BLOOD GAS COMMENTS: SIGNIFICANT CHANGE UP
BLOOD GAS COMMENTS: SIGNIFICANT CHANGE UP
BLOOD GAS SOURCE: SIGNIFICANT CHANGE UP
BUN SERPL-MCNC: 39 MG/DL — HIGH (ref 7–23)
CALCIUM SERPL-MCNC: 6.9 MG/DL — LOW (ref 8.5–10.1)
CHLORIDE SERPL-SCNC: 111 MMOL/L — HIGH (ref 96–108)
CO2 SERPL-SCNC: 29 MMOL/L — SIGNIFICANT CHANGE UP (ref 22–31)
CREAT SERPL-MCNC: 1.23 MG/DL — SIGNIFICANT CHANGE UP (ref 0.5–1.3)
CULTURE RESULTS: SIGNIFICANT CHANGE UP
ESTIMATED AVERAGE GLUCOSE: 163 MG/DL — HIGH (ref 68–114)
GLUCOSE BLDC GLUCOMTR-MCNC: 180 MG/DL — HIGH (ref 70–99)
GLUCOSE BLDC GLUCOMTR-MCNC: 203 MG/DL — HIGH (ref 70–99)
GLUCOSE BLDC GLUCOMTR-MCNC: 257 MG/DL — HIGH (ref 70–99)
GLUCOSE BLDC GLUCOMTR-MCNC: 275 MG/DL — HIGH (ref 70–99)
GLUCOSE SERPL-MCNC: 293 MG/DL — HIGH (ref 70–99)
GRAM STN FLD: SIGNIFICANT CHANGE UP
HCO3 BLDA-SCNC: 30 MMOL/L — HIGH (ref 21–29)
HCT VFR BLD CALC: 34.9 % — LOW (ref 39–50)
HGB BLD-MCNC: 11.3 G/DL — LOW (ref 13–17)
HOROWITZ INDEX BLDA+IHG-RTO: 40 — SIGNIFICANT CHANGE UP
MAGNESIUM SERPL-MCNC: 2.5 MG/DL — SIGNIFICANT CHANGE UP (ref 1.6–2.6)
MCHC RBC-ENTMCNC: 29 PG — SIGNIFICANT CHANGE UP (ref 27–34)
MCHC RBC-ENTMCNC: 32.4 GM/DL — SIGNIFICANT CHANGE UP (ref 32–36)
MCV RBC AUTO: 89.5 FL — SIGNIFICANT CHANGE UP (ref 80–100)
NRBC # BLD: 0 /100 WBCS — SIGNIFICANT CHANGE UP (ref 0–0)
PCO2 BLDA: 56 MMHG — HIGH (ref 32–46)
PH BLD: 7.34 — LOW (ref 7.35–7.45)
PHOSPHATE SERPL-MCNC: 2.6 MG/DL — SIGNIFICANT CHANGE UP (ref 2.5–4.5)
PLATELET # BLD AUTO: 179 K/UL — SIGNIFICANT CHANGE UP (ref 150–400)
PO2 BLDA: 65 MMHG — LOW (ref 74–108)
POTASSIUM SERPL-MCNC: 4.3 MMOL/L — SIGNIFICANT CHANGE UP (ref 3.5–5.3)
POTASSIUM SERPL-SCNC: 4.3 MMOL/L — SIGNIFICANT CHANGE UP (ref 3.5–5.3)
PROT SERPL-MCNC: 5 GM/DL — LOW (ref 6–8.3)
RBC # BLD: 3.9 M/UL — LOW (ref 4.2–5.8)
RBC # FLD: 17.2 % — HIGH (ref 10.3–14.5)
SAO2 % BLDA: 90 % — LOW (ref 92–96)
SODIUM SERPL-SCNC: 144 MMOL/L — SIGNIFICANT CHANGE UP (ref 135–145)
SPECIMEN SOURCE: SIGNIFICANT CHANGE UP
WBC # BLD: 13.78 K/UL — HIGH (ref 3.8–10.5)
WBC # FLD AUTO: 13.78 K/UL — HIGH (ref 3.8–10.5)

## 2021-02-28 PROCEDURE — 71045 X-RAY EXAM CHEST 1 VIEW: CPT | Mod: 26

## 2021-02-28 PROCEDURE — 99291 CRITICAL CARE FIRST HOUR: CPT

## 2021-02-28 RX ORDER — FUROSEMIDE 40 MG
40 TABLET ORAL ONCE
Refills: 0 | Status: COMPLETED | OUTPATIENT
Start: 2021-02-28 | End: 2021-02-28

## 2021-02-28 RX ORDER — DEXMEDETOMIDINE HYDROCHLORIDE IN 0.9% SODIUM CHLORIDE 4 UG/ML
0.2 INJECTION INTRAVENOUS
Qty: 200 | Refills: 0 | Status: DISCONTINUED | OUTPATIENT
Start: 2021-02-28 | End: 2021-03-04

## 2021-02-28 RX ORDER — IPRATROPIUM/ALBUTEROL SULFATE 18-103MCG
3 AEROSOL WITH ADAPTER (GRAM) INHALATION EVERY 6 HOURS
Refills: 0 | Status: DISCONTINUED | OUTPATIENT
Start: 2021-02-28 | End: 2021-03-10

## 2021-02-28 RX ORDER — SODIUM CHLORIDE 9 MG/ML
3 INJECTION INTRAMUSCULAR; INTRAVENOUS; SUBCUTANEOUS EVERY 6 HOURS
Refills: 0 | Status: DISCONTINUED | OUTPATIENT
Start: 2021-02-28 | End: 2021-03-10

## 2021-02-28 RX ORDER — ALBUTEROL 90 UG/1
2.5 AEROSOL, METERED ORAL EVERY 4 HOURS
Refills: 0 | Status: DISCONTINUED | OUTPATIENT
Start: 2021-02-28 | End: 2021-03-10

## 2021-02-28 RX ORDER — DEXMEDETOMIDINE HYDROCHLORIDE IN 0.9% SODIUM CHLORIDE 4 UG/ML
0.5 INJECTION INTRAVENOUS
Qty: 200 | Refills: 0 | Status: DISCONTINUED | OUTPATIENT
Start: 2021-02-28 | End: 2021-02-28

## 2021-02-28 RX ADMIN — Medication 4: at 12:16

## 2021-02-28 RX ADMIN — Medication 3 MILLILITER(S): at 10:29

## 2021-02-28 RX ADMIN — PANTOPRAZOLE SODIUM 40 MILLIGRAM(S): 20 TABLET, DELAYED RELEASE ORAL at 11:56

## 2021-02-28 RX ADMIN — PROPOFOL 2.31 MICROGRAM(S)/KG/MIN: 10 INJECTION, EMULSION INTRAVENOUS at 05:34

## 2021-02-28 RX ADMIN — PROPOFOL 2.31 MICROGRAM(S)/KG/MIN: 10 INJECTION, EMULSION INTRAVENOUS at 19:02

## 2021-02-28 RX ADMIN — Medication 1 PATCH: at 19:34

## 2021-02-28 RX ADMIN — Medication 3 MILLILITER(S): at 00:18

## 2021-02-28 RX ADMIN — SODIUM CHLORIDE 3 MILLILITER(S): 9 INJECTION INTRAMUSCULAR; INTRAVENOUS; SUBCUTANEOUS at 04:39

## 2021-02-28 RX ADMIN — ENOXAPARIN SODIUM 40 MILLIGRAM(S): 100 INJECTION SUBCUTANEOUS at 11:56

## 2021-02-28 RX ADMIN — Medication 3 MILLILITER(S): at 04:39

## 2021-02-28 RX ADMIN — POLYETHYLENE GLYCOL 3350 17 GRAM(S): 17 POWDER, FOR SOLUTION ORAL at 11:56

## 2021-02-28 RX ADMIN — Medication 1 PATCH: at 07:30

## 2021-02-28 RX ADMIN — CHLORHEXIDINE GLUCONATE 1 APPLICATION(S): 213 SOLUTION TOPICAL at 06:06

## 2021-02-28 RX ADMIN — Medication 6: at 00:13

## 2021-02-28 RX ADMIN — Medication 1 PATCH: at 11:56

## 2021-02-28 RX ADMIN — Medication 40 MILLIGRAM(S): at 19:37

## 2021-02-28 RX ADMIN — FENTANYL CITRATE 3.86 MICROGRAM(S)/KG/HR: 50 INJECTION INTRAVENOUS at 09:54

## 2021-02-28 RX ADMIN — SENNA PLUS 2 TABLET(S): 8.6 TABLET ORAL at 21:14

## 2021-02-28 RX ADMIN — SODIUM CHLORIDE 3 MILLILITER(S): 9 INJECTION INTRAMUSCULAR; INTRAVENOUS; SUBCUTANEOUS at 00:20

## 2021-02-28 RX ADMIN — SODIUM CHLORIDE 3 MILLILITER(S): 9 INJECTION INTRAMUSCULAR; INTRAVENOUS; SUBCUTANEOUS at 00:18

## 2021-02-28 RX ADMIN — CEFTRIAXONE 100 MILLIGRAM(S): 500 INJECTION, POWDER, FOR SOLUTION INTRAMUSCULAR; INTRAVENOUS at 03:53

## 2021-02-28 RX ADMIN — Medication 1 PATCH: at 12:00

## 2021-02-28 RX ADMIN — AZITHROMYCIN 255 MILLIGRAM(S): 500 TABLET, FILM COATED ORAL at 00:13

## 2021-02-28 RX ADMIN — DEXMEDETOMIDINE HYDROCHLORIDE IN 0.9% SODIUM CHLORIDE 2.56 MICROGRAM(S)/KG/HR: 4 INJECTION INTRAVENOUS at 21:58

## 2021-02-28 RX ADMIN — Medication 3 MILLILITER(S): at 17:19

## 2021-02-28 RX ADMIN — SODIUM CHLORIDE 3 MILLILITER(S): 9 INJECTION INTRAMUSCULAR; INTRAVENOUS; SUBCUTANEOUS at 10:30

## 2021-02-28 RX ADMIN — FENTANYL CITRATE 3.86 MICROGRAM(S)/KG/HR: 50 INJECTION INTRAVENOUS at 19:37

## 2021-02-28 RX ADMIN — SODIUM CHLORIDE 3 MILLILITER(S): 9 INJECTION INTRAMUSCULAR; INTRAVENOUS; SUBCUTANEOUS at 17:19

## 2021-02-28 RX ADMIN — PROPOFOL 2.31 MICROGRAM(S)/KG/MIN: 10 INJECTION, EMULSION INTRAVENOUS at 12:28

## 2021-02-28 RX ADMIN — PROPOFOL 2.31 MICROGRAM(S)/KG/MIN: 10 INJECTION, EMULSION INTRAVENOUS at 21:14

## 2021-02-28 RX ADMIN — Medication 2: at 17:33

## 2021-02-28 RX ADMIN — Medication 6: at 05:22

## 2021-02-28 RX ADMIN — Medication 40 MILLIGRAM(S): at 17:33

## 2021-02-28 RX ADMIN — Medication 40 MILLIGRAM(S): at 05:22

## 2021-02-28 RX ADMIN — INSULIN GLARGINE 7 UNIT(S): 100 INJECTION, SOLUTION SUBCUTANEOUS at 21:13

## 2021-03-01 LAB
ALBUMIN SERPL ELPH-MCNC: 2.3 G/DL — LOW (ref 3.3–5)
ALP SERPL-CCNC: 51 U/L — SIGNIFICANT CHANGE UP (ref 40–120)
ALT FLD-CCNC: 27 U/L — SIGNIFICANT CHANGE UP (ref 12–78)
ANION GAP SERPL CALC-SCNC: 6 MMOL/L — SIGNIFICANT CHANGE UP (ref 5–17)
AST SERPL-CCNC: 24 U/L — SIGNIFICANT CHANGE UP (ref 15–37)
BASE EXCESS BLDA CALC-SCNC: 6.5 MMOL/L — HIGH (ref -2–2)
BASOPHILS # BLD AUTO: 0.02 K/UL — SIGNIFICANT CHANGE UP (ref 0–0.2)
BASOPHILS NFR BLD AUTO: 0.1 % — SIGNIFICANT CHANGE UP (ref 0–2)
BILIRUB SERPL-MCNC: 0.3 MG/DL — SIGNIFICANT CHANGE UP (ref 0.2–1.2)
BLOOD GAS COMMENTS: SIGNIFICANT CHANGE UP
BLOOD GAS COMMENTS: SIGNIFICANT CHANGE UP
BLOOD GAS SOURCE: SIGNIFICANT CHANGE UP
BUN SERPL-MCNC: 31 MG/DL — HIGH (ref 7–23)
CALCIUM SERPL-MCNC: 8.2 MG/DL — LOW (ref 8.5–10.1)
CHLORIDE SERPL-SCNC: 106 MMOL/L — SIGNIFICANT CHANGE UP (ref 96–108)
CO2 SERPL-SCNC: 32 MMOL/L — HIGH (ref 22–31)
CREAT SERPL-MCNC: 1.1 MG/DL — SIGNIFICANT CHANGE UP (ref 0.5–1.3)
EOSINOPHIL # BLD AUTO: 0 K/UL — SIGNIFICANT CHANGE UP (ref 0–0.5)
EOSINOPHIL NFR BLD AUTO: 0 % — SIGNIFICANT CHANGE UP (ref 0–6)
GLUCOSE BLDC GLUCOMTR-MCNC: 129 MG/DL — HIGH (ref 70–99)
GLUCOSE BLDC GLUCOMTR-MCNC: 139 MG/DL — HIGH (ref 70–99)
GLUCOSE BLDC GLUCOMTR-MCNC: 149 MG/DL — HIGH (ref 70–99)
GLUCOSE BLDC GLUCOMTR-MCNC: 202 MG/DL — HIGH (ref 70–99)
GLUCOSE BLDC GLUCOMTR-MCNC: 217 MG/DL — HIGH (ref 70–99)
GLUCOSE SERPL-MCNC: 188 MG/DL — HIGH (ref 70–99)
HCO3 BLDA-SCNC: 33 MMOL/L — HIGH (ref 21–29)
HCT VFR BLD CALC: 36.9 % — LOW (ref 39–50)
HGB BLD-MCNC: 11.7 G/DL — LOW (ref 13–17)
HOROWITZ INDEX BLDA+IHG-RTO: 40 — SIGNIFICANT CHANGE UP
IMM GRANULOCYTES NFR BLD AUTO: 1.4 % — SIGNIFICANT CHANGE UP (ref 0–1.5)
LYMPHOCYTES # BLD AUTO: 0.7 K/UL — LOW (ref 1–3.3)
LYMPHOCYTES # BLD AUTO: 4 % — LOW (ref 13–44)
MAGNESIUM SERPL-MCNC: 2.7 MG/DL — HIGH (ref 1.6–2.6)
MCHC RBC-ENTMCNC: 28.6 PG — SIGNIFICANT CHANGE UP (ref 27–34)
MCHC RBC-ENTMCNC: 31.7 GM/DL — LOW (ref 32–36)
MCV RBC AUTO: 90.2 FL — SIGNIFICANT CHANGE UP (ref 80–100)
MONOCYTES # BLD AUTO: 1.61 K/UL — HIGH (ref 0–0.9)
MONOCYTES NFR BLD AUTO: 9.2 % — SIGNIFICANT CHANGE UP (ref 2–14)
NEUTROPHILS # BLD AUTO: 14.91 K/UL — HIGH (ref 1.8–7.4)
NEUTROPHILS NFR BLD AUTO: 85.3 % — HIGH (ref 43–77)
NRBC # BLD: 0 /100 WBCS — SIGNIFICANT CHANGE UP (ref 0–0)
PCO2 BLDA: 58 MMHG — HIGH (ref 32–46)
PH BLD: 7.37 — SIGNIFICANT CHANGE UP (ref 7.35–7.45)
PHOSPHATE SERPL-MCNC: 2.2 MG/DL — LOW (ref 2.5–4.5)
PLATELET # BLD AUTO: 219 K/UL — SIGNIFICANT CHANGE UP (ref 150–400)
PO2 BLDA: 79 MMHG — SIGNIFICANT CHANGE UP (ref 74–108)
POTASSIUM SERPL-MCNC: 4.1 MMOL/L — SIGNIFICANT CHANGE UP (ref 3.5–5.3)
POTASSIUM SERPL-SCNC: 4.1 MMOL/L — SIGNIFICANT CHANGE UP (ref 3.5–5.3)
PROT SERPL-MCNC: 6 GM/DL — SIGNIFICANT CHANGE UP (ref 6–8.3)
RBC # BLD: 4.09 M/UL — LOW (ref 4.2–5.8)
RBC # FLD: 17.1 % — HIGH (ref 10.3–14.5)
SAO2 % BLDA: 94 % — SIGNIFICANT CHANGE UP (ref 92–96)
SODIUM SERPL-SCNC: 144 MMOL/L — SIGNIFICANT CHANGE UP (ref 135–145)
WBC # BLD: 17.48 K/UL — HIGH (ref 3.8–10.5)
WBC # FLD AUTO: 17.48 K/UL — HIGH (ref 3.8–10.5)

## 2021-03-01 PROCEDURE — 71045 X-RAY EXAM CHEST 1 VIEW: CPT | Mod: 26

## 2021-03-01 PROCEDURE — 99291 CRITICAL CARE FIRST HOUR: CPT

## 2021-03-01 RX ORDER — FUROSEMIDE 40 MG
40 TABLET ORAL ONCE
Refills: 0 | Status: COMPLETED | OUTPATIENT
Start: 2021-03-01 | End: 2021-03-01

## 2021-03-01 RX ORDER — FENTANYL CITRATE 50 UG/ML
50 INJECTION INTRAVENOUS ONCE
Refills: 0 | Status: DISCONTINUED | OUTPATIENT
Start: 2021-03-01 | End: 2021-03-01

## 2021-03-01 RX ORDER — POTASSIUM PHOSPHATE, MONOBASIC POTASSIUM PHOSPHATE, DIBASIC 236; 224 MG/ML; MG/ML
15 INJECTION, SOLUTION INTRAVENOUS ONCE
Refills: 0 | Status: COMPLETED | OUTPATIENT
Start: 2021-03-01 | End: 2021-03-01

## 2021-03-01 RX ADMIN — AZITHROMYCIN 255 MILLIGRAM(S): 500 TABLET, FILM COATED ORAL at 00:19

## 2021-03-01 RX ADMIN — POLYETHYLENE GLYCOL 3350 17 GRAM(S): 17 POWDER, FOR SOLUTION ORAL at 11:36

## 2021-03-01 RX ADMIN — Medication 1 PATCH: at 19:40

## 2021-03-01 RX ADMIN — POTASSIUM PHOSPHATE, MONOBASIC POTASSIUM PHOSPHATE, DIBASIC 62.5 MILLIMOLE(S): 236; 224 INJECTION, SOLUTION INTRAVENOUS at 05:51

## 2021-03-01 RX ADMIN — Medication 3 MILLILITER(S): at 00:42

## 2021-03-01 RX ADMIN — FENTANYL CITRATE 50 MICROGRAM(S): 50 INJECTION INTRAVENOUS at 20:19

## 2021-03-01 RX ADMIN — DEXMEDETOMIDINE HYDROCHLORIDE IN 0.9% SODIUM CHLORIDE 2.56 MICROGRAM(S)/KG/HR: 4 INJECTION INTRAVENOUS at 21:51

## 2021-03-01 RX ADMIN — Medication 1 PATCH: at 11:37

## 2021-03-01 RX ADMIN — PROPOFOL 2.31 MICROGRAM(S)/KG/MIN: 10 INJECTION, EMULSION INTRAVENOUS at 05:51

## 2021-03-01 RX ADMIN — Medication 40 MILLIGRAM(S): at 05:51

## 2021-03-01 RX ADMIN — Medication 40 MILLIGRAM(S): at 19:56

## 2021-03-01 RX ADMIN — FENTANYL CITRATE 3.86 MICROGRAM(S)/KG/HR: 50 INJECTION INTRAVENOUS at 08:45

## 2021-03-01 RX ADMIN — DEXMEDETOMIDINE HYDROCHLORIDE IN 0.9% SODIUM CHLORIDE 2.56 MICROGRAM(S)/KG/HR: 4 INJECTION INTRAVENOUS at 19:40

## 2021-03-01 RX ADMIN — Medication 1 PATCH: at 07:14

## 2021-03-01 RX ADMIN — SODIUM CHLORIDE 3 MILLILITER(S): 9 INJECTION INTRAMUSCULAR; INTRAVENOUS; SUBCUTANEOUS at 12:36

## 2021-03-01 RX ADMIN — ENOXAPARIN SODIUM 40 MILLIGRAM(S): 100 INJECTION SUBCUTANEOUS at 11:35

## 2021-03-01 RX ADMIN — Medication 4: at 00:18

## 2021-03-01 RX ADMIN — DEXMEDETOMIDINE HYDROCHLORIDE IN 0.9% SODIUM CHLORIDE 2.56 MICROGRAM(S)/KG/HR: 4 INJECTION INTRAVENOUS at 02:00

## 2021-03-01 RX ADMIN — Medication 3 MILLILITER(S): at 04:46

## 2021-03-01 RX ADMIN — CEFTRIAXONE 100 MILLIGRAM(S): 500 INJECTION, POWDER, FOR SOLUTION INTRAMUSCULAR; INTRAVENOUS at 02:02

## 2021-03-01 RX ADMIN — SODIUM CHLORIDE 3 MILLILITER(S): 9 INJECTION INTRAMUSCULAR; INTRAVENOUS; SUBCUTANEOUS at 00:42

## 2021-03-01 RX ADMIN — Medication 40 MILLIGRAM(S): at 17:27

## 2021-03-01 RX ADMIN — Medication 3 MILLILITER(S): at 17:22

## 2021-03-01 RX ADMIN — DEXMEDETOMIDINE HYDROCHLORIDE IN 0.9% SODIUM CHLORIDE 2.56 MICROGRAM(S)/KG/HR: 4 INJECTION INTRAVENOUS at 09:56

## 2021-03-01 RX ADMIN — Medication 4: at 11:36

## 2021-03-01 RX ADMIN — PANTOPRAZOLE SODIUM 40 MILLIGRAM(S): 20 TABLET, DELAYED RELEASE ORAL at 11:35

## 2021-03-01 RX ADMIN — DEXMEDETOMIDINE HYDROCHLORIDE IN 0.9% SODIUM CHLORIDE 2.56 MICROGRAM(S)/KG/HR: 4 INJECTION INTRAVENOUS at 16:57

## 2021-03-01 RX ADMIN — SODIUM CHLORIDE 3 MILLILITER(S): 9 INJECTION INTRAMUSCULAR; INTRAVENOUS; SUBCUTANEOUS at 04:46

## 2021-03-01 RX ADMIN — INSULIN GLARGINE 7 UNIT(S): 100 INJECTION, SOLUTION SUBCUTANEOUS at 21:31

## 2021-03-01 RX ADMIN — CHLORHEXIDINE GLUCONATE 1 APPLICATION(S): 213 SOLUTION TOPICAL at 05:52

## 2021-03-01 RX ADMIN — Medication 3 MILLILITER(S): at 12:36

## 2021-03-01 RX ADMIN — SODIUM CHLORIDE 3 MILLILITER(S): 9 INJECTION INTRAMUSCULAR; INTRAVENOUS; SUBCUTANEOUS at 17:22

## 2021-03-01 NOTE — CHART NOTE - NSCHARTNOTEFT_GEN_A_CORE
Pt seen and examined with ICU team 3/1.      24 hr events:  weaned and extubated this afternoon  off levophed since yesterday  chest tube on water seal    ## ROS:  [x] unable to obtain due to intubation       ## Labs:  CBC:                        11.7   17.48 )-----------( 219      ( 01 Mar 2021 03:47 )             36.9     Chem:  03-01    144  |  106  |  31<H>  ----------------------------<  188<H>  4.1   |  32<H>  |  1.10    Ca    8.2<L>      01 Mar 2021 03:47  Phos  2.2     03-01  Mg     2.7     03-01    TPro  6.0  /  Alb  2.3<L>  /  TBili  0.3  /  DBili  x   /  AST  24  /  ALT  27  /  AlkPhos  51  03-01      ## Medications:  azithromycin  IVPB 500 milliGRAM(s) IV Intermittent every 24 hours  cefTRIAXone   IVPB 1000 milliGRAM(s) IV Intermittent every 24 hours      ALBUTerol    0.083% 2.5 milliGRAM(s) Nebulizer every 4 hours PRN  albuterol/ipratropium for Nebulization 3 milliLiter(s) Nebulizer every 6 hours  sodium chloride 3%  Inhalation 3 milliLiter(s) Inhalation every 6 hours    dextrose 40% Gel 15 Gram(s) Oral once  dextrose 50% Injectable 25 Gram(s) IV Push once  dextrose 50% Injectable 12.5 Gram(s) IV Push once  dextrose 50% Injectable 25 Gram(s) IV Push once  glucagon  Injectable 1 milliGRAM(s) IntraMuscular once  insulin glargine Injectable (LANTUS) 7 Unit(s) SubCutaneous at bedtime  insulin lispro (ADMELOG) corrective regimen sliding scale   SubCutaneous every 6 hours    enoxaparin Injectable 40 milliGRAM(s) SubCutaneous daily    pantoprazole  Injectable 40 milliGRAM(s) IV Push daily  polyethylene glycol 3350 17 Gram(s) Oral daily  senna 2 Tablet(s) Oral at bedtime    dexMEDEtomidine Infusion 0.2 MICROgram(s)/kG/Hr IV Continuous <Continuous>  fentaNYL   Infusion. 0.5 MICROgram(s)/kG/Hr IV Continuous <Continuous>  propofol Infusion 5 MICROgram(s)/kG/Min IV Continuous <Continuous>      ## Vitals:  T(C): 37.7 (03-01-21 @ 19:21), Max: 37.7 (03-01-21 @ 11:00)  HR: 63 (03-01-21 @ 21:03) (58 - 123)  BP: 153/89 (03-01-21 @ 21:03) (124/70 - 197/113)  BP(mean): 107 (03-01-21 @ 21:03) (86 - 134)  RR: 22 (03-01-21 @ 21:03) (15 - 30)  SpO2: 94% (03-01-21 @ 21:03) (87% - 98%)    Vent: Mode: CPAP with PS, RR (patient): 28, FiO2: 40, PEEP: 5, PS: 5  ABG: ABG - ( 01 Mar 2021 08:39 )  pH, Arterial: x     pH, Blood: 7.37  /  pCO2: 58    /  pO2: 79    / HCO3: 33    / Base Excess: 6.5   /  SaO2: 94          02-28 @ 07:01  -  03-01 @ 07:00  --------------------------------------------------------  IN: 2481.2 mL / OUT: 3225 mL / NET: -743.8 mL    03-01 @ 07:01  -  03-01 @ 22:01  --------------------------------------------------------  IN: 624.3 mL / OUT: 1305 mL / NET: -680.7 mL        ## P/E:  Gen: lying comfortably in bed in no apparent distress  HEENT: PERRL, EOMI  Resp: CTA B/L  CVS: RRR  Abd: soft NT/ND +BS  Ext: no c/c/e  Neuro: awake, responsive, following commands      CODE STATUS: [x] full code  [ ] DNR  [ ] [facility]NI  [ ] [facility]  Goals of care discussion: [x] yes         55M PMH HTN, DM, asthma presents for generalized weakness, SOB, productive cough and chest congestion. Found to be hypoxic to the 80s, placed on NRB. Pt became agitated, more hypoxic, placed on biPAP. intubated for acute hypoxic and acute on chronic hypercapnic respiratory failure. Post intubation pt with large R PTX s/p need for emergent R chest tube placement for decompression. Shock state post intubation requiring vasopressor support. On antibx treating for empiric PNA.     DX: acute hypoxic respiratoyr failure, acute on chronic hypercarbic respiratory failure, R PTX s/p chest tube placement, PNA covering gram negative organisms    - weaned and extubated today  - doing well on nasal cannula  - chest tube remains in place, on water seal since yesterday, will keep on water seal  - cont azithromycin and ceftriaxone covering CAP  - BP stable off pressors, will monitor hemodynamics  - d/c TLC  - monitor respiratory status post extubation    Critical care time: 40 min

## 2021-03-02 LAB
ALBUMIN SERPL ELPH-MCNC: 2.5 G/DL — LOW (ref 3.3–5)
ALP SERPL-CCNC: 45 U/L — SIGNIFICANT CHANGE UP (ref 40–120)
ALT FLD-CCNC: 31 U/L — SIGNIFICANT CHANGE UP (ref 12–78)
ANION GAP SERPL CALC-SCNC: 5 MMOL/L — SIGNIFICANT CHANGE UP (ref 5–17)
AST SERPL-CCNC: 17 U/L — SIGNIFICANT CHANGE UP (ref 15–37)
BASE EXCESS BLDA CALC-SCNC: 13 MMOL/L — HIGH (ref -2–2)
BASOPHILS # BLD AUTO: 0.02 K/UL — SIGNIFICANT CHANGE UP (ref 0–0.2)
BASOPHILS NFR BLD AUTO: 0.1 % — SIGNIFICANT CHANGE UP (ref 0–2)
BILIRUB SERPL-MCNC: 0.4 MG/DL — SIGNIFICANT CHANGE UP (ref 0.2–1.2)
BLOOD GAS COMMENTS: SIGNIFICANT CHANGE UP
BLOOD GAS COMMENTS: SIGNIFICANT CHANGE UP
BLOOD GAS SOURCE: SIGNIFICANT CHANGE UP
BUN SERPL-MCNC: 32 MG/DL — HIGH (ref 7–23)
CALCIUM SERPL-MCNC: 8.3 MG/DL — LOW (ref 8.5–10.1)
CHLORIDE SERPL-SCNC: 104 MMOL/L — SIGNIFICANT CHANGE UP (ref 96–108)
CO2 SERPL-SCNC: 36 MMOL/L — HIGH (ref 22–31)
CREAT SERPL-MCNC: 0.92 MG/DL — SIGNIFICANT CHANGE UP (ref 0.5–1.3)
EOSINOPHIL # BLD AUTO: 0.01 K/UL — SIGNIFICANT CHANGE UP (ref 0–0.5)
EOSINOPHIL NFR BLD AUTO: 0.1 % — SIGNIFICANT CHANGE UP (ref 0–6)
GLUCOSE BLDC GLUCOMTR-MCNC: 111 MG/DL — HIGH (ref 70–99)
GLUCOSE BLDC GLUCOMTR-MCNC: 111 MG/DL — HIGH (ref 70–99)
GLUCOSE BLDC GLUCOMTR-MCNC: 49 MG/DL — CRITICAL LOW (ref 70–99)
GLUCOSE BLDC GLUCOMTR-MCNC: 56 MG/DL — LOW (ref 70–99)
GLUCOSE BLDC GLUCOMTR-MCNC: 72 MG/DL — SIGNIFICANT CHANGE UP (ref 70–99)
GLUCOSE BLDC GLUCOMTR-MCNC: 87 MG/DL — SIGNIFICANT CHANGE UP (ref 70–99)
GLUCOSE SERPL-MCNC: 112 MG/DL — HIGH (ref 70–99)
HCO3 BLDA-SCNC: 40 MMOL/L — HIGH (ref 21–29)
HCT VFR BLD CALC: 34.8 % — LOW (ref 39–50)
HGB BLD-MCNC: 11.2 G/DL — LOW (ref 13–17)
HOROWITZ INDEX BLDA+IHG-RTO: 28 — SIGNIFICANT CHANGE UP
IMM GRANULOCYTES NFR BLD AUTO: 0.5 % — SIGNIFICANT CHANGE UP (ref 0–1.5)
LYMPHOCYTES # BLD AUTO: 1.43 K/UL — SIGNIFICANT CHANGE UP (ref 1–3.3)
LYMPHOCYTES # BLD AUTO: 9.4 % — LOW (ref 13–44)
MAGNESIUM SERPL-MCNC: 2.6 MG/DL — SIGNIFICANT CHANGE UP (ref 1.6–2.6)
MCHC RBC-ENTMCNC: 28.6 PG — SIGNIFICANT CHANGE UP (ref 27–34)
MCHC RBC-ENTMCNC: 32.2 GM/DL — SIGNIFICANT CHANGE UP (ref 32–36)
MCV RBC AUTO: 89 FL — SIGNIFICANT CHANGE UP (ref 80–100)
MONOCYTES # BLD AUTO: 1.68 K/UL — HIGH (ref 0–0.9)
MONOCYTES NFR BLD AUTO: 11 % — SIGNIFICANT CHANGE UP (ref 2–14)
NEUTROPHILS # BLD AUTO: 12.04 K/UL — HIGH (ref 1.8–7.4)
NEUTROPHILS NFR BLD AUTO: 78.9 % — HIGH (ref 43–77)
NRBC # BLD: 0 /100 WBCS — SIGNIFICANT CHANGE UP (ref 0–0)
PCO2 BLDA: 64 MMHG — HIGH (ref 32–46)
PH BLD: 7.41 — SIGNIFICANT CHANGE UP (ref 7.35–7.45)
PHOSPHATE SERPL-MCNC: 3.5 MG/DL — SIGNIFICANT CHANGE UP (ref 2.5–4.5)
PLATELET # BLD AUTO: 188 K/UL — SIGNIFICANT CHANGE UP (ref 150–400)
PO2 BLDA: 59 MMHG — LOW (ref 74–108)
POTASSIUM SERPL-MCNC: 3.7 MMOL/L — SIGNIFICANT CHANGE UP (ref 3.5–5.3)
POTASSIUM SERPL-SCNC: 3.7 MMOL/L — SIGNIFICANT CHANGE UP (ref 3.5–5.3)
PROT SERPL-MCNC: 5.9 GM/DL — LOW (ref 6–8.3)
RBC # BLD: 3.91 M/UL — LOW (ref 4.2–5.8)
RBC # FLD: 16.5 % — HIGH (ref 10.3–14.5)
SAO2 % BLDA: 89 % — LOW (ref 92–96)
SODIUM SERPL-SCNC: 145 MMOL/L — SIGNIFICANT CHANGE UP (ref 135–145)
WBC # BLD: 15.25 K/UL — HIGH (ref 3.8–10.5)
WBC # FLD AUTO: 15.25 K/UL — HIGH (ref 3.8–10.5)

## 2021-03-02 PROCEDURE — 71045 X-RAY EXAM CHEST 1 VIEW: CPT | Mod: 26

## 2021-03-02 PROCEDURE — 99291 CRITICAL CARE FIRST HOUR: CPT

## 2021-03-02 PROCEDURE — 93010 ELECTROCARDIOGRAM REPORT: CPT

## 2021-03-02 RX ORDER — DEXTROSE 50 % IN WATER 50 %
25 SYRINGE (ML) INTRAVENOUS ONCE
Refills: 0 | Status: COMPLETED | OUTPATIENT
Start: 2021-03-02 | End: 2021-03-02

## 2021-03-02 RX ORDER — OLANZAPINE 15 MG/1
5 TABLET, FILM COATED ORAL ONCE
Refills: 0 | Status: DISCONTINUED | OUTPATIENT
Start: 2021-03-02 | End: 2021-03-03

## 2021-03-02 RX ORDER — NICOTINE POLACRILEX 2 MG
1 GUM BUCCAL DAILY
Refills: 0 | Status: DISCONTINUED | OUTPATIENT
Start: 2021-03-02 | End: 2021-03-10

## 2021-03-02 RX ORDER — IPRATROPIUM/ALBUTEROL SULFATE 18-103MCG
3 AEROSOL WITH ADAPTER (GRAM) INHALATION ONCE
Refills: 0 | Status: COMPLETED | OUTPATIENT
Start: 2021-03-02 | End: 2021-03-02

## 2021-03-02 RX ORDER — SODIUM CHLORIDE 9 MG/ML
1000 INJECTION, SOLUTION INTRAVENOUS
Refills: 0 | Status: COMPLETED | OUTPATIENT
Start: 2021-03-02 | End: 2021-03-02

## 2021-03-02 RX ADMIN — PANTOPRAZOLE SODIUM 40 MILLIGRAM(S): 20 TABLET, DELAYED RELEASE ORAL at 11:21

## 2021-03-02 RX ADMIN — Medication 3 MILLILITER(S): at 23:01

## 2021-03-02 RX ADMIN — AZITHROMYCIN 255 MILLIGRAM(S): 500 TABLET, FILM COATED ORAL at 00:02

## 2021-03-02 RX ADMIN — Medication 3 MILLILITER(S): at 13:15

## 2021-03-02 RX ADMIN — DEXMEDETOMIDINE HYDROCHLORIDE IN 0.9% SODIUM CHLORIDE 2.56 MICROGRAM(S)/KG/HR: 4 INJECTION INTRAVENOUS at 02:30

## 2021-03-02 RX ADMIN — Medication 25 GRAM(S): at 11:46

## 2021-03-02 RX ADMIN — SODIUM CHLORIDE 3 MILLILITER(S): 9 INJECTION INTRAMUSCULAR; INTRAVENOUS; SUBCUTANEOUS at 23:01

## 2021-03-02 RX ADMIN — Medication 40 MILLIGRAM(S): at 07:03

## 2021-03-02 RX ADMIN — DEXMEDETOMIDINE HYDROCHLORIDE IN 0.9% SODIUM CHLORIDE 2.56 MICROGRAM(S)/KG/HR: 4 INJECTION INTRAVENOUS at 20:39

## 2021-03-02 RX ADMIN — Medication 3 MILLILITER(S): at 05:30

## 2021-03-02 RX ADMIN — DEXMEDETOMIDINE HYDROCHLORIDE IN 0.9% SODIUM CHLORIDE 2.56 MICROGRAM(S)/KG/HR: 4 INJECTION INTRAVENOUS at 11:22

## 2021-03-02 RX ADMIN — SODIUM CHLORIDE 3 MILLILITER(S): 9 INJECTION INTRAMUSCULAR; INTRAVENOUS; SUBCUTANEOUS at 05:30

## 2021-03-02 RX ADMIN — DEXMEDETOMIDINE HYDROCHLORIDE IN 0.9% SODIUM CHLORIDE 2.56 MICROGRAM(S)/KG/HR: 4 INJECTION INTRAVENOUS at 08:34

## 2021-03-02 RX ADMIN — CEFTRIAXONE 100 MILLIGRAM(S): 500 INJECTION, POWDER, FOR SOLUTION INTRAMUSCULAR; INTRAVENOUS at 02:30

## 2021-03-02 RX ADMIN — Medication 1 PATCH: at 11:25

## 2021-03-02 RX ADMIN — Medication 25 GRAM(S): at 07:03

## 2021-03-02 RX ADMIN — SODIUM CHLORIDE 75 MILLILITER(S): 9 INJECTION, SOLUTION INTRAVENOUS at 11:46

## 2021-03-02 RX ADMIN — SODIUM CHLORIDE 3 MILLILITER(S): 9 INJECTION INTRAMUSCULAR; INTRAVENOUS; SUBCUTANEOUS at 00:19

## 2021-03-02 RX ADMIN — DEXMEDETOMIDINE HYDROCHLORIDE IN 0.9% SODIUM CHLORIDE 2.56 MICROGRAM(S)/KG/HR: 4 INJECTION INTRAVENOUS at 00:02

## 2021-03-02 RX ADMIN — Medication 1 PATCH: at 20:43

## 2021-03-02 RX ADMIN — SODIUM CHLORIDE 3 MILLILITER(S): 9 INJECTION INTRAMUSCULAR; INTRAVENOUS; SUBCUTANEOUS at 13:13

## 2021-03-02 RX ADMIN — Medication 3 MILLILITER(S): at 00:18

## 2021-03-02 RX ADMIN — Medication 1 PATCH: at 07:32

## 2021-03-02 RX ADMIN — SODIUM CHLORIDE 3 MILLILITER(S): 9 INJECTION INTRAMUSCULAR; INTRAVENOUS; SUBCUTANEOUS at 16:49

## 2021-03-02 RX ADMIN — Medication 1 PATCH: at 11:22

## 2021-03-02 RX ADMIN — DEXMEDETOMIDINE HYDROCHLORIDE IN 0.9% SODIUM CHLORIDE 2.56 MICROGRAM(S)/KG/HR: 4 INJECTION INTRAVENOUS at 15:17

## 2021-03-02 RX ADMIN — CHLORHEXIDINE GLUCONATE 1 APPLICATION(S): 213 SOLUTION TOPICAL at 06:50

## 2021-03-02 RX ADMIN — ENOXAPARIN SODIUM 40 MILLIGRAM(S): 100 INJECTION SUBCUTANEOUS at 11:22

## 2021-03-02 RX ADMIN — Medication 3 MILLILITER(S): at 16:49

## 2021-03-02 NOTE — PHYSICAL THERAPY INITIAL EVALUATION ADULT - PERTINENT HX OF CURRENT PROBLEM, REHAB EVAL
Patient admitted with generalized weakness, SOB and chest congestion with green sputum. Patient placed on BiPap initially but required intubation. Patient found to have pneumothorax and required chest tube placement. Patient extubated on 3/1 and chest tube removed.

## 2021-03-02 NOTE — CHART NOTE - NSCHARTNOTEFT_GEN_A_CORE
Pt seen and examined on rounds with ICU team 3/2    24 hr events:  extubated yesterday  chest tube clamped this morning, no PTX, chest tube d/angela this afternoon  agitated overnight, placed on precedex drip  weaning down precedex drip today    ## ROS:  [x ] limited due to sedation  no CP, no SOB, no cough  no abdominal pain      ## Labs:  CBC:                        11.2   15.25 )-----------( 188      ( 02 Mar 2021 04:30 )             34.8     Chem:  03-02    145  |  104  |  32<H>  ----------------------------<  112<H>  3.7   |  36<H>  |  0.92    Ca    8.3<L>      02 Mar 2021 04:30  Phos  3.5     03-02  Mg     2.6     03-02    TPro  5.9<L>  /  Alb  2.5<L>  /  TBili  0.4  /  DBili  x   /  AST  17  /  ALT  31  /  AlkPhos  45  03-02      ## Imaging:  CXR < from: Xray Chest 1 View- PORTABLE-Urgent (Xray Chest 1 View- PORTABLE-Urgent .) (03.02.21 @ 16:42) >  Frontal expiratory view of the chest shows the heart to be similar in size. The lungs show partial clearing of the left base with small right effusion and there is no evidence of pneumothorax.      ## Medications:  cefTRIAXone   IVPB 1000 milliGRAM(s) IV Intermittent every 24 hours      ALBUTerol    0.083% 2.5 milliGRAM(s) Nebulizer every 4 hours PRN  albuterol/ipratropium for Nebulization 3 milliLiter(s) Nebulizer every 6 hours  sodium chloride 3%  Inhalation 3 milliLiter(s) Inhalation every 6 hours    dextrose 40% Gel 15 Gram(s) Oral once  dextrose 50% Injectable 25 Gram(s) IV Push once  dextrose 50% Injectable 12.5 Gram(s) IV Push once  dextrose 50% Injectable 25 Gram(s) IV Push once  glucagon  Injectable 1 milliGRAM(s) IntraMuscular once  insulin lispro (ADMELOG) corrective regimen sliding scale   SubCutaneous every 6 hours    enoxaparin Injectable 40 milliGRAM(s) SubCutaneous daily    pantoprazole  Injectable 40 milliGRAM(s) IV Push daily  polyethylene glycol 3350 17 Gram(s) Oral daily  senna 2 Tablet(s) Oral at bedtime    dexMEDEtomidine Infusion 0.2 MICROgram(s)/kG/Hr IV Continuous <Continuous>      ## Vitals:  T(C): 37.9 (03-02-21 @ 16:20), Max: 37.9 (03-02-21 @ 16:20)  HR: 65 (03-02-21 @ 20:00) (46 - 90)  BP: 132/78 (03-02-21 @ 19:00) (131/73 - 186/121)  BP(mean): 93 (03-02-21 @ 19:00) (89 - 140)  RR: 28 (03-02-21 @ 20:00) (13 - 31)  SpO2: 93% (03-02-21 @ 20:00) (88% - 100%)    ABG: ABG - ( 01 Mar 2021 08:39 )  pH, Arterial: pH, Blood: 7.37  /  pCO2: 58    /  pO2: 79    / HCO3: 33    / Base Excess: 6.5   /  SaO2: 94          03-01 @ 07:01  -  03-02 @ 07:00  --------------------------------------------------------  IN: 1112.1 mL / OUT: 2805 mL / NET: -1692.9 mL    03-02 @ 07:01  -  03-02 @ 20:14  --------------------------------------------------------  IN: 732.8 mL / OUT: 300 mL / NET: 432.8 mL          ## P/E:  Gen: lying comfortably in bed in no apparent distress  HEENT: PERRL, EOMI  Resp: CTA B/L , chest tube removed  CVS: RRR  Abd: soft NT/ND +BS  Ext: no c/c/e  Neuro: follows simple commands      CODE STATUS: [x ] full code  [ ] DNR  [ ] DNI  [ ] [facility]  Goals of care discussion: [ ] yes       55M PMH HTN, DM, asthma presents for generalized weakness, SOB, productive cough and chest congestion. Found to be hypoxic to the 80s, placed on NRB. Pt became agitated, more hypoxic, placed on biPAP. intubated for acute hypoxic and acute on chronic hypercapnic respiratory failure. Post intubation pt with large R PTX s/p need for emergent R chest tube placement for decompression. Shock state post intubation requiring vasopressor support. On antibx treating for empiric PNA. Extubated 3/1. Agitated overnight post extubation - placed on precedex drip.     DX: acute hypoxic respiratory failure, acute on chronic hypercarbic respiratory failure, R PTX s/p chest tube placement, PNA covering gram negative organisms    - extubated to nasal cannula yesterday, doing well on 4L NC  - chest tubed clamped this AM, no PTX  - chest tube d/angela, post CT removal CXR without PTX, lung remains inflated  - will decreased solumedrol from 40mg to 20mg  - d/c azithromycine, cont with ceftriaxone  - BP stable off pressors, will monitor hemodynamics  - wean down precedex as tolerates  - KATINA improving, making urine      Critical care time: 40 min.

## 2021-03-02 NOTE — PHYSICAL THERAPY INITIAL EVALUATION ADULT - BALANCE TRAINING, PT EVAL
Patient will improve static and dynamic standing balance by 1 grade with rolling walker in 3-4 weeks to improve safety and decrease risk of falls.

## 2021-03-02 NOTE — PHYSICAL THERAPY INITIAL EVALUATION ADULT - FUNCTIONAL LIMITATIONS, PT EVAL
Freddy Plaza. is a 44 y.o. male in today for follow-up on referral to Neuropsychology. Learning assessment previously completed 1018/2019; primary language is [place]. 1. Have you been to the ER, urgent care clinic since your last visit? Hospitalized since your last visit? No    2. Have you seen or consulted any other health care providers outside of the [address] [place] [address] since your last visit? Include any pap smears or colon screening.  No self-care/home management

## 2021-03-02 NOTE — PHYSICAL THERAPY INITIAL EVALUATION ADULT - BED MOBILITY TRAINING, PT EVAL
Pt will independently perform all aspects of bed mobility to help prevent pressure ulcers, by 5-6 weeks.

## 2021-03-02 NOTE — PHYSICAL THERAPY INITIAL EVALUATION ADULT - ADDITIONAL COMMENTS
As per patient's godmother, Jesica Villeda (HCP) at [phone], patient lives in an apartment with +ramp access, and then 1 flight of stairs once inside (denies elevator). She reports patient  owns straight cane and rolling walker, which he uses for ambulation (outdoors-cane, indoors- rolling walker). Patient has a home health aide 5 days x 2 hours who assists with laundry, cleaning and food shopping. He is partially blind.

## 2021-03-03 LAB
ALBUMIN SERPL ELPH-MCNC: 2.5 G/DL — LOW (ref 3.3–5)
ALP SERPL-CCNC: 49 U/L — SIGNIFICANT CHANGE UP (ref 40–120)
ALT FLD-CCNC: 26 U/L — SIGNIFICANT CHANGE UP (ref 12–78)
ANION GAP SERPL CALC-SCNC: 5 MMOL/L — SIGNIFICANT CHANGE UP (ref 5–17)
AST SERPL-CCNC: 23 U/L — SIGNIFICANT CHANGE UP (ref 15–37)
BASOPHILS # BLD AUTO: 0.01 K/UL — SIGNIFICANT CHANGE UP (ref 0–0.2)
BASOPHILS NFR BLD AUTO: 0.1 % — SIGNIFICANT CHANGE UP (ref 0–2)
BILIRUB SERPL-MCNC: 0.6 MG/DL — SIGNIFICANT CHANGE UP (ref 0.2–1.2)
BUN SERPL-MCNC: 28 MG/DL — HIGH (ref 7–23)
CALCIUM SERPL-MCNC: 8 MG/DL — LOW (ref 8.5–10.1)
CHLORIDE SERPL-SCNC: 104 MMOL/L — SIGNIFICANT CHANGE UP (ref 96–108)
CK SERPL-CCNC: 243 U/L — SIGNIFICANT CHANGE UP (ref 26–308)
CO2 SERPL-SCNC: 37 MMOL/L — HIGH (ref 22–31)
CREAT SERPL-MCNC: 0.81 MG/DL — SIGNIFICANT CHANGE UP (ref 0.5–1.3)
CULTURE RESULTS: SIGNIFICANT CHANGE UP
CULTURE RESULTS: SIGNIFICANT CHANGE UP
EOSINOPHIL # BLD AUTO: 0.14 K/UL — SIGNIFICANT CHANGE UP (ref 0–0.5)
EOSINOPHIL NFR BLD AUTO: 1.1 % — SIGNIFICANT CHANGE UP (ref 0–6)
GLUCOSE BLDC GLUCOMTR-MCNC: 125 MG/DL — HIGH (ref 70–99)
GLUCOSE BLDC GLUCOMTR-MCNC: 142 MG/DL — HIGH (ref 70–99)
GLUCOSE BLDC GLUCOMTR-MCNC: 183 MG/DL — HIGH (ref 70–99)
GLUCOSE SERPL-MCNC: 125 MG/DL — HIGH (ref 70–99)
HCT VFR BLD CALC: 34.3 % — LOW (ref 39–50)
HGB BLD-MCNC: 11 G/DL — LOW (ref 13–17)
IMM GRANULOCYTES NFR BLD AUTO: 0.3 % — SIGNIFICANT CHANGE UP (ref 0–1.5)
LYMPHOCYTES # BLD AUTO: 1.46 K/UL — SIGNIFICANT CHANGE UP (ref 1–3.3)
LYMPHOCYTES # BLD AUTO: 11.8 % — LOW (ref 13–44)
MAGNESIUM SERPL-MCNC: 2 MG/DL — SIGNIFICANT CHANGE UP (ref 1.6–2.6)
MCHC RBC-ENTMCNC: 28.9 PG — SIGNIFICANT CHANGE UP (ref 27–34)
MCHC RBC-ENTMCNC: 32.1 GM/DL — SIGNIFICANT CHANGE UP (ref 32–36)
MCV RBC AUTO: 90 FL — SIGNIFICANT CHANGE UP (ref 80–100)
MONOCYTES # BLD AUTO: 2.07 K/UL — HIGH (ref 0–0.9)
MONOCYTES NFR BLD AUTO: 16.7 % — HIGH (ref 2–14)
NEUTROPHILS # BLD AUTO: 8.7 K/UL — HIGH (ref 1.8–7.4)
NEUTROPHILS NFR BLD AUTO: 70 % — SIGNIFICANT CHANGE UP (ref 43–77)
NRBC # BLD: 0 /100 WBCS — SIGNIFICANT CHANGE UP (ref 0–0)
PHOSPHATE SERPL-MCNC: 2.7 MG/DL — SIGNIFICANT CHANGE UP (ref 2.5–4.5)
PLATELET # BLD AUTO: 212 K/UL — SIGNIFICANT CHANGE UP (ref 150–400)
POTASSIUM SERPL-MCNC: 3.3 MMOL/L — LOW (ref 3.5–5.3)
POTASSIUM SERPL-SCNC: 3.3 MMOL/L — LOW (ref 3.5–5.3)
PROT SERPL-MCNC: 6 GM/DL — SIGNIFICANT CHANGE UP (ref 6–8.3)
RBC # BLD: 3.81 M/UL — LOW (ref 4.2–5.8)
RBC # FLD: 16 % — HIGH (ref 10.3–14.5)
SODIUM SERPL-SCNC: 146 MMOL/L — HIGH (ref 135–145)
SPECIMEN SOURCE: SIGNIFICANT CHANGE UP
SPECIMEN SOURCE: SIGNIFICANT CHANGE UP
TROPONIN I SERPL-MCNC: 0.02 NG/ML — SIGNIFICANT CHANGE UP (ref 0.01–0.04)
WBC # BLD: 12.42 K/UL — HIGH (ref 3.8–10.5)
WBC # FLD AUTO: 12.42 K/UL — HIGH (ref 3.8–10.5)

## 2021-03-03 PROCEDURE — 99291 CRITICAL CARE FIRST HOUR: CPT

## 2021-03-03 PROCEDURE — 71045 X-RAY EXAM CHEST 1 VIEW: CPT | Mod: 26

## 2021-03-03 RX ORDER — SODIUM CHLORIDE 9 MG/ML
1000 INJECTION, SOLUTION INTRAVENOUS
Refills: 0 | Status: DISCONTINUED | OUTPATIENT
Start: 2021-03-03 | End: 2021-03-03

## 2021-03-03 RX ORDER — POTASSIUM CHLORIDE 20 MEQ
10 PACKET (EA) ORAL
Refills: 0 | Status: COMPLETED | OUTPATIENT
Start: 2021-03-03 | End: 2021-03-03

## 2021-03-03 RX ORDER — OLANZAPINE 15 MG/1
5 TABLET, FILM COATED ORAL ONCE
Refills: 0 | Status: COMPLETED | OUTPATIENT
Start: 2021-03-03 | End: 2021-03-03

## 2021-03-03 RX ORDER — ACETAMINOPHEN 500 MG
1000 TABLET ORAL ONCE
Refills: 0 | Status: COMPLETED | OUTPATIENT
Start: 2021-03-03 | End: 2021-03-03

## 2021-03-03 RX ADMIN — Medication 1 PATCH: at 01:02

## 2021-03-03 RX ADMIN — Medication 3 MILLILITER(S): at 12:08

## 2021-03-03 RX ADMIN — Medication 1 PATCH: at 01:03

## 2021-03-03 RX ADMIN — CHLORHEXIDINE GLUCONATE 1 APPLICATION(S): 213 SOLUTION TOPICAL at 06:21

## 2021-03-03 RX ADMIN — SODIUM CHLORIDE 3 MILLILITER(S): 9 INJECTION INTRAMUSCULAR; INTRAVENOUS; SUBCUTANEOUS at 17:31

## 2021-03-03 RX ADMIN — DEXMEDETOMIDINE HYDROCHLORIDE IN 0.9% SODIUM CHLORIDE 2.56 MICROGRAM(S)/KG/HR: 4 INJECTION INTRAVENOUS at 03:52

## 2021-03-03 RX ADMIN — OLANZAPINE 5 MILLIGRAM(S): 15 TABLET, FILM COATED ORAL at 21:20

## 2021-03-03 RX ADMIN — Medication 3 MILLILITER(S): at 22:05

## 2021-03-03 RX ADMIN — SODIUM CHLORIDE 75 MILLILITER(S): 9 INJECTION, SOLUTION INTRAVENOUS at 03:40

## 2021-03-03 RX ADMIN — Medication 100 MILLIEQUIVALENT(S): at 02:31

## 2021-03-03 RX ADMIN — Medication 100 MILLIEQUIVALENT(S): at 04:52

## 2021-03-03 RX ADMIN — Medication 1000 MILLIGRAM(S): at 13:45

## 2021-03-03 RX ADMIN — ENOXAPARIN SODIUM 40 MILLIGRAM(S): 100 INJECTION SUBCUTANEOUS at 11:49

## 2021-03-03 RX ADMIN — CEFTRIAXONE 100 MILLIGRAM(S): 500 INJECTION, POWDER, FOR SOLUTION INTRAMUSCULAR; INTRAVENOUS at 02:30

## 2021-03-03 RX ADMIN — Medication 3 MILLILITER(S): at 17:31

## 2021-03-03 RX ADMIN — DEXMEDETOMIDINE HYDROCHLORIDE IN 0.9% SODIUM CHLORIDE 2.56 MICROGRAM(S)/KG/HR: 4 INJECTION INTRAVENOUS at 13:40

## 2021-03-03 RX ADMIN — SODIUM CHLORIDE 3 MILLILITER(S): 9 INJECTION INTRAMUSCULAR; INTRAVENOUS; SUBCUTANEOUS at 12:07

## 2021-03-03 RX ADMIN — Medication 20 MILLIGRAM(S): at 06:20

## 2021-03-03 RX ADMIN — ALBUTEROL 2.5 MILLIGRAM(S): 90 AEROSOL, METERED ORAL at 00:00

## 2021-03-03 RX ADMIN — PANTOPRAZOLE SODIUM 40 MILLIGRAM(S): 20 TABLET, DELAYED RELEASE ORAL at 11:49

## 2021-03-03 RX ADMIN — Medication 100 MILLIEQUIVALENT(S): at 03:51

## 2021-03-03 RX ADMIN — SODIUM CHLORIDE 3 MILLILITER(S): 9 INJECTION INTRAMUSCULAR; INTRAVENOUS; SUBCUTANEOUS at 06:30

## 2021-03-03 RX ADMIN — Medication 1 PATCH: at 13:21

## 2021-03-03 RX ADMIN — Medication 400 MILLIGRAM(S): at 13:21

## 2021-03-03 RX ADMIN — Medication 1 PATCH: at 19:29

## 2021-03-03 RX ADMIN — Medication 2: at 12:09

## 2021-03-03 RX ADMIN — SENNA PLUS 2 TABLET(S): 8.6 TABLET ORAL at 22:07

## 2021-03-03 RX ADMIN — DEXMEDETOMIDINE HYDROCHLORIDE IN 0.9% SODIUM CHLORIDE 2.56 MICROGRAM(S)/KG/HR: 4 INJECTION INTRAVENOUS at 01:02

## 2021-03-03 RX ADMIN — Medication 3 MILLILITER(S): at 05:50

## 2021-03-03 RX ADMIN — DEXMEDETOMIDINE HYDROCHLORIDE IN 0.9% SODIUM CHLORIDE 2.56 MICROGRAM(S)/KG/HR: 4 INJECTION INTRAVENOUS at 20:31

## 2021-03-03 RX ADMIN — SODIUM CHLORIDE 3 MILLILITER(S): 9 INJECTION INTRAMUSCULAR; INTRAVENOUS; SUBCUTANEOUS at 22:06

## 2021-03-03 RX ADMIN — Medication 1 PATCH: at 09:20

## 2021-03-03 NOTE — SWALLOW BEDSIDE ASSESSMENT ADULT - SLP GENERAL OBSERVATIONS
Pt seen bedside, confused A&Ox1-2 (grossly to time) and unable to follow commands for oral [facility] exam. Pt on 1:1 observation for agitation; however, pt calm during evaluation. Pt answered biographical and orientation questions with fair accuracy; confusion noted with pt stated he had been eating food, when pt is currently NPO & reaching for objects that were not there. Pt with harsh vocal quality and low vocal volume, impacting speech intelligibility.

## 2021-03-03 NOTE — SWALLOW BEDSIDE ASSESSMENT ADULT - SWALLOW EVAL: DIAGNOSIS
Pt presented with oropharyngeal dysphagia marked by reduced cognition, decreased AP transport, delayed oral transit time, suspected delay in pharyngeal swallow trigger with multiple swallows noted across puree solids and honey thick liquids. Pt with reflexive cough s/p nectar thick liquid trials via tsp. No overt s/s of pen/asp across puree solid/honey thick liquid trials.

## 2021-03-03 NOTE — SWALLOW BEDSIDE ASSESSMENT ADULT - SWALLOW EVAL: RECOMMENDED FEEDING/EATING TECHNIQUES
allow for swallow between intakes/maintain upright posture during/after eating for 30 mins/oral hygiene

## 2021-03-03 NOTE — SWALLOW BEDSIDE ASSESSMENT ADULT - H & P REVIEW
55 year old man with a past medical history of asthma, DM and HTN. Pt was bought in by EMS with complaints of generalized weakness, SOB and chest congestion for 2 days. Patient also had a cough which was productive of green sputum. In the ED, patient was hypoxic and placed on non rebreather mask, then became agitated, took off the mask and monitor leads then desaturated to 80 %. Patient got ativan. Patient was placed on BIPAP machine. An ABG done showed hypoxia and hypercarbia. BIPAP settings were adjusted with no improvement. After discussion w ED physician, pt will be intubated, go for CT and then admitted to the ICU for further management. Intubated 2/26, extubated 3/1./yes

## 2021-03-03 NOTE — SWALLOW BEDSIDE ASSESSMENT ADULT - PHARYNGEAL PHASE
x4/Delayed pharyngeal swallow/Multiple swallows x2/Delayed pharyngeal swallow/Multiple swallows Delayed pharyngeal swallow/Cough post oral intake

## 2021-03-03 NOTE — SWALLOW BEDSIDE ASSESSMENT ADULT - COMMENTS
CXR 3/3/21: There is a mild right base pleural pulmonary reaction showing improvement from March 2.  Infiltrate off left heart border on March 2 also is improved. No visible pneumothorax.  IMPRESSION: Improved basilar lung findings. No visible pneumothorax.

## 2021-03-03 NOTE — CHART NOTE - NSCHARTNOTEFT_GEN_A_CORE
Pt seen and examined on rounds with ICU team 3/3    24 hr events:  extubated 2 days ago 3/1  chest tube d/angela 1 day ago 3/2  overnight with delirium, confusion/agitation: precedex drip increased to 1.5mcg  had hypoxic event O2 sat int he 80s overnight during episode of agitation placed on a ventimask  off lantus since yesterday due to hypoglycemia and has been on D5 gtt, blood sugar improved and D5 d/angela today    ## ROS:  [x ] unable to obtain (pt on sedation)      ## Labs:  CBC:                        11.0   12.42 )-----------( 212      ( 03 Mar 2021 01:12 )             34.3     Chem:  03-03    146<H>  |  104  |  28<H>  ----------------------------<  125<H>  3.3<L>   |  37<H>  |  0.81    Ca    8.0<L>      03 Mar 2021 01:12  Phos  2.7     03-03  Mg     2.0     03-03    TPro  6.0  /  Alb  2.5<L>  /  TBili  0.6  /  DBili  x   /  AST  23  /  ALT  26  /  AlkPhos  49  03-03        ## Medications:  cefTRIAXone   IVPB 1000 milliGRAM(s) IV Intermittent every 24 hours    ALBUTerol    0.083% 2.5 milliGRAM(s) Nebulizer every 4 hours PRN  albuterol/ipratropium for Nebulization 3 milliLiter(s) Nebulizer every 6 hours  sodium chloride 3%  Inhalation 3 milliLiter(s) Inhalation every 6 hours    dextrose 40% Gel 15 Gram(s) Oral once  dextrose 50% Injectable 25 Gram(s) IV Push once  dextrose 50% Injectable 12.5 Gram(s) IV Push once  dextrose 50% Injectable 25 Gram(s) IV Push once  glucagon  Injectable 1 milliGRAM(s) IntraMuscular once  insulin lispro (ADMELOG) corrective regimen sliding scale   SubCutaneous every 6 hours  methylPREDNISolone sodium succinate Injectable 20 milliGRAM(s) IV Push daily    enoxaparin Injectable 40 milliGRAM(s) SubCutaneous daily    pantoprazole  Injectable 40 milliGRAM(s) IV Push daily  polyethylene glycol 3350 17 Gram(s) Oral daily  senna 2 Tablet(s) Oral at bedtime    dexMEDEtomidine Infusion 0.2 MICROgram(s)/kG/Hr IV Continuous <Continuous>      ## Vitals:  T(C): 37.2 (03-03-21 @ 19:18), Max: 38.3 (03-03-21 @ 12:00)  HR: 59 (03-03-21 @ 21:15) (48 - 91)  BP: 150/92 (03-03-21 @ 21:00) (137/75 - 170/89)  BP(mean): 109 (03-03-21 @ 21:00) (90 - 114)  RR: 24 (03-03-21 @ 21:15) (18 - 34)  SpO2: 100% (03-03-21 @ 21:15) (86% - 100%)    ABG: ABG - ( 02 Mar 2021 22:42 )  pH, Arterial: x     pH, Blood: 7.41  /  pCO2: 64    /  pO2: 59    / HCO3: 40    / Base Excess: 13.0  /  SaO2: 89            03-02 @ 07:01  -  03-03 @ 07:00  --------------------------------------------------------  IN: 1779.1 mL / OUT: 1056 mL / NET: 723.1 mL    03-03 @ 07:01  -  03-03 @ 21:55  --------------------------------------------------------  IN: 597.4 mL / OUT: 831 mL / NET: -233.6 mL          ## P/E:  Gen: lying comfortably in bed in no apparent distress, sedated  HEENT: PERRL  Resp: CTA B/L  CVS: RRR  Abd: soft NT/ND +BS  Ext: no c/c/e  Neuro: sedated        CODE STATUS: [x ] full code  [ ] DNR  [ ] DNI  [ ] MOLST  Goals of care discussion: [ ] yes       55M PMH HTN, DM, asthma/COPD presents for generalized weakness, SOB, productive cough and chest congestion. Found to be hypoxic to the 80s, placed on NRB. Pt became agitated, more hypoxic, placed on biPAP. intubated for acute hypoxic and acute on chronic hypercapnic respiratory failure. Post intubation pt with large R PTX s/p need for emergent R chest tube placement for decompression. Shock state post intubation requiring vasopressor support. On antibx treating for empiric PNA. Extubated 3/1. Agitated post extubation due to delirium - placed on precedex drip.     DX: acute hypoxic respiratory failure, acute on chronic hypercarbic respiratory failure, R PTX s/p chest tube placement, PNA covering gram negative organisms, delirium    - remains extubated x2 days  - chest tubed d/angela yesterday  - had hypoxic episode with O2 desaturation during period of agitation requiring Ventimask oxygen supplementation   - wean down FIO2 as tolerates  - solumedrol decreased from 40mg to 20mg yesterday for underling COPD  - off azithromycin, remains on ceftriaxone for CAP  - BP stable off pressors, will cont to monitor hemodynamics  - cont to wean down precedex as tolerates, dose was increased overnight due to agitation   - KATINA improving, making urine  - febrile, will check blood cx  - hypoglycemia: lantus d/angela, can d/c D5 gtt as blood sugar improved  - passed swallow eval: initiate po intake      Critical care time: 35 min.

## 2021-03-04 LAB
ANION GAP SERPL CALC-SCNC: 4 MMOL/L — LOW (ref 5–17)
APPEARANCE UR: CLEAR — SIGNIFICANT CHANGE UP
BACTERIA # UR AUTO: NEGATIVE — SIGNIFICANT CHANGE UP
BASE EXCESS BLDA CALC-SCNC: 9.4 MMOL/L — HIGH (ref -2–2)
BASOPHILS # BLD AUTO: 0.02 K/UL — SIGNIFICANT CHANGE UP (ref 0–0.2)
BASOPHILS NFR BLD AUTO: 0.2 % — SIGNIFICANT CHANGE UP (ref 0–2)
BILIRUB UR-MCNC: NEGATIVE — SIGNIFICANT CHANGE UP
BLOOD GAS COMMENTS: SIGNIFICANT CHANGE UP
BLOOD GAS COMMENTS: SIGNIFICANT CHANGE UP
BLOOD GAS SOURCE: SIGNIFICANT CHANGE UP
BUN SERPL-MCNC: 19 MG/DL — SIGNIFICANT CHANGE UP (ref 7–23)
CALCIUM SERPL-MCNC: 8.4 MG/DL — LOW (ref 8.5–10.1)
CHLORIDE SERPL-SCNC: 99 MMOL/L — SIGNIFICANT CHANGE UP (ref 96–108)
CO2 SERPL-SCNC: 36 MMOL/L — HIGH (ref 22–31)
COLOR SPEC: YELLOW — SIGNIFICANT CHANGE UP
CREAT SERPL-MCNC: 0.7 MG/DL — SIGNIFICANT CHANGE UP (ref 0.5–1.3)
DIFF PNL FLD: NEGATIVE — SIGNIFICANT CHANGE UP
EOSINOPHIL # BLD AUTO: 0.2 K/UL — SIGNIFICANT CHANGE UP (ref 0–0.5)
EOSINOPHIL NFR BLD AUTO: 1.9 % — SIGNIFICANT CHANGE UP (ref 0–6)
EPI CELLS # UR: SIGNIFICANT CHANGE UP
GLUCOSE BLDC GLUCOMTR-MCNC: 118 MG/DL — HIGH (ref 70–99)
GLUCOSE BLDC GLUCOMTR-MCNC: 119 MG/DL — HIGH (ref 70–99)
GLUCOSE BLDC GLUCOMTR-MCNC: 129 MG/DL — HIGH (ref 70–99)
GLUCOSE BLDC GLUCOMTR-MCNC: 138 MG/DL — HIGH (ref 70–99)
GLUCOSE BLDC GLUCOMTR-MCNC: 162 MG/DL — HIGH (ref 70–99)
GLUCOSE SERPL-MCNC: 124 MG/DL — HIGH (ref 70–99)
GLUCOSE UR QL: 50 MG/DL
HCO3 BLDA-SCNC: 35 MMOL/L — HIGH (ref 21–29)
HCT VFR BLD CALC: 35.9 % — LOW (ref 39–50)
HCT VFR BLD CALC: 36.7 % — LOW (ref 39–50)
HGB BLD-MCNC: 11.4 G/DL — LOW (ref 13–17)
HGB BLD-MCNC: 11.6 G/DL — LOW (ref 13–17)
HOROWITZ INDEX BLDA+IHG-RTO: 35 — SIGNIFICANT CHANGE UP
IMM GRANULOCYTES NFR BLD AUTO: 0.4 % — SIGNIFICANT CHANGE UP (ref 0–1.5)
KETONES UR-MCNC: NEGATIVE — SIGNIFICANT CHANGE UP
LACTATE SERPL-SCNC: 0.7 MMOL/L — SIGNIFICANT CHANGE UP (ref 0.7–2)
LEUKOCYTE ESTERASE UR-ACNC: NEGATIVE — SIGNIFICANT CHANGE UP
LYMPHOCYTES # BLD AUTO: 1.22 K/UL — SIGNIFICANT CHANGE UP (ref 1–3.3)
LYMPHOCYTES # BLD AUTO: 11.4 % — LOW (ref 13–44)
MAGNESIUM SERPL-MCNC: 2.1 MG/DL — SIGNIFICANT CHANGE UP (ref 1.6–2.6)
MCHC RBC-ENTMCNC: 28.3 PG — SIGNIFICANT CHANGE UP (ref 27–34)
MCHC RBC-ENTMCNC: 28.3 PG — SIGNIFICANT CHANGE UP (ref 27–34)
MCHC RBC-ENTMCNC: 31.6 GM/DL — LOW (ref 32–36)
MCHC RBC-ENTMCNC: 31.8 GM/DL — LOW (ref 32–36)
MCV RBC AUTO: 89.1 FL — SIGNIFICANT CHANGE UP (ref 80–100)
MCV RBC AUTO: 89.5 FL — SIGNIFICANT CHANGE UP (ref 80–100)
MONOCYTES # BLD AUTO: 1.81 K/UL — HIGH (ref 0–0.9)
MONOCYTES NFR BLD AUTO: 16.9 % — HIGH (ref 2–14)
NEUTROPHILS # BLD AUTO: 7.39 K/UL — SIGNIFICANT CHANGE UP (ref 1.8–7.4)
NEUTROPHILS NFR BLD AUTO: 69.2 % — SIGNIFICANT CHANGE UP (ref 43–77)
NITRITE UR-MCNC: NEGATIVE — SIGNIFICANT CHANGE UP
NRBC # BLD: 0 /100 WBCS — SIGNIFICANT CHANGE UP (ref 0–0)
NRBC # BLD: 0 /100 WBCS — SIGNIFICANT CHANGE UP (ref 0–0)
PCO2 BLDA: 54 MMHG — HIGH (ref 32–46)
PH BLD: 7.43 — SIGNIFICANT CHANGE UP (ref 7.35–7.45)
PH UR: 6 — SIGNIFICANT CHANGE UP (ref 5–8)
PHOSPHATE SERPL-MCNC: 2.5 MG/DL — SIGNIFICANT CHANGE UP (ref 2.5–4.5)
PLATELET # BLD AUTO: 245 K/UL — SIGNIFICANT CHANGE UP (ref 150–400)
PLATELET # BLD AUTO: 296 K/UL — SIGNIFICANT CHANGE UP (ref 150–400)
PO2 BLDA: 68 MMHG — LOW (ref 74–108)
POTASSIUM SERPL-MCNC: 3.5 MMOL/L — SIGNIFICANT CHANGE UP (ref 3.5–5.3)
POTASSIUM SERPL-SCNC: 3.5 MMOL/L — SIGNIFICANT CHANGE UP (ref 3.5–5.3)
PROT UR-MCNC: 15 MG/DL
RBC # BLD: 4.03 M/UL — LOW (ref 4.2–5.8)
RBC # BLD: 4.1 M/UL — LOW (ref 4.2–5.8)
RBC # FLD: 15.3 % — HIGH (ref 10.3–14.5)
RBC # FLD: 15.3 % — HIGH (ref 10.3–14.5)
RBC CASTS # UR COMP ASSIST: SIGNIFICANT CHANGE UP /HPF (ref 0–4)
SAO2 % BLDA: 93 % — SIGNIFICANT CHANGE UP (ref 92–96)
SODIUM SERPL-SCNC: 139 MMOL/L — SIGNIFICANT CHANGE UP (ref 135–145)
SP GR SPEC: 1.01 — SIGNIFICANT CHANGE UP (ref 1.01–1.02)
UROBILINOGEN FLD QL: NEGATIVE MG/DL — SIGNIFICANT CHANGE UP
WBC # BLD: 10.68 K/UL — HIGH (ref 3.8–10.5)
WBC # BLD: 11.84 K/UL — HIGH (ref 3.8–10.5)
WBC # FLD AUTO: 10.68 K/UL — HIGH (ref 3.8–10.5)
WBC # FLD AUTO: 11.84 K/UL — HIGH (ref 3.8–10.5)
WBC UR QL: SIGNIFICANT CHANGE UP

## 2021-03-04 PROCEDURE — 99233 SBSQ HOSP IP/OBS HIGH 50: CPT

## 2021-03-04 RX ORDER — ACETAMINOPHEN 500 MG
650 TABLET ORAL EVERY 6 HOURS
Refills: 0 | Status: DISCONTINUED | OUTPATIENT
Start: 2021-03-04 | End: 2021-03-10

## 2021-03-04 RX ORDER — INSULIN LISPRO 100/ML
VIAL (ML) SUBCUTANEOUS
Refills: 0 | Status: DISCONTINUED | OUTPATIENT
Start: 2021-03-04 | End: 2021-03-10

## 2021-03-04 RX ORDER — POTASSIUM PHOSPHATE, MONOBASIC POTASSIUM PHOSPHATE, DIBASIC 236; 224 MG/ML; MG/ML
15 INJECTION, SOLUTION INTRAVENOUS ONCE
Refills: 0 | Status: COMPLETED | OUTPATIENT
Start: 2021-03-04 | End: 2021-03-04

## 2021-03-04 RX ORDER — INSULIN LISPRO 100/ML
VIAL (ML) SUBCUTANEOUS AT BEDTIME
Refills: 0 | Status: DISCONTINUED | OUTPATIENT
Start: 2021-03-04 | End: 2021-03-10

## 2021-03-04 RX ORDER — POTASSIUM CHLORIDE 20 MEQ
10 PACKET (EA) ORAL ONCE
Refills: 0 | Status: COMPLETED | OUTPATIENT
Start: 2021-03-04 | End: 2021-03-04

## 2021-03-04 RX ADMIN — DEXMEDETOMIDINE HYDROCHLORIDE IN 0.9% SODIUM CHLORIDE 2.56 MICROGRAM(S)/KG/HR: 4 INJECTION INTRAVENOUS at 01:59

## 2021-03-04 RX ADMIN — SODIUM CHLORIDE 3 MILLILITER(S): 9 INJECTION INTRAMUSCULAR; INTRAVENOUS; SUBCUTANEOUS at 23:20

## 2021-03-04 RX ADMIN — Medication 100 MILLIEQUIVALENT(S): at 11:42

## 2021-03-04 RX ADMIN — Medication 650 MILLIGRAM(S): at 23:00

## 2021-03-04 RX ADMIN — SODIUM CHLORIDE 3 MILLILITER(S): 9 INJECTION INTRAMUSCULAR; INTRAVENOUS; SUBCUTANEOUS at 11:47

## 2021-03-04 RX ADMIN — Medication 1 PATCH: at 00:37

## 2021-03-04 RX ADMIN — Medication 3 MILLILITER(S): at 04:37

## 2021-03-04 RX ADMIN — CHLORHEXIDINE GLUCONATE 1 APPLICATION(S): 213 SOLUTION TOPICAL at 11:43

## 2021-03-04 RX ADMIN — POLYETHYLENE GLYCOL 3350 17 GRAM(S): 17 POWDER, FOR SOLUTION ORAL at 12:38

## 2021-03-04 RX ADMIN — ENOXAPARIN SODIUM 40 MILLIGRAM(S): 100 INJECTION SUBCUTANEOUS at 11:42

## 2021-03-04 RX ADMIN — SODIUM CHLORIDE 3 MILLILITER(S): 9 INJECTION INTRAMUSCULAR; INTRAVENOUS; SUBCUTANEOUS at 04:37

## 2021-03-04 RX ADMIN — SODIUM CHLORIDE 3 MILLILITER(S): 9 INJECTION INTRAMUSCULAR; INTRAVENOUS; SUBCUTANEOUS at 17:12

## 2021-03-04 RX ADMIN — Medication 1 PATCH: at 11:42

## 2021-03-04 RX ADMIN — Medication 1 PATCH: at 20:40

## 2021-03-04 RX ADMIN — Medication 650 MILLIGRAM(S): at 21:56

## 2021-03-04 RX ADMIN — POTASSIUM PHOSPHATE, MONOBASIC POTASSIUM PHOSPHATE, DIBASIC 62.5 MILLIMOLE(S): 236; 224 INJECTION, SOLUTION INTRAVENOUS at 06:06

## 2021-03-04 RX ADMIN — Medication 20 MILLIGRAM(S): at 06:09

## 2021-03-04 RX ADMIN — Medication 3 MILLILITER(S): at 23:20

## 2021-03-04 RX ADMIN — CEFTRIAXONE 100 MILLIGRAM(S): 500 INJECTION, POWDER, FOR SOLUTION INTRAMUSCULAR; INTRAVENOUS at 03:32

## 2021-03-04 RX ADMIN — Medication 1 PATCH: at 12:00

## 2021-03-04 RX ADMIN — Medication 3 MILLILITER(S): at 17:12

## 2021-03-04 RX ADMIN — Medication 1 PATCH: at 08:37

## 2021-03-04 RX ADMIN — Medication 3 MILLILITER(S): at 11:47

## 2021-03-04 RX ADMIN — PANTOPRAZOLE SODIUM 40 MILLIGRAM(S): 20 TABLET, DELAYED RELEASE ORAL at 11:42

## 2021-03-04 NOTE — CHART NOTE - NSCHARTNOTEFT_GEN_A_CORE
pt seen and examined on rounds with ICU team 3/4    24 hr events  no acute events  calm and cooperative  weaned off precedex drip    [x] MEDS reviewed in EMR  [x] Labs reviewed  [x] vitals reviewed  [x] ROS: pt without complaints, denies HA, no fever, no chills, no SOB, no cough, no abdominal pain, no N/V/D. All other ROS negative    55M PMH HTN, DM, asthma/COPD, ETOH use presents for generalized weakness, SOB, productive cough and chest congestion. Found to be hypoxic to the 80s, placed on NRB. Pt became agitated, more hypoxic, placed on biPAP. intubated for acute hypoxic and acute on chronic hypercapnic respiratory failure. Post intubation pt with large R PTX s/p need for emergent R chest tube placement for decompression. Shock state post intubation requiring vasopressor support. On antibx treating for empiric PNA. Extubated 3/1. Agitated post extubation due to delirium - placed on precedex drip. Chest tube removed 3/2    DX: acute hypoxic respiratory failure, acute on chronic hypercarbic respiratory failure, R PTX s/p chest tube placement, PNA covering gram negative organisms, COPD exacerbation, delirium    - remains extubated x3 days  - chest tubed d/angela 3/2  - weaned off ventimask, now on nasal cannula  - can d/c solumderol s/p 1 week course of steroids for COPD exacerbation  - off azithromycin, completed 7 day course of ceftriaxone today for CAP  - BP stable off pressors  - KATINA resolved  - follow up blood cx  - hypoglycemia resolved:  lantus d/angela due to hypoglycemia, off D5 gtt as blood sugar improved  - passed swallow eval: tolerating po intake - puree with honey thickened fluids  - PT  - stable for transfer to medical service  - d/w patient plan of care

## 2021-03-05 DIAGNOSIS — J15.6 PNEUMONIA DUE TO OTHER GRAM-NEGATIVE BACTERIA: ICD-10-CM

## 2021-03-05 DIAGNOSIS — J93.9 PNEUMOTHORAX, UNSPECIFIED: ICD-10-CM

## 2021-03-05 DIAGNOSIS — E43 UNSPECIFIED SEVERE PROTEIN-CALORIE MALNUTRITION: ICD-10-CM

## 2021-03-05 DIAGNOSIS — I10 ESSENTIAL (PRIMARY) HYPERTENSION: ICD-10-CM

## 2021-03-05 DIAGNOSIS — E11.9 TYPE 2 DIABETES MELLITUS WITHOUT COMPLICATIONS: ICD-10-CM

## 2021-03-05 DIAGNOSIS — J44.9 CHRONIC OBSTRUCTIVE PULMONARY DISEASE, UNSPECIFIED: ICD-10-CM

## 2021-03-05 LAB
ANION GAP SERPL CALC-SCNC: 3 MMOL/L — LOW (ref 5–17)
BUN SERPL-MCNC: 16 MG/DL — SIGNIFICANT CHANGE UP (ref 7–23)
CALCIUM SERPL-MCNC: 8.2 MG/DL — LOW (ref 8.5–10.1)
CHLORIDE SERPL-SCNC: 99 MMOL/L — SIGNIFICANT CHANGE UP (ref 96–108)
CO2 SERPL-SCNC: 38 MMOL/L — HIGH (ref 22–31)
CREAT SERPL-MCNC: 0.59 MG/DL — SIGNIFICANT CHANGE UP (ref 0.5–1.3)
GLUCOSE BLDC GLUCOMTR-MCNC: 102 MG/DL — HIGH (ref 70–99)
GLUCOSE BLDC GLUCOMTR-MCNC: 113 MG/DL — HIGH (ref 70–99)
GLUCOSE BLDC GLUCOMTR-MCNC: 134 MG/DL — HIGH (ref 70–99)
GLUCOSE BLDC GLUCOMTR-MCNC: 137 MG/DL — HIGH (ref 70–99)
GLUCOSE SERPL-MCNC: 121 MG/DL — HIGH (ref 70–99)
HCT VFR BLD CALC: 36 % — LOW (ref 39–50)
HGB BLD-MCNC: 11.3 G/DL — LOW (ref 13–17)
MAGNESIUM SERPL-MCNC: 2 MG/DL — SIGNIFICANT CHANGE UP (ref 1.6–2.6)
MCHC RBC-ENTMCNC: 28.4 PG — SIGNIFICANT CHANGE UP (ref 27–34)
MCHC RBC-ENTMCNC: 31.4 GM/DL — LOW (ref 32–36)
MCV RBC AUTO: 90.5 FL — SIGNIFICANT CHANGE UP (ref 80–100)
NRBC # BLD: 0 /100 WBCS — SIGNIFICANT CHANGE UP (ref 0–0)
PHOSPHATE SERPL-MCNC: 2.5 MG/DL — SIGNIFICANT CHANGE UP (ref 2.5–4.5)
PLATELET # BLD AUTO: 314 K/UL — SIGNIFICANT CHANGE UP (ref 150–400)
POTASSIUM SERPL-MCNC: 3.3 MMOL/L — LOW (ref 3.5–5.3)
POTASSIUM SERPL-SCNC: 3.3 MMOL/L — LOW (ref 3.5–5.3)
PROCALCITONIN SERPL-MCNC: 0.21 NG/ML — HIGH (ref 0.02–0.1)
RBC # BLD: 3.98 M/UL — LOW (ref 4.2–5.8)
RBC # FLD: 15.3 % — HIGH (ref 10.3–14.5)
SODIUM SERPL-SCNC: 140 MMOL/L — SIGNIFICANT CHANGE UP (ref 135–145)
WBC # BLD: 10.05 K/UL — SIGNIFICANT CHANGE UP (ref 3.8–10.5)
WBC # FLD AUTO: 10.05 K/UL — SIGNIFICANT CHANGE UP (ref 3.8–10.5)

## 2021-03-05 PROCEDURE — 99233 SBSQ HOSP IP/OBS HIGH 50: CPT

## 2021-03-05 RX ORDER — QUETIAPINE FUMARATE 200 MG/1
25 TABLET, FILM COATED ORAL ONCE
Refills: 0 | Status: COMPLETED | OUTPATIENT
Start: 2021-03-05 | End: 2021-03-05

## 2021-03-05 RX ORDER — POTASSIUM CHLORIDE 20 MEQ
40 PACKET (EA) ORAL ONCE
Refills: 0 | Status: COMPLETED | OUTPATIENT
Start: 2021-03-05 | End: 2021-03-05

## 2021-03-05 RX ADMIN — SENNA PLUS 2 TABLET(S): 8.6 TABLET ORAL at 21:24

## 2021-03-05 RX ADMIN — Medication 1 PATCH: at 07:24

## 2021-03-05 RX ADMIN — SODIUM CHLORIDE 3 MILLILITER(S): 9 INJECTION INTRAMUSCULAR; INTRAVENOUS; SUBCUTANEOUS at 11:02

## 2021-03-05 RX ADMIN — Medication 40 MILLIEQUIVALENT(S): at 18:06

## 2021-03-05 RX ADMIN — POLYETHYLENE GLYCOL 3350 17 GRAM(S): 17 POWDER, FOR SOLUTION ORAL at 11:27

## 2021-03-05 RX ADMIN — Medication 3 MILLILITER(S): at 23:15

## 2021-03-05 RX ADMIN — SODIUM CHLORIDE 3 MILLILITER(S): 9 INJECTION INTRAMUSCULAR; INTRAVENOUS; SUBCUTANEOUS at 23:15

## 2021-03-05 RX ADMIN — Medication 3 MILLILITER(S): at 05:00

## 2021-03-05 RX ADMIN — ENOXAPARIN SODIUM 40 MILLIGRAM(S): 100 INJECTION SUBCUTANEOUS at 11:26

## 2021-03-05 RX ADMIN — Medication 3 MILLILITER(S): at 11:01

## 2021-03-05 RX ADMIN — Medication 1 PATCH: at 12:30

## 2021-03-05 RX ADMIN — Medication 3 MILLILITER(S): at 17:04

## 2021-03-05 RX ADMIN — QUETIAPINE FUMARATE 25 MILLIGRAM(S): 200 TABLET, FILM COATED ORAL at 21:24

## 2021-03-05 RX ADMIN — Medication 1 PATCH: at 11:26

## 2021-03-05 RX ADMIN — SODIUM CHLORIDE 3 MILLILITER(S): 9 INJECTION INTRAMUSCULAR; INTRAVENOUS; SUBCUTANEOUS at 05:00

## 2021-03-05 NOTE — DIETITIAN NUTRITION RISK NOTIFICATION - TREATMENT: THE FOLLOWING DIET HAS BEEN RECOMMENDED
Diet, NPO with Tube Feed:   Tube Feeding Modality: Orogastric  Vital AF 1.2  Total Volume for 24 Hours (mL): 960  Continuous  Starting Tube Feed Rate {mL per Hour}: 20  Increase Tube Feed Rate by (mL): 5     Every 8 hours  Until Goal Tube Feed Rate (mL per Hour): 40  Tube Feed Duration (in Hours): 24  Tube Feed Start Time: 13:00 (02-26-21 @ 12:17) [Pending Verification By Attending]  Diet, NPO:   Except Medications     Special Instructions for Nursing:  Except Medications (02-25-21 @ 22:48) [Active]      
Diet, Dysphagia 1 Pureed-Honey Consistency Fluid:   Supplement Feeding Modality:  Oral  Ensure Pudding Cans or Servings Per Day:  1       Frequency:  Three Times a day (03-05-21 @ 13:04) [Pending Verification By Attending]  Diet, Dysphagia 1 Pureed-Honey Consistency Fluid (03-04-21 @ 08:58) [Active]

## 2021-03-05 NOTE — CHART NOTE - NSCHARTNOTEFT_GEN_A_CORE
Pt admitted c weakness, cough, SOB; found c respiratory failure requiring intubation in the ER (2/26); successfully extubated 3/1.  Per swallow eval (3/3) SLP recommends pureed c honey-thick liquids.   Pt remains confused, disoriented. PMHx includes T2 DM, HTN, Asthma.      Factors impacting intake: [ X] none [ ] nausea  [ ] vomiting [ ] diarrhea [ ] constipation  [ ]chewing problems [ ] swallowing issues  [ ] other:     Diet Prescription: Diet, Dysphagia 1 Pureed-Honey Consistency Fluid (03-04-21 @ 08:58)    Intake:   Pt was on tube feeding from 2/26 - 3/3; advanced to PO diet yesterday; 3/4; per pt & staff reports eating well    Current Weight: Weight (kg):  55.8 kg (3/5); admission wt 51.1 kg (2/26)  % Weight Change:  8.4% wt gain x 1 week; at least partially due to fluid retention    3+ edema noted b/l hands; no edema present at admission    Nutrition focused physical exam conducted:    Subcutaneous fat loss: [moderate ] Orbital fat pads region, [moderate ]Buccal fat region, [severe ]Triceps region,  [severe ]Ribs region.    Muscle wasting: [severe ]Temples region, [severe ]Clavicle region, [severe ]Shoulder region, [moderate ]Scapula region, [unable ]Interosseous region,  [unable ]thigh region, [severe ]Calf region    Pertinent Medications: MEDICATIONS  (STANDING):  albuterol/ipratropium for Nebulization 3 milliLiter(s) Nebulizer every 6 hours  chlorhexidine 4% Liquid 1 Application(s) Topical <User Schedule>  dextrose 40% Gel 15 Gram(s) Oral once  dextrose 5%. 1000 milliLiter(s) (50 mL/Hr) IV Continuous <Continuous>  dextrose 5%. 1000 milliLiter(s) (100 mL/Hr) IV Continuous <Continuous>  dextrose 50% Injectable 25 Gram(s) IV Push once  dextrose 50% Injectable 12.5 Gram(s) IV Push once  dextrose 50% Injectable 25 Gram(s) IV Push once  enoxaparin Injectable 40 milliGRAM(s) SubCutaneous daily  glucagon  Injectable 1 milliGRAM(s) IntraMuscular once  insulin lispro (ADMELOG) corrective regimen sliding scale   SubCutaneous three times a day before meals  insulin lispro (ADMELOG) corrective regimen sliding scale   SubCutaneous at bedtime  nicotine - 21 mG/24Hr(s) Patch 1 patch Transdermal daily  polyethylene glycol 3350 17 Gram(s) Oral daily  senna 2 Tablet(s) Oral at bedtime  sodium chloride 3%  Inhalation 3 milliLiter(s) Inhalation every 6 hours    MEDICATIONS  (PRN):  acetaminophen    Suspension .. 650 milliGRAM(s) Oral every 6 hours PRN Temp greater or equal to 38C (100.4F), Mild Pain (1 - 3)  ALBUTerol    0.083% 2.5 milliGRAM(s) Nebulizer every 4 hours PRN Shortness of Breath and/or Wheezing  sodium chloride 0.9% lock flush 10 milliLiter(s) IV Push every 1 hour PRN Pre/post blood products, medications, blood draw, and to maintain line patency    Pertinent Labs: 03-05 Na140 mmol/L Glu 121 mg/dL<H> K+ 3.3 mmol/L<L> Cr  0.59 mg/dL BUN 16 mg/dL 03-05 Phos 2.5 mg/dL 03-03 Alb 2.5 g/dL<L>  02-28-21  A1C 7.3%, average glu 163; 03-04 POCT:  129, 118, 138, 119, 162    Skin:  WDL     Estimated Needs:   [X ] no change since previous assessment  (2/26)  [ ] recalculated:     Previous Nutrition Diagnosis:   [X ] Moderate Malnutrition in context of chronic illness  Etiology:  Inadequate energy/protein intake related to hx DM, asthma, HTN  Signs & Symptoms:  physical findings of mild/moderate fat/muscle loss    Nutrition Diagnosis is [ ] ongoing  [ ] resolved [X ] not applicable     New Nutrition Diagnosis:   [X ] Severe Malnutrition in context of acute illness    Related to (etiology): Inadequate energy/protein intake related to respiratory failure requiring intubation, lethargy    As evidenced by (signs & symptoms):  physical findings of severe fat depletion & muscle wasting as noted      Goal/Expected Outcome:  Pt to consume >75% meals/supplements during hospitalization      Interventions:  continue current diet rx as noted (pureed/honey)  Recommend  [ ] Change Diet To:  [X ] Nutrition Supplement:  Ensure Pudding x 3/day (provides 510 kcal, 12 g protein)   [ ] Nutrition Support  [ ] Other:     Monitoring and Evaluation:   [X ] PO intake [ x ] Tolerance to diet prescription [ x ] weights [ x ] labs[ x ] follow up per protocol  [ ] other:

## 2021-03-06 LAB
ANION GAP SERPL CALC-SCNC: 6 MMOL/L — SIGNIFICANT CHANGE UP (ref 5–17)
BUN SERPL-MCNC: 9 MG/DL — SIGNIFICANT CHANGE UP (ref 7–23)
CALCIUM SERPL-MCNC: 8.7 MG/DL — SIGNIFICANT CHANGE UP (ref 8.5–10.1)
CHLORIDE SERPL-SCNC: 100 MMOL/L — SIGNIFICANT CHANGE UP (ref 96–108)
CO2 SERPL-SCNC: 35 MMOL/L — HIGH (ref 22–31)
CREAT SERPL-MCNC: 0.71 MG/DL — SIGNIFICANT CHANGE UP (ref 0.5–1.3)
GLUCOSE BLDC GLUCOMTR-MCNC: 109 MG/DL — HIGH (ref 70–99)
GLUCOSE BLDC GLUCOMTR-MCNC: 123 MG/DL — HIGH (ref 70–99)
GLUCOSE BLDC GLUCOMTR-MCNC: 129 MG/DL — HIGH (ref 70–99)
GLUCOSE BLDC GLUCOMTR-MCNC: 140 MG/DL — HIGH (ref 70–99)
GLUCOSE SERPL-MCNC: 143 MG/DL — HIGH (ref 70–99)
POTASSIUM SERPL-MCNC: 3.8 MMOL/L — SIGNIFICANT CHANGE UP (ref 3.5–5.3)
POTASSIUM SERPL-SCNC: 3.8 MMOL/L — SIGNIFICANT CHANGE UP (ref 3.5–5.3)
SODIUM SERPL-SCNC: 141 MMOL/L — SIGNIFICANT CHANGE UP (ref 135–145)

## 2021-03-06 PROCEDURE — 99232 SBSQ HOSP IP/OBS MODERATE 35: CPT

## 2021-03-06 RX ORDER — AMLODIPINE BESYLATE 2.5 MG/1
10 TABLET ORAL DAILY
Refills: 0 | Status: DISCONTINUED | OUTPATIENT
Start: 2021-03-06 | End: 2021-03-10

## 2021-03-06 RX ADMIN — Medication 1 PATCH: at 11:52

## 2021-03-06 RX ADMIN — Medication 3 MILLILITER(S): at 11:08

## 2021-03-06 RX ADMIN — AMLODIPINE BESYLATE 10 MILLIGRAM(S): 2.5 TABLET ORAL at 17:55

## 2021-03-06 RX ADMIN — POLYETHYLENE GLYCOL 3350 17 GRAM(S): 17 POWDER, FOR SOLUTION ORAL at 12:01

## 2021-03-06 RX ADMIN — Medication 1 PATCH: at 07:41

## 2021-03-06 RX ADMIN — Medication 1 PATCH: at 21:09

## 2021-03-06 RX ADMIN — Medication 3 MILLILITER(S): at 17:07

## 2021-03-06 RX ADMIN — SODIUM CHLORIDE 3 MILLILITER(S): 9 INJECTION INTRAMUSCULAR; INTRAVENOUS; SUBCUTANEOUS at 17:07

## 2021-03-06 RX ADMIN — SODIUM CHLORIDE 3 MILLILITER(S): 9 INJECTION INTRAMUSCULAR; INTRAVENOUS; SUBCUTANEOUS at 05:13

## 2021-03-06 RX ADMIN — Medication 3 MILLILITER(S): at 05:12

## 2021-03-06 RX ADMIN — ENOXAPARIN SODIUM 40 MILLIGRAM(S): 100 INJECTION SUBCUTANEOUS at 12:01

## 2021-03-06 RX ADMIN — SENNA PLUS 2 TABLET(S): 8.6 TABLET ORAL at 21:45

## 2021-03-06 RX ADMIN — SODIUM CHLORIDE 3 MILLILITER(S): 9 INJECTION INTRAMUSCULAR; INTRAVENOUS; SUBCUTANEOUS at 11:08

## 2021-03-06 RX ADMIN — Medication 1 PATCH: at 03:32

## 2021-03-06 RX ADMIN — Medication 1 PATCH: at 11:15

## 2021-03-07 LAB
ALBUMIN SERPL ELPH-MCNC: 2.8 G/DL — LOW (ref 3.3–5)
ALP SERPL-CCNC: 55 U/L — SIGNIFICANT CHANGE UP (ref 40–120)
ALT FLD-CCNC: 56 U/L — SIGNIFICANT CHANGE UP (ref 12–78)
ANION GAP SERPL CALC-SCNC: 2 MMOL/L — LOW (ref 5–17)
AST SERPL-CCNC: 35 U/L — SIGNIFICANT CHANGE UP (ref 15–37)
BILIRUB SERPL-MCNC: 0.4 MG/DL — SIGNIFICANT CHANGE UP (ref 0.2–1.2)
BUN SERPL-MCNC: 7 MG/DL — SIGNIFICANT CHANGE UP (ref 7–23)
CALCIUM SERPL-MCNC: 8.3 MG/DL — LOW (ref 8.5–10.1)
CHLORIDE SERPL-SCNC: 105 MMOL/L — SIGNIFICANT CHANGE UP (ref 96–108)
CO2 SERPL-SCNC: 34 MMOL/L — HIGH (ref 22–31)
CREAT SERPL-MCNC: 0.7 MG/DL — SIGNIFICANT CHANGE UP (ref 0.5–1.3)
GLUCOSE BLDC GLUCOMTR-MCNC: 131 MG/DL — HIGH (ref 70–99)
GLUCOSE BLDC GLUCOMTR-MCNC: 158 MG/DL — HIGH (ref 70–99)
GLUCOSE BLDC GLUCOMTR-MCNC: 217 MG/DL — HIGH (ref 70–99)
GLUCOSE BLDC GLUCOMTR-MCNC: 95 MG/DL — SIGNIFICANT CHANGE UP (ref 70–99)
GLUCOSE SERPL-MCNC: 128 MG/DL — HIGH (ref 70–99)
HCT VFR BLD CALC: 42.1 % — SIGNIFICANT CHANGE UP (ref 39–50)
HGB BLD-MCNC: 13 G/DL — SIGNIFICANT CHANGE UP (ref 13–17)
MAGNESIUM SERPL-MCNC: 1.7 MG/DL — SIGNIFICANT CHANGE UP (ref 1.6–2.6)
MCHC RBC-ENTMCNC: 28.2 PG — SIGNIFICANT CHANGE UP (ref 27–34)
MCHC RBC-ENTMCNC: 30.9 GM/DL — LOW (ref 32–36)
MCV RBC AUTO: 91.3 FL — SIGNIFICANT CHANGE UP (ref 80–100)
NRBC # BLD: 0 /100 WBCS — SIGNIFICANT CHANGE UP (ref 0–0)
PHOSPHATE SERPL-MCNC: 2.9 MG/DL — SIGNIFICANT CHANGE UP (ref 2.5–4.5)
PLATELET # BLD AUTO: 209 K/UL — SIGNIFICANT CHANGE UP (ref 150–400)
POTASSIUM SERPL-MCNC: 3.9 MMOL/L — SIGNIFICANT CHANGE UP (ref 3.5–5.3)
POTASSIUM SERPL-SCNC: 3.9 MMOL/L — SIGNIFICANT CHANGE UP (ref 3.5–5.3)
PROT SERPL-MCNC: 6.8 GM/DL — SIGNIFICANT CHANGE UP (ref 6–8.3)
RBC # BLD: 4.61 M/UL — SIGNIFICANT CHANGE UP (ref 4.2–5.8)
RBC # FLD: 15.6 % — HIGH (ref 10.3–14.5)
SODIUM SERPL-SCNC: 141 MMOL/L — SIGNIFICANT CHANGE UP (ref 135–145)
WBC # BLD: 8.16 K/UL — SIGNIFICANT CHANGE UP (ref 3.8–10.5)
WBC # FLD AUTO: 8.16 K/UL — SIGNIFICANT CHANGE UP (ref 3.8–10.5)

## 2021-03-07 PROCEDURE — 99232 SBSQ HOSP IP/OBS MODERATE 35: CPT

## 2021-03-07 RX ADMIN — POLYETHYLENE GLYCOL 3350 17 GRAM(S): 17 POWDER, FOR SOLUTION ORAL at 11:58

## 2021-03-07 RX ADMIN — Medication 1 PATCH: at 07:55

## 2021-03-07 RX ADMIN — ENOXAPARIN SODIUM 40 MILLIGRAM(S): 100 INJECTION SUBCUTANEOUS at 11:57

## 2021-03-07 RX ADMIN — Medication 3 MILLILITER(S): at 11:15

## 2021-03-07 RX ADMIN — Medication 2: at 11:57

## 2021-03-07 RX ADMIN — Medication 3 MILLILITER(S): at 05:50

## 2021-03-07 RX ADMIN — Medication 1 PATCH: at 11:02

## 2021-03-07 RX ADMIN — Medication 1 PATCH: at 11:58

## 2021-03-07 RX ADMIN — Medication 3 MILLILITER(S): at 17:11

## 2021-03-07 RX ADMIN — Medication 1 PATCH: at 21:01

## 2021-03-07 RX ADMIN — Medication 3 MILLILITER(S): at 00:51

## 2021-03-07 RX ADMIN — AMLODIPINE BESYLATE 10 MILLIGRAM(S): 2.5 TABLET ORAL at 05:37

## 2021-03-07 RX ADMIN — SODIUM CHLORIDE 3 MILLILITER(S): 9 INJECTION INTRAMUSCULAR; INTRAVENOUS; SUBCUTANEOUS at 00:51

## 2021-03-07 RX ADMIN — SODIUM CHLORIDE 3 MILLILITER(S): 9 INJECTION INTRAMUSCULAR; INTRAVENOUS; SUBCUTANEOUS at 11:15

## 2021-03-07 RX ADMIN — SODIUM CHLORIDE 3 MILLILITER(S): 9 INJECTION INTRAMUSCULAR; INTRAVENOUS; SUBCUTANEOUS at 05:50

## 2021-03-07 RX ADMIN — SENNA PLUS 2 TABLET(S): 8.6 TABLET ORAL at 21:30

## 2021-03-07 RX ADMIN — SODIUM CHLORIDE 3 MILLILITER(S): 9 INJECTION INTRAMUSCULAR; INTRAVENOUS; SUBCUTANEOUS at 17:12

## 2021-03-08 LAB
CULTURE RESULTS: SIGNIFICANT CHANGE UP
CULTURE RESULTS: SIGNIFICANT CHANGE UP
GLUCOSE BLDC GLUCOMTR-MCNC: 100 MG/DL — HIGH (ref 70–99)
GLUCOSE BLDC GLUCOMTR-MCNC: 112 MG/DL — HIGH (ref 70–99)
GLUCOSE BLDC GLUCOMTR-MCNC: 156 MG/DL — HIGH (ref 70–99)
GLUCOSE BLDC GLUCOMTR-MCNC: 169 MG/DL — HIGH (ref 70–99)
RAPID RVP RESULT: SIGNIFICANT CHANGE UP
SARS-COV-2 RNA SPEC QL NAA+PROBE: SIGNIFICANT CHANGE UP
SPECIMEN SOURCE: SIGNIFICANT CHANGE UP
SPECIMEN SOURCE: SIGNIFICANT CHANGE UP

## 2021-03-08 PROCEDURE — 99232 SBSQ HOSP IP/OBS MODERATE 35: CPT

## 2021-03-08 RX ADMIN — CHLORHEXIDINE GLUCONATE 1 APPLICATION(S): 213 SOLUTION TOPICAL at 08:09

## 2021-03-08 RX ADMIN — Medication 1 PATCH: at 11:10

## 2021-03-08 RX ADMIN — SODIUM CHLORIDE 3 MILLILITER(S): 9 INJECTION INTRAMUSCULAR; INTRAVENOUS; SUBCUTANEOUS at 17:08

## 2021-03-08 RX ADMIN — Medication 1 PATCH: at 08:09

## 2021-03-08 RX ADMIN — Medication 3 MILLILITER(S): at 00:23

## 2021-03-08 RX ADMIN — SODIUM CHLORIDE 3 MILLILITER(S): 9 INJECTION INTRAMUSCULAR; INTRAVENOUS; SUBCUTANEOUS at 23:44

## 2021-03-08 RX ADMIN — ENOXAPARIN SODIUM 40 MILLIGRAM(S): 100 INJECTION SUBCUTANEOUS at 11:43

## 2021-03-08 RX ADMIN — Medication 2: at 11:43

## 2021-03-08 RX ADMIN — SODIUM CHLORIDE 3 MILLILITER(S): 9 INJECTION INTRAMUSCULAR; INTRAVENOUS; SUBCUTANEOUS at 12:13

## 2021-03-08 RX ADMIN — Medication 3 MILLILITER(S): at 23:44

## 2021-03-08 RX ADMIN — Medication 3 MILLILITER(S): at 12:13

## 2021-03-08 RX ADMIN — Medication 3 MILLILITER(S): at 05:41

## 2021-03-08 RX ADMIN — POLYETHYLENE GLYCOL 3350 17 GRAM(S): 17 POWDER, FOR SOLUTION ORAL at 11:44

## 2021-03-08 RX ADMIN — AMLODIPINE BESYLATE 10 MILLIGRAM(S): 2.5 TABLET ORAL at 05:23

## 2021-03-08 RX ADMIN — SODIUM CHLORIDE 3 MILLILITER(S): 9 INJECTION INTRAMUSCULAR; INTRAVENOUS; SUBCUTANEOUS at 05:41

## 2021-03-08 RX ADMIN — Medication 1 PATCH: at 11:44

## 2021-03-08 RX ADMIN — Medication 3 MILLILITER(S): at 17:08

## 2021-03-08 RX ADMIN — SODIUM CHLORIDE 3 MILLILITER(S): 9 INJECTION INTRAMUSCULAR; INTRAVENOUS; SUBCUTANEOUS at 00:23

## 2021-03-09 LAB
GLUCOSE BLDC GLUCOMTR-MCNC: 124 MG/DL — HIGH (ref 70–99)
GLUCOSE BLDC GLUCOMTR-MCNC: 139 MG/DL — HIGH (ref 70–99)
GLUCOSE BLDC GLUCOMTR-MCNC: 165 MG/DL — HIGH (ref 70–99)
GLUCOSE BLDC GLUCOMTR-MCNC: 205 MG/DL — HIGH (ref 70–99)

## 2021-03-09 PROCEDURE — 99232 SBSQ HOSP IP/OBS MODERATE 35: CPT

## 2021-03-09 RX ORDER — ALBUTEROL 90 UG/1
2 AEROSOL, METERED ORAL
Qty: 1 | Refills: 0
Start: 2021-03-09

## 2021-03-09 RX ORDER — NICOTINE POLACRILEX 2 MG
1 GUM BUCCAL
Qty: 30 | Refills: 0
Start: 2021-03-09 | End: 2021-04-07

## 2021-03-09 RX ORDER — AMLODIPINE BESYLATE 2.5 MG/1
1 TABLET ORAL
Qty: 30 | Refills: 0
Start: 2021-03-09 | End: 2021-04-07

## 2021-03-09 RX ORDER — METFORMIN HYDROCHLORIDE 850 MG/1
1 TABLET ORAL
Qty: 60 | Refills: 0
Start: 2021-03-09 | End: 2021-04-07

## 2021-03-09 RX ADMIN — Medication 1 MILLIGRAM(S): at 22:54

## 2021-03-09 RX ADMIN — Medication 1 PATCH: at 12:19

## 2021-03-09 RX ADMIN — Medication 4: at 08:19

## 2021-03-09 RX ADMIN — POLYETHYLENE GLYCOL 3350 17 GRAM(S): 17 POWDER, FOR SOLUTION ORAL at 12:20

## 2021-03-09 RX ADMIN — CHLORHEXIDINE GLUCONATE 1 APPLICATION(S): 213 SOLUTION TOPICAL at 08:19

## 2021-03-09 RX ADMIN — Medication 3 MILLILITER(S): at 17:14

## 2021-03-09 RX ADMIN — Medication 3 MILLILITER(S): at 11:42

## 2021-03-09 RX ADMIN — SODIUM CHLORIDE 3 MILLILITER(S): 9 INJECTION INTRAMUSCULAR; INTRAVENOUS; SUBCUTANEOUS at 05:58

## 2021-03-09 RX ADMIN — AMLODIPINE BESYLATE 10 MILLIGRAM(S): 2.5 TABLET ORAL at 05:56

## 2021-03-09 RX ADMIN — Medication 3 MILLILITER(S): at 05:57

## 2021-03-09 RX ADMIN — ENOXAPARIN SODIUM 40 MILLIGRAM(S): 100 INJECTION SUBCUTANEOUS at 12:19

## 2021-03-09 RX ADMIN — SODIUM CHLORIDE 3 MILLILITER(S): 9 INJECTION INTRAMUSCULAR; INTRAVENOUS; SUBCUTANEOUS at 17:15

## 2021-03-09 RX ADMIN — Medication 1 PATCH: at 20:09

## 2021-03-09 RX ADMIN — SODIUM CHLORIDE 3 MILLILITER(S): 9 INJECTION INTRAMUSCULAR; INTRAVENOUS; SUBCUTANEOUS at 11:42

## 2021-03-09 RX ADMIN — Medication 1 PATCH: at 08:00

## 2021-03-09 RX ADMIN — Medication 1 PATCH: at 11:25

## 2021-03-09 RX ADMIN — Medication 2: at 12:20

## 2021-03-09 NOTE — PROGRESS NOTE ADULT - PROBLEM SELECTOR PLAN 3
appreciate nutrition

## 2021-03-09 NOTE — PROGRESS NOTE ADULT - PROBLEM SELECTOR PLAN 6
stable s/p chest tube

## 2021-03-09 NOTE — DISCHARGE NOTE NURSING/CASE MANAGEMENT/SOCIAL WORK - NSSCCARECORD_GEN_ALL_CORE
Home Care Agency/Durable Medical Equipment Agency Home Care Agency/Durable Medical Equipment Agency/[facility]

## 2021-03-09 NOTE — DISCHARGE NOTE NURSING/CASE MANAGEMENT/SOCIAL WORK - PATIENT PORTAL LINK FT
You can access the FollowMyHealth Patient Portal offered by [facility] by registering at the following website: [email or web address]. By joining [facility]’s FollowMyHealth portal, you will also be able to view your health information using other applications (apps) compatible with our system.

## 2021-03-09 NOTE — PROGRESS NOTE ADULT - PROBLEM SELECTOR PLAN 5
A1c consider metformin at discharge

## 2021-03-09 NOTE — DISCHARGE NOTE PROVIDER - HOSPITAL COURSE
55 year old man with a past medical history of asthma, severe COPD, DM and HTN. Pt was bought in by EMS on 2/25 with complaints of generalized weakness, SOB and chest congestion. Found to be hypoxic and hypercapnic. Was placed on BIPAP with no improvement then got intubated. Admitted for acute on chronic respiratory failure likely secondary to COPD exacerbation. Post intubation patient was very hypoxemic and hypotensive and found to have large R pneumothorax. Emergent decompression done followed by chest tube placement. Patient was on levophed as well. Also started on course of steroids for COPD exacerbation and antibiotics for possible pneumonia. Patient improved and was extubated on 3/1. Chest tube was removed on 3/2. No more pneumothorax on cxr. Patient had an episode of delirium which he was placed on Precedex drip which is now off. Patient on regular diet. On nasal cannula 2L. Patient seen by ICU attending and deemed stable for transfer to the medical floor.     On floor patient was observed  and treated  patient was weaned off oxygen and was discharged on room air   blood pressure was controlled   PT worked with patient and he was ambulatory at discharge   Notes    Notes:  phoned pt's Godmother Mrs Munoz ([phone]) to discuss  discharge plan.  At this time Mrs. Munoz states she would rather pt return home with PT  services.   met with pt who also states he would rather return home.   Anticipated discharge is for Tues.    will follow up as needed.         55 year old man with a past medical history of asthma, severe COPD, DM and HTN. Pt was bought in by EMS on 2/25 with complaints of generalized weakness, SOB and chest congestion. Found to be hypoxic and hypercapnic. Was placed on BIPAP with no improvement then got intubated. Admitted for acute on chronic respiratory failure likely secondary to COPD exacerbation. Post intubation patient was very hypoxemic and hypotensive and found to have large R pneumothorax. Emergent decompression done followed by chest tube placement. Patient was on levophed as well. Also started on course of steroids for COPD exacerbation and antibiotics for possible pneumonia. Patient improved and was extubated on 3/1. Chest tube was removed on 3/2. No more pneumothorax on cxr. Patient had an episode of delirium which he was placed on Precedex drip which is now off. Patient on regular diet. On nasal cannula 2L. Patient seen by ICU attending and deemed stable for transfer to the medical floor.     On floor patient was observed  and treated  patient was tried to get weaned off oxygen but patient required oxygen to go home      Nutritional Assessment:  · Nutritional Assessment  This patient has been assessed with a concern for Malnutrition and has been determined to have a diagnosis/diagnoses of Severe protein-calorie malnutrition.    This patient is being managed with:   Diet Consistent Carbohydrate w/Evening Snack-  Entered: Mar  6 2021  2:40PM    Problem/Plan - 1:  ·  Problem: acute Respiratory failure with Hypoxia. and hypercapnia, resolved. now chronic respiratory failure with COPD requiring continuous oxygen.      Problem/Plan - 2:  ·  Problem: Pneumonia due to other aerobic gram-negative bacteria.  Plan: resolved in ICU.     Problem/Plan - 3:  ·  Problem: Severe protein-calorie malnutrition.  Plan: appreciate nutrition.     Problem/Plan - 4:  ·  Problem: Essential hypertension. controlled on current med. monitor.     Problem/Plan - 5:  ·  Problem: Type 2 diabetes mellitus without complication, unspecified whether long term insulin use.  controlled.      Problem/Plan - 6:  Problem: Pneumothorax, unspecified type. Plan: stable s/p chest tube.    Problem/Plan - 7:  ·  Problem: Chronic obstructive pulmonary disease, unspecified COPD type.  Plan: stable bronchodilators and o2       Seen and examined by me today. Vitals stable.   I have discussed all the inpatient radiographic findings with the patient and stressed that patient follows with the PCP for further outpatient care. I have printed inpatient lab results and report of imaging studies and given these to the patient/family to help with further outpatient care with PCP.   All questions welcomed and answered appropriately. Patient verbalized understanding of post discharge physician's follows up and discharge instructions.   DC time spent by me excluding billable procedures 38 mins     55 year old man with a past medical history of asthma, severe COPD, DM and HTN. Pt was bought in by EMS on 2/25 with complaints of generalized weakness, SOB and chest congestion. Found to be hypoxic and hypercapnic. Was placed on BIPAP with no improvement then got intubated. Admitted for acute on chronic respiratory failure likely secondary to COPD exacerbation. Post intubation patient was very hypoxemic and hypotensive and found to have large R pneumothorax. Emergent decompression done followed by chest tube placement. Patient was on levophed as well. Also started on course of steroids for COPD exacerbation and antibiotics for possible pneumonia. Patient improved and was extubated on 3/1. Chest tube was removed on 3/2. No more pneumothorax on cxr. Patient had an episode of delirium which he was placed on Precedex drip which is now off. Patient on regular diet. On nasal cannula 2L. Patient seen by ICU attending and deemed stable for transfer to the medical floor.     On floor patient was observed  and treated  patient was tried to get weaned off oxygen but patient required oxygen to go home      Nutritional Assessment:  · Nutritional Assessment  This patient has been assessed with a concern for Malnutrition and has been determined to have a diagnosis/diagnoses of Severe protein-calorie malnutrition.    This patient is being managed with:   Diet Consistent Carbohydrate w/Evening Snack-  Entered: Mar  6 2021  2:40PM    Problem/Plan - 1:  ·  Problem: acute Respiratory failure with Hypoxia. and hypercapnia, resolved. now chronic respiratory failure with COPD requiring continuous oxygen.  Sepsis and septic shock are ruled out.     Problem/Plan - 2:  ·  Problem: Pneumonia due to other aerobic gram-negative bacteria.  Plan: resolved in ICU.     Problem/Plan - 3:  ·  Problem: Severe protein-calorie malnutrition.  Plan: appreciate nutrition.     Problem/Plan - 4:  ·  Problem: Essential hypertension. controlled on current med. monitor.     Problem/Plan - 5:  ·  Problem: Type 2 diabetes mellitus without complication, unspecified whether long term insulin use.  controlled.      Problem/Plan - 6:  Problem: Pneumothorax, unspecified type. Plan: stable s/p chest tube.    Problem/Plan - 7:  ·  Problem: Chronic obstructive pulmonary disease, unspecified COPD type.  Plan: stable bronchodilators and o2       Seen and examined by me today. Vitals stable.   I have discussed all the inpatient radiographic findings with the patient and stressed that patient follows with the PCP for further outpatient care. I have printed inpatient lab results and report of imaging studies and given these to the patient/family to help with further outpatient care with PCP.   All questions welcomed and answered appropriately. Patient verbalized understanding of post discharge physician's follows up and discharge instructions.   DC time spent by me excluding billable procedures 38 mins

## 2021-03-09 NOTE — DISCHARGE NOTE PROVIDER - NSDCCPCAREPLAN_GEN_ALL_CORE_FT
PRINCIPAL DISCHARGE DIAGNOSIS  Diagnosis: Respiratory failure with hypercapnia, unspecified chronicity  Assessment and Plan of Treatment: resolved please use an inhaler and follow up with a doctor      SECONDARY DISCHARGE DIAGNOSES  Diagnosis: Pneumonia  Assessment and Plan of Treatment: resolvedpresumed nadya negative

## 2021-03-09 NOTE — DISCHARGE NOTE PROVIDER - DETAILS OF MALNUTRITION DIAGNOSIS/DIAGNOSES
This patient has been assessed with a concern for Malnutrition and was treated during this hospitalization for the following Nutrition diagnosis/diagnoses:     -  03/05/2021: Severe protein-calorie malnutrition   -  02/26/2021: Moderate protein-calorie malnutrition   This patient has been assessed with a concern for Malnutrition and was treated during this hospitalization for the following Nutrition diagnosis/diagnoses:     -  03/05/2021: Severe protein-calorie malnutrition   -  02/26/2021: Moderate protein-calorie malnutrition    This patient has been assessed with a concern for Malnutrition and was treated during this hospitalization for the following Nutrition diagnosis/diagnoses:     -  03/05/2021: Severe protein-calorie malnutrition   -  02/26/2021: Moderate protein-calorie malnutrition   This patient has been assessed with a concern for Malnutrition and was treated during this hospitalization for the following Nutrition diagnosis/diagnoses:     -  03/05/2021: Severe protein-calorie malnutrition   -  02/26/2021: Moderate protein-calorie malnutrition    This patient has been assessed with a concern for Malnutrition and was treated during this hospitalization for the following Nutrition diagnosis/diagnoses:     -  03/05/2021: Severe protein-calorie malnutrition   -  02/26/2021: Moderate protein-calorie malnutrition    This patient has been assessed with a concern for Malnutrition and was treated during this hospitalization for the following Nutrition diagnosis/diagnoses:     -  03/05/2021: Severe protein-calorie malnutrition   -  02/26/2021: Moderate protein-calorie malnutrition   This patient has been assessed with a concern for Malnutrition and was treated during this hospitalization for the following Nutrition diagnosis/diagnoses:     -  03/05/2021: Severe protein-calorie malnutrition   -  02/26/2021: Moderate protein-calorie malnutrition    This patient has been assessed with a concern for Malnutrition and was treated during this hospitalization for the following Nutrition diagnosis/diagnoses:     -  03/05/2021: Severe protein-calorie malnutrition   -  02/26/2021: Moderate protein-calorie malnutrition    This patient has been assessed with a concern for Malnutrition and was treated during this hospitalization for the following Nutrition diagnosis/diagnoses:     -  03/05/2021: Severe protein-calorie malnutrition   -  02/26/2021: Moderate protein-calorie malnutrition    This patient has been assessed with a concern for Malnutrition and was treated during this hospitalization for the following Nutrition diagnosis/diagnoses:     -  03/05/2021: Severe protein-calorie malnutrition   -  02/26/2021: Moderate protein-calorie malnutrition

## 2021-03-09 NOTE — PROGRESS NOTE ADULT - PROBLEM SELECTOR PLAN 2
1 L fluid bolus and watch UO  Dr Gauthier for renal consult.
resolved in ICU

## 2021-03-09 NOTE — DISCHARGE NOTE PROVIDER - NSDCMRMEDTOKEN_GEN_ALL_CORE_FT
amLODIPine 10 mg oral tablet: 1 tab(s) orally once a day  metFORMIN 500 mg oral tablet: 1 tab(s) orally 2 times a day   nicotine 21 mg/24 hr transdermal film, extended release: 1 film(s) transdermal once a day   Ventolin HFA 90 mcg/inh inhalation aerosol: 2 puff(s) inhaled every 6 hours    amLODIPine 10 mg oral tablet: 1 tab(s) orally once a day  metFORMIN 500 mg oral tablet: 1 tab(s) orally 2 times a day   nicotine 21 mg/24 hr transdermal film, extended release: 1 film(s) transdermal once a day   Symbicort 80 mcg-4.5 mcg/inh inhalation aerosol: 2 puff(s) inhaled 2 times a day   Ventolin HFA 90 mcg/inh inhalation aerosol: 2 puff(s) inhaled every 6 hours

## 2021-03-09 NOTE — PROGRESS NOTE ADULT - PROBLEM SELECTOR PROBLEM 1
Respiratory failure with hypercapnia, unspecified chronicity
Respiratory failure with hypercapnia, unspecified chronicity
Pneumonia due to infectious organism, unspecified laterality, unspecified part of lung
Respiratory failure with hypercapnia, unspecified chronicity

## 2021-03-09 NOTE — DISCHARGE NOTE PROVIDER - NSDCFUADDINST_GEN_ALL_CORE_FT
1) It is important to see your primary physician as well as other necessary consultants within the next week to perform a comprehensive medical review.  Call their offices for an appointment as soon as you leave the hospital.  If you do not have a primary physician or unable to reach your PCP, contact the [facility] Physician Referral Service at [phone].  Your medical issues appear to be stable at this time, but if your symptoms recur or worsen, contact your physicians and/or return to the hospital if necessary.  If you encounter any issues or questions with your medication, call your physicians before stopping the medication.

## 2021-03-09 NOTE — PROGRESS NOTE ADULT - PROBLEM SELECTOR PLAN 1
resolved
CT to waterseal, c/w Cef/ziithromax although U legionella neg, taper steroids to 40 Q 12 H  Reepat CXR in 6 hours with CT on water seal.  Start weaning in earnest AM tomorrow if lung up on waterseal. duonebs  DVT PPX lovenox, SCD
resolved
resolved
Keep on AC today, don't want coughing or bucking that will increase intrathoracic pressure, air leak improving, Cef/zithromax for CAP, steroids for COPD exac peak pressure in 20s on vent. levophed coming down. cx are negative to date,  C/W zithromax even thou urine  legion all neg fopr anti-infalmmatory effects given COPD
CAP- C/W Ceftraixone, ziothromax, f/u cultres  low threshold to broaden. He is doing well now has settled down ABG is ok, he retains chronically based on bicarb  Solumedrol taper  Duonebs Q4h  No weaning today.   DVT PPX lovenox  COVID PCR neg
resolved
resolved

## 2021-03-09 NOTE — PROGRESS NOTE ADULT - PROBLEM SELECTOR PROBLEM 6
Pneumothorax, unspecified type

## 2021-03-09 NOTE — PROGRESS NOTE ADULT - PROBLEM SELECTOR PLAN 7
stable bronchodilators and o2 if needed

## 2021-03-09 NOTE — PROGRESS NOTE ADULT - PROBLEM SELECTOR PROBLEM 2
Pneumonia due to infectious organism, unspecified laterality, unspecified part of lung
Pneumonia due to other aerobic gram-negative bacteria

## 2021-03-09 NOTE — DISCHARGE NOTE PROVIDER - CARE PROVIDER_API CALL
Ena Shirley)  Medicine  [address], [address]  [place] [address]  Phone: [phone]  Fax: [phone]  Follow Up Time:     Lorenzo Mosley)  Internal Medicine  [address], [address]  [place] [address]  Phone: [phone]  Fax: [phone]  Follow Up Time:

## 2021-03-09 NOTE — PROGRESS NOTE ADULT - PROBLEM SELECTOR PROBLEM 5
Type 2 diabetes mellitus without complication, unspecified whether long term insulin use

## 2021-03-10 VITALS
SYSTOLIC BLOOD PRESSURE: 136 MMHG | DIASTOLIC BLOOD PRESSURE: 84 MMHG | HEART RATE: 99 BPM | RESPIRATION RATE: 18 BRPM | TEMPERATURE: 98 F | OXYGEN SATURATION: 98 %

## 2021-03-10 LAB
CULTURE RESULTS: SIGNIFICANT CHANGE UP
CULTURE RESULTS: SIGNIFICANT CHANGE UP
GLUCOSE BLDC GLUCOMTR-MCNC: 117 MG/DL — HIGH (ref 70–99)
GLUCOSE BLDC GLUCOMTR-MCNC: 130 MG/DL — HIGH (ref 70–99)
GLUCOSE BLDC GLUCOMTR-MCNC: 179 MG/DL — HIGH (ref 70–99)
SPECIMEN SOURCE: SIGNIFICANT CHANGE UP
SPECIMEN SOURCE: SIGNIFICANT CHANGE UP

## 2021-03-10 PROCEDURE — 99239 HOSP IP/OBS DSCHRG MGMT >30: CPT

## 2021-03-10 RX ORDER — BUDESONIDE AND FORMOTEROL FUMARATE DIHYDRATE 160; 4.5 UG/1; UG/1
2 AEROSOL RESPIRATORY (INHALATION)
Qty: 1 | Refills: 0
Start: 2021-03-10

## 2021-03-10 RX ORDER — ALBUTEROL 90 UG/1
2 AEROSOL, METERED ORAL
Qty: 1 | Refills: 0
Start: 2021-03-10

## 2021-03-10 RX ORDER — AMLODIPINE BESYLATE 2.5 MG/1
1 TABLET ORAL
Qty: 30 | Refills: 0
Start: 2021-03-10 | End: 2021-04-08

## 2021-03-10 RX ORDER — NICOTINE POLACRILEX 2 MG
1 GUM BUCCAL
Qty: 30 | Refills: 0
Start: 2021-03-10 | End: 2021-04-08

## 2021-03-10 RX ORDER — METFORMIN HYDROCHLORIDE 850 MG/1
1 TABLET ORAL
Qty: 60 | Refills: 0
Start: 2021-03-10 | End: 2021-04-08

## 2021-03-10 RX ADMIN — Medication 2: at 11:24

## 2021-03-10 RX ADMIN — Medication 1 PATCH: at 07:58

## 2021-03-10 RX ADMIN — Medication 3 MILLILITER(S): at 05:36

## 2021-03-10 RX ADMIN — POLYETHYLENE GLYCOL 3350 17 GRAM(S): 17 POWDER, FOR SOLUTION ORAL at 12:34

## 2021-03-10 RX ADMIN — Medication 1 PATCH: at 12:43

## 2021-03-10 RX ADMIN — Medication 3 MILLILITER(S): at 00:45

## 2021-03-10 RX ADMIN — SODIUM CHLORIDE 3 MILLILITER(S): 9 INJECTION INTRAMUSCULAR; INTRAVENOUS; SUBCUTANEOUS at 05:37

## 2021-03-10 RX ADMIN — Medication 3 MILLILITER(S): at 13:15

## 2021-03-10 RX ADMIN — ENOXAPARIN SODIUM 40 MILLIGRAM(S): 100 INJECTION SUBCUTANEOUS at 12:35

## 2021-03-10 RX ADMIN — SODIUM CHLORIDE 3 MILLILITER(S): 9 INJECTION INTRAMUSCULAR; INTRAVENOUS; SUBCUTANEOUS at 00:46

## 2021-03-10 NOTE — PROGRESS NOTE ADULT - SUBJECTIVE AND OBJECTIVE BOX
24 hr events:  weaned and extubated this afternoon  off levophed since yesterday  chest tube on water seal    ## ROS:  [ ] unable to obtain  CONSTITUTIONAL: No fever, weight loss, or fatigue  EYES: No eye pain, visual disturbances, or discharge  ENMT:  No difficulty hearing, tinnitus, vertigo; No sinus or throat pain  NECK: No pain or stiffness  RESPIRATORY: No cough, wheezing, chills or hemoptysis; No shortness of breath  CARDIOVASCULAR: No chest pain, palpitations, dizziness, or leg swelling  GASTROINTESTINAL: No abdominal or epigastric pain. No nausea, vomiting, or hematemesis; No diarrhea or constipation. No melena or hematochezia.  GENITOURINARY: No dysuria, frequency, hematuria, or incontinence  NEUROLOGICAL: No headaches, memory loss, loss of strength, numbness, or tremors  SKIN: No itching, burning, rashes, or lesions   LYMPH NODES: No enlarged glands  ENDOCRINE: No heat or cold intolerance; No hair loss  MUSCULOSKELETAL: No joint pain or swelling; No muscle, back, or extremity pain  PSYCHIATRIC: No depression, anxiety, mood swings, or difficulty sleeping  HEME/LYMPH: No easy bruising, or bleeding gums  ALLERGY AND IMMUNOLOGIC: No hives or eczema    ## Labs:  CBC:                        11.7   17.48 )-----------( 219      ( 01 Mar 2021 03:47 )             36.9     Chem:  03-01    144  |  106  |  31<H>  ----------------------------<  188<H>  4.1   |  32<H>  |  1.10    Ca    8.2<L>      01 Mar 2021 03:47  Phos  2.2     03-01  Mg     2.7     03-01    TPro  6.0  /  Alb  2.3<L>  /  TBili  0.3  /  DBili  x   /  AST  24  /  ALT  27  /  AlkPhos  51  03-01    Coags:          ## Imaging:    ## Medications:  azithromycin  IVPB      azithromycin  IVPB 500 milliGRAM(s) IV Intermittent every 24 hours  cefTRIAXone   IVPB 1000 milliGRAM(s) IV Intermittent every 24 hours      ALBUTerol    0.083% 2.5 milliGRAM(s) Nebulizer every 4 hours PRN  albuterol/ipratropium for Nebulization 3 milliLiter(s) Nebulizer every 6 hours  sodium chloride 3%  Inhalation 3 milliLiter(s) Inhalation every 6 hours    dextrose 40% Gel 15 Gram(s) Oral once  dextrose 50% Injectable 25 Gram(s) IV Push once  dextrose 50% Injectable 12.5 Gram(s) IV Push once  dextrose 50% Injectable 25 Gram(s) IV Push once  glucagon  Injectable 1 milliGRAM(s) IntraMuscular once  insulin glargine Injectable (LANTUS) 7 Unit(s) SubCutaneous at bedtime  insulin lispro (ADMELOG) corrective regimen sliding scale   SubCutaneous every 6 hours    enoxaparin Injectable 40 milliGRAM(s) SubCutaneous daily    pantoprazole  Injectable 40 milliGRAM(s) IV Push daily  polyethylene glycol 3350 17 Gram(s) Oral daily  senna 2 Tablet(s) Oral at bedtime    dexMEDEtomidine Infusion 0.2 MICROgram(s)/kG/Hr IV Continuous <Continuous>  fentaNYL   Infusion. 0.5 MICROgram(s)/kG/Hr IV Continuous <Continuous>  propofol Infusion 5 MICROgram(s)/kG/Min IV Continuous <Continuous>      ## Vitals:  T(C): 37.7 (03-01-21 @ 19:21), Max: 37.7 (03-01-21 @ 11:00)  HR: 63 (03-01-21 @ 21:03) (58 - 123)  BP: 153/89 (03-01-21 @ 21:03) (124/70 - 197/113)  BP(mean): 107 (03-01-21 @ 21:03) (86 - 134)  RR: 22 (03-01-21 @ 21:03) (15 - 30)  SpO2: 94% (03-01-21 @ 21:03) (87% - 98%)  Wt(kg): --  Vent: Mode: CPAP with PS, RR (patient): 28, FiO2: 40, PEEP: 5, PS: 5  ABG: ABG - ( 01 Mar 2021 08:39 )  pH, Arterial: x     pH, Blood: 7.37  /  pCO2: 58    /  pO2: 79    / HCO3: 33    / Base Excess: 6.5   /  SaO2: 94                    02-28 @ 07:01  -  03-01 @ 07:00  --------------------------------------------------------  IN: 2481.2 mL / OUT: 3225 mL / NET: -743.8 mL    03-01 @ 07:01  - 03-01 @ 22:01  --------------------------------------------------------  IN: 624.3 mL / OUT: 1305 mL / NET: -680.7 mL          ## P/E:  Gen: lying comfortably in bed in no apparent distress  HEENT: PERRL, EOMI  Resp: CTA B/L no c/r/w  CVS: S1S2 no m/r/g  Abd: soft NT/ND +BS  Ext: no c/c/e  Neuro: A&Ox3    CENTRAL LINE: [ ] YES [ ] NO  LOCATION:   DATE INSERTED:  REMOVE: [ ] YES [ ] NO      LEIGH: [ ] YES [ ] NO    DATE INSERTED:  REMOVE:  [ ] YES [ ] NO      A-LINE:  [ ] YES [ ] NO  LOCATION:   DATE INSERTED:  REMOVE:  [ ] YES [ ] NO  EXPLAIN:    GLOBAL ISSUE/BEST PRACTICE:  Analgesia:  Sedation:  HOB elevation: yes  Stress ulcer prophylaxis:  VTE prophylaxis:  Oral Care:  Glycemic control:  Nutrition:    CODE STATUS: [ ] full code  [ ] DNR  [ ] DNI  [ ] [facility]  Goals of care discussion: [ ] yes 
[facility] NEPHROLOGY SERVICES, [facility]  NEPHROLOGY AND HYPERTENSION  [address]  [address]  [place] [address]  [phone]    MD PETAR PENA MD ANDREY GONCHARUK, MD MADHU KORRAPATI, MD YELENA ROSENBERG, MD BINNY KOSHY, MD CHRISTOPHER CAPUTO, MD EDWARD BOVER, MD          Patient events noted no distress    MEDICATIONS  (STANDING):  albuterol/ipratropium for Nebulization 3 milliLiter(s) Nebulizer every 6 hours  cefTRIAXone   IVPB 1000 milliGRAM(s) IV Intermittent every 24 hours  chlorhexidine 4% Liquid 1 Application(s) Topical <User Schedule>  dexMEDEtomidine Infusion 0.2 MICROgram(s)/kG/Hr (2.56 mL/Hr) IV Continuous <Continuous>  dextrose 40% Gel 15 Gram(s) Oral once  dextrose 5%. 1000 milliLiter(s) (100 mL/Hr) IV Continuous <Continuous>  dextrose 5%. 1000 milliLiter(s) (50 mL/Hr) IV Continuous <Continuous>  dextrose 50% Injectable 12.5 Gram(s) IV Push once  dextrose 50% Injectable 25 Gram(s) IV Push once  dextrose 50% Injectable 25 Gram(s) IV Push once  enoxaparin Injectable 40 milliGRAM(s) SubCutaneous daily  glucagon  Injectable 1 milliGRAM(s) IntraMuscular once  insulin lispro (ADMELOG) corrective regimen sliding scale   SubCutaneous every 6 hours  methylPREDNISolone sodium succinate Injectable 20 milliGRAM(s) IV Push daily  nicotine - 21 mG/24Hr(s) Patch 1 patch Transdermal daily  pantoprazole  Injectable 40 milliGRAM(s) IV Push daily  polyethylene glycol 3350 17 Gram(s) Oral daily  senna 2 Tablet(s) Oral at bedtime  sodium chloride 3%  Inhalation 3 milliLiter(s) Inhalation every 6 hours    MEDICATIONS  (PRN):  ALBUTerol    0.083% 2.5 milliGRAM(s) Nebulizer every 4 hours PRN Shortness of Breath and/or Wheezing  sodium chloride 0.9% lock flush 10 milliLiter(s) IV Push every 1 hour PRN Pre/post blood products, medications, blood draw, and to maintain line patency      03-02-21 @ 07:01  -  03-03-21 @ 07:00  --------------------------------------------------------  IN: 1779.1 mL / OUT: 1056 mL / NET: 723.1 mL    03-03-21 @ 07:01  -  03-03-21 @ 22:05  --------------------------------------------------------  IN: 597.4 mL / OUT: 831 mL / NET: -233.6 mL      PHYSICAL EXAM:      T(C): 37.2 (03-03-21 @ 19:18), Max: 38.3 (03-03-21 @ 12:00)  HR: 59 (03-03-21 @ 21:15) (48 - 91)  BP: 150/92 (03-03-21 @ 21:00) (137/75 - 170/89)  RR: 24 (03-03-21 @ 21:15) (18 - 32)  SpO2: 100% (03-03-21 @ 21:15) (86% - 100%)  Wt(kg): --  Lungs scattered rhonchi  Heart S1S2  Abd soft NT ND  Extremities:   tr edema                                    11.0   12.42 )-----------( 212      ( 03 Mar 2021 01:12 )             34.3     03-03    146<H>  |  104  |  28<H>  ----------------------------<  125<H>  3.3<L>   |  37<H>  |  0.81    Ca    8.0<L>      03 Mar 2021 01:12  Phos  2.7     03-03  Mg     2.0     03-03    TPro  6.0  /  Alb  2.5<L>  /  TBili  0.6  /  DBili  x   /  AST  23  /  ALT  26  /  AlkPhos  49  03-03    ABG - ( 02 Mar 2021 22:42 )  pH, Arterial: x     pH, Blood: 7.41  /  pCO2: 64    /  pO2: 59    / HCO3: 40    / Base Excess: 13.0  /  SaO2: 89                LIVER FUNCTIONS - ( 03 Mar 2021 01:12 )  Alb: 2.5 g/dL / Pro: 6.0 gm/dL / ALK PHOS: 49 U/L / ALT: 26 U/L / AST: 23 U/L / GGT: x           Creatinine Trend: 0.81<--, 0.92<--, 1.10<--, 1.23<--, 2.00<--, 2.18<--      Assessment   KATINA suspected pre renal azotemia, risk for ischemic ATN; PNA septic shock  Stabilizing   Metabolic alkalosis, post hypercapnic, exacerbated by hypokalemia     Plan      Continue supportive care IV abx   Will follow course.        Grzegorz Gauthier MD
24 hr events:  extubated 2 days ago 3/1  chest tube d/angela 1 day ago 3/2  overnight with delirium, confusion/agitation: precedex drip increased to 1.5mcg  had hypoxic event O2 sat int he 80s overnight during episode of agitation placed on a ventimask  off lantus since yesterday due to hypoglycemia and has been on D5 gtt, blood sugar improved and D5 d/angela today    ## ROS:  [ ] unable to obtain  CONSTITUTIONAL: No fever, weight loss, or fatigue  EYES: No eye pain, visual disturbances, or discharge  ENMT:  No difficulty hearing, tinnitus, vertigo; No sinus or throat pain  NECK: No pain or stiffness  RESPIRATORY: No cough, wheezing, chills or hemoptysis; No shortness of breath  CARDIOVASCULAR: No chest pain, palpitations, dizziness, or leg swelling  GASTROINTESTINAL: No abdominal or epigastric pain. No nausea, vomiting, or hematemesis; No diarrhea or constipation. No melena or hematochezia.  GENITOURINARY: No dysuria, frequency, hematuria, or incontinence  NEUROLOGICAL: No headaches, memory loss, loss of strength, numbness, or tremors  SKIN: No itching, burning, rashes, or lesions   LYMPH NODES: No enlarged glands  ENDOCRINE: No heat or cold intolerance; No hair loss  MUSCULOSKELETAL: No joint pain or swelling; No muscle, back, or extremity pain  PSYCHIATRIC: No depression, anxiety, mood swings, or difficulty sleeping  HEME/LYMPH: No easy bruising, or bleeding gums  ALLERGY AND IMMUNOLOGIC: No hives or eczema    ## Labs:  CBC:                        11.0   12.42 )-----------( 212      ( 03 Mar 2021 01:12 )             34.3     Chem:  03-03    146<H>  |  104  |  28<H>  ----------------------------<  125<H>  3.3<L>   |  37<H>  |  0.81    Ca    8.0<L>      03 Mar 2021 01:12  Phos  2.7     03-03  Mg     2.0     03-03    TPro  6.0  /  Alb  2.5<L>  /  TBili  0.6  /  DBili  x   /  AST  23  /  ALT  26  /  AlkPhos  49  03-03    Coags:          ## Imaging:    ## Medications:  cefTRIAXone   IVPB 1000 milliGRAM(s) IV Intermittent every 24 hours      ALBUTerol    0.083% 2.5 milliGRAM(s) Nebulizer every 4 hours PRN  albuterol/ipratropium for Nebulization 3 milliLiter(s) Nebulizer every 6 hours  sodium chloride 3%  Inhalation 3 milliLiter(s) Inhalation every 6 hours    dextrose 40% Gel 15 Gram(s) Oral once  dextrose 50% Injectable 25 Gram(s) IV Push once  dextrose 50% Injectable 12.5 Gram(s) IV Push once  dextrose 50% Injectable 25 Gram(s) IV Push once  glucagon  Injectable 1 milliGRAM(s) IntraMuscular once  insulin lispro (ADMELOG) corrective regimen sliding scale   SubCutaneous every 6 hours  methylPREDNISolone sodium succinate Injectable 20 milliGRAM(s) IV Push daily    enoxaparin Injectable 40 milliGRAM(s) SubCutaneous daily    pantoprazole  Injectable 40 milliGRAM(s) IV Push daily  polyethylene glycol 3350 17 Gram(s) Oral daily  senna 2 Tablet(s) Oral at bedtime    dexMEDEtomidine Infusion 0.2 MICROgram(s)/kG/Hr IV Continuous <Continuous>      ## Vitals:  T(C): 37.2 (03-03-21 @ 19:18), Max: 38.3 (03-03-21 @ 12:00)  HR: 59 (03-03-21 @ 21:15) (48 - 91)  BP: 150/92 (03-03-21 @ 21:00) (137/75 - 170/89)  BP(mean): 109 (03-03-21 @ 21:00) (90 - 114)  RR: 24 (03-03-21 @ 21:15) (18 - 34)  SpO2: 100% (03-03-21 @ 21:15) (86% - 100%)  Wt(kg): --  Vent:   ABG: ABG - ( 02 Mar 2021 22:42 )  pH, Arterial: x     pH, Blood: 7.41  /  pCO2: 64    /  pO2: 59    / HCO3: 40    / Base Excess: 13.0  /  SaO2: 89                    03-02 @ 07:01  -  03-03 @ 07:00  --------------------------------------------------------  IN: 1779.1 mL / OUT: 1056 mL / NET: 723.1 mL    03-03 @ 07:01  - 03-03 @ 21:55  --------------------------------------------------------  IN: 597.4 mL / OUT: 831 mL / NET: -233.6 mL          ## P/E:  Gen: lying comfortably in bed in no apparent distress  HEENT: PERRL, EOMI  Resp: CTA B/L no c/r/w  CVS: S1S2 no m/r/g  Abd: soft NT/ND +BS  Ext: no c/c/e  Neuro: A&Ox3    CENTRAL LINE: [ ] YES [ ] NO  LOCATION:   DATE INSERTED:  REMOVE: [ ] YES [ ] NO      LEIGH: [ ] YES [ ] NO    DATE INSERTED:  REMOVE:  [ ] YES [ ] NO      A-LINE:  [ ] YES [ ] NO  LOCATION:   DATE INSERTED:  REMOVE:  [ ] YES [ ] NO  EXPLAIN:    GLOBAL ISSUE/BEST PRACTICE:  Analgesia:  Sedation:  HOB elevation: yes  Stress ulcer prophylaxis:  VTE prophylaxis:  Oral Care:  Glycemic control:  Nutrition:    CODE STATUS: [ ] full code  [ ] DNR  [ ] DNI  [ ] MOLST  Goals of care discussion: [ ] yes 
24 hr events:  extubated yesterday  chest tube clamped this morning, no PTX, chest tube d/angela this afternoon  agitated overnight, placed on precedex drip  weaning down precedex drip today    ## ROS:  [ ] unable to obtain  CONSTITUTIONAL: No fever, weight loss, or fatigue  EYES: No eye pain, visual disturbances, or discharge  ENMT:  No difficulty hearing, tinnitus, vertigo; No sinus or throat pain  NECK: No pain or stiffness  RESPIRATORY: No cough, wheezing, chills or hemoptysis; No shortness of breath  CARDIOVASCULAR: No chest pain, palpitations, dizziness, or leg swelling  GASTROINTESTINAL: No abdominal or epigastric pain. No nausea, vomiting, or hematemesis; No diarrhea or constipation. No melena or hematochezia.  GENITOURINARY: No dysuria, frequency, hematuria, or incontinence  NEUROLOGICAL: No headaches, memory loss, loss of strength, numbness, or tremors  SKIN: No itching, burning, rashes, or lesions   LYMPH NODES: No enlarged glands  ENDOCRINE: No heat or cold intolerance; No hair loss  MUSCULOSKELETAL: No joint pain or swelling; No muscle, back, or extremity pain  PSYCHIATRIC: No depression, anxiety, mood swings, or difficulty sleeping  HEME/LYMPH: No easy bruising, or bleeding gums  ALLERGY AND IMMUNOLOGIC: No hives or eczema    ## Labs:  CBC:                        11.2   15.25 )-----------( 188      ( 02 Mar 2021 04:30 )             34.8     Chem:  03-02    145  |  104  |  32<H>  ----------------------------<  112<H>  3.7   |  36<H>  |  0.92    Ca    8.3<L>      02 Mar 2021 04:30  Phos  3.5     03-02  Mg     2.6     03-02    TPro  5.9<L>  /  Alb  2.5<L>  /  TBili  0.4  /  DBili  x   /  AST  17  /  ALT  31  /  AlkPhos  45  03-02    Coags:          ## Imaging:    ## Medications:  cefTRIAXone   IVPB 1000 milliGRAM(s) IV Intermittent every 24 hours      ALBUTerol    0.083% 2.5 milliGRAM(s) Nebulizer every 4 hours PRN  albuterol/ipratropium for Nebulization 3 milliLiter(s) Nebulizer every 6 hours  sodium chloride 3%  Inhalation 3 milliLiter(s) Inhalation every 6 hours    dextrose 40% Gel 15 Gram(s) Oral once  dextrose 50% Injectable 25 Gram(s) IV Push once  dextrose 50% Injectable 12.5 Gram(s) IV Push once  dextrose 50% Injectable 25 Gram(s) IV Push once  glucagon  Injectable 1 milliGRAM(s) IntraMuscular once  insulin lispro (ADMELOG) corrective regimen sliding scale   SubCutaneous every 6 hours    enoxaparin Injectable 40 milliGRAM(s) SubCutaneous daily    pantoprazole  Injectable 40 milliGRAM(s) IV Push daily  polyethylene glycol 3350 17 Gram(s) Oral daily  senna 2 Tablet(s) Oral at bedtime    dexMEDEtomidine Infusion 0.2 MICROgram(s)/kG/Hr IV Continuous <Continuous>      ## Vitals:  T(C): 37.9 (03-02-21 @ 16:20), Max: 37.9 (03-02-21 @ 16:20)  HR: 65 (03-02-21 @ 20:00) (46 - 90)  BP: 132/78 (03-02-21 @ 19:00) (131/73 - 186/121)  BP(mean): 93 (03-02-21 @ 19:00) (89 - 140)  RR: 28 (03-02-21 @ 20:00) (13 - 31)  SpO2: 93% (03-02-21 @ 20:00) (88% - 100%)  Wt(kg): --  Vent:   ABG: ABG - ( 01 Mar 2021 08:39 )  pH, Arterial: x     pH, Blood: 7.37  /  pCO2: 58    /  pO2: 79    / HCO3: 33    / Base Excess: 6.5   /  SaO2: 94                    03-01 @ 07:01  -  03-02 @ 07:00  --------------------------------------------------------  IN: 1112.1 mL / OUT: 2805 mL / NET: -1692.9 mL    03-02 @ 07:01  -  03-02 @ 20:14  --------------------------------------------------------  IN: 732.8 mL / OUT: 300 mL / NET: 432.8 mL          ## P/E:  Gen: lying comfortably in bed in no apparent distress  HEENT: PERRL, EOMI  Resp: CTA B/L no c/r/w  CVS: S1S2 no m/r/g  Abd: soft NT/ND +BS  Ext: no c/c/e  Neuro: A&Ox3    CENTRAL LINE: [ ] YES [ ] NO  LOCATION:   DATE INSERTED:  REMOVE: [ ] YES [ ] NO      ABRAHAM: [ ] YES [ ] NO    DATE INSERTED:  REMOVE:  [ ] YES [ ] NO      A-LINE:  [ ] YES [ ] NO  LOCATION:   DATE INSERTED:  REMOVE:  [ ] YES [ ] NO  EXPLAIN:    GLOBAL ISSUE/BEST PRACTICE:  Analgesia:  Sedation:  HOB elevation: yes  Stress ulcer prophylaxis:  VTE prophylaxis:  Oral Care:  Glycemic control:  Nutrition:    CODE STATUS: [ ] full code  [ ] DNR  [ ] DNI  [ ] MOLST  Goals of care discussion: [ ] yes 
[facility] NEPHROLOGY SERVICES, [facility]  NEPHROLOGY AND HYPERTENSION  [address]  [address]  [place] [address]  [phone]    MD PETAR PENA MD ANDREY GONCHARUK, MD MADHU KORRAPATI, MD YELENA ROSENBERG, MD BINNY KOSHY, MD CHRISTOPHER CAPUTO, MD EDWARD BOVER, MD          Patient events noted    MEDICATIONS  (STANDING):  albuterol/ipratropium for Nebulization 3 milliLiter(s) Nebulizer every 6 hours  azithromycin  IVPB      azithromycin  IVPB 500 milliGRAM(s) IV Intermittent every 24 hours  cefTRIAXone   IVPB 1000 milliGRAM(s) IV Intermittent every 24 hours  chlorhexidine 4% Liquid 1 Application(s) Topical <User Schedule>  dexMEDEtomidine Infusion 0.2 MICROgram(s)/kG/Hr (2.56 mL/Hr) IV Continuous <Continuous>  dextrose 40% Gel 15 Gram(s) Oral once  dextrose 5%. 1000 milliLiter(s) (50 mL/Hr) IV Continuous <Continuous>  dextrose 5%. 1000 milliLiter(s) (100 mL/Hr) IV Continuous <Continuous>  dextrose 50% Injectable 25 Gram(s) IV Push once  dextrose 50% Injectable 12.5 Gram(s) IV Push once  dextrose 50% Injectable 25 Gram(s) IV Push once  enoxaparin Injectable 40 milliGRAM(s) SubCutaneous daily  fentaNYL   Infusion. 0.5 MICROgram(s)/kG/Hr (3.86 mL/Hr) IV Continuous <Continuous>  glucagon  Injectable 1 milliGRAM(s) IntraMuscular once  insulin glargine Injectable (LANTUS) 7 Unit(s) SubCutaneous at bedtime  insulin lispro (ADMELOG) corrective regimen sliding scale   SubCutaneous every 6 hours  nicotine -   7 mG/24Hr(s) Patch 1 patch Transdermal daily  pantoprazole  Injectable 40 milliGRAM(s) IV Push daily  polyethylene glycol 3350 17 Gram(s) Oral daily  propofol Infusion 5 MICROgram(s)/kG/Min (2.31 mL/Hr) IV Continuous <Continuous>  senna 2 Tablet(s) Oral at bedtime  sodium chloride 3%  Inhalation 3 milliLiter(s) Inhalation every 6 hours    MEDICATIONS  (PRN):  ALBUTerol    0.083% 2.5 milliGRAM(s) Nebulizer every 4 hours PRN Shortness of Breath and/or Wheezing  sodium chloride 0.9% lock flush 10 milliLiter(s) IV Push every 1 hour PRN Pre/post blood products, medications, blood draw, and to maintain line patency      02-28-21 @ 07:01  -  03-01-21 @ 07:00  --------------------------------------------------------  IN: 2481.2 mL / OUT: 3225 mL / NET: -743.8 mL    03-01-21 @ 07:01  -  03-01-21 @ 20:49  --------------------------------------------------------  IN: 585.9 mL / OUT: 305 mL / NET: 280.9 mL      PHYSICAL EXAM:      T(C): 37.7 (03-01-21 @ 19:21), Max: 37.7 (03-01-21 @ 11:00)  HR: 91 (03-01-21 @ 20:00) (58 - 123)  BP: 171/90 (03-01-21 @ 20:00) (112/67 - 197/113)  RR: 26 (03-01-21 @ 20:00) (15 - 30)  SpO2: 90% (03-01-21 @ 20:00) (87% - 98%)  Wt(kg): --  Lungs clear  Heart S1S2  Abd soft NT ND  Extremities:   tr edema                                    11.7   17.48 )-----------( 219      ( 01 Mar 2021 03:47 )             36.9     03-01    144  |  106  |  31<H>  ----------------------------<  188<H>  4.1   |  32<H>  |  1.10    Ca    8.2<L>      01 Mar 2021 03:47  Phos  2.2     03-01  Mg     2.7     03-01    TPro  6.0  /  Alb  2.3<L>  /  TBili  0.3  /  DBili  x   /  AST  24  /  ALT  27  /  AlkPhos  51  03-01    ABG - ( 01 Mar 2021 08:39 )  pH, Arterial: x     pH, Blood: 7.37  /  pCO2: 58    /  pO2: 79    / HCO3: 33    / Base Excess: 6.5   /  SaO2: 94                LIVER FUNCTIONS - ( 01 Mar 2021 03:47 )  Alb: 2.3 g/dL / Pro: 6.0 gm/dL / ALK PHOS: 51 U/L / ALT: 27 U/L / AST: 24 U/L / GGT: x           Creatinine Trend: 1.10<--, 1.23<--, 2.00<--, 2.18<--, 1.66<--, 0.72<--  Mode: CPAP with PS  FiO2: 40  PEEP: 5  PS: 5    Assessment   KATINA suspected pre renal azotemia, risk for ischemic ATN; PNA septic shock  Stabilizing     Plan    Continue supportive care IV abx and pressor support  Will follow course.            Grzegorz Gauthier MD
[facility] NEPHROLOGY SERVICES, [facility]  NEPHROLOGY AND HYPERTENSION  [address]  [address]  [place] [address]  [phone]    MD PETAR PENA MD ANDREY GONCHARUK, MD MADHU KORRAPATI, MD YELENA ROSENBERG, MD BINNY KOSHY, MD CHRISTOPHER CAPUTO, MD EDWARD BOVER, MD          Patient events noted extubated confused     MEDICATIONS  (STANDING):  albuterol/ipratropium for Nebulization 3 milliLiter(s) Nebulizer every 6 hours  cefTRIAXone   IVPB 1000 milliGRAM(s) IV Intermittent every 24 hours  chlorhexidine 4% Liquid 1 Application(s) Topical <User Schedule>  dexMEDEtomidine Infusion 0.2 MICROgram(s)/kG/Hr (2.56 mL/Hr) IV Continuous <Continuous>  dextrose 40% Gel 15 Gram(s) Oral once  dextrose 5%. 1000 milliLiter(s) (50 mL/Hr) IV Continuous <Continuous>  dextrose 5%. 1000 milliLiter(s) (100 mL/Hr) IV Continuous <Continuous>  dextrose 5%. 1000 milliLiter(s) (75 mL/Hr) IV Continuous <Continuous>  dextrose 50% Injectable 25 Gram(s) IV Push once  dextrose 50% Injectable 12.5 Gram(s) IV Push once  dextrose 50% Injectable 25 Gram(s) IV Push once  enoxaparin Injectable 40 milliGRAM(s) SubCutaneous daily  glucagon  Injectable 1 milliGRAM(s) IntraMuscular once  insulin lispro (ADMELOG) corrective regimen sliding scale   SubCutaneous every 6 hours  nicotine -   7 mG/24Hr(s) Patch 1 patch Transdermal daily  pantoprazole  Injectable 40 milliGRAM(s) IV Push daily  polyethylene glycol 3350 17 Gram(s) Oral daily  senna 2 Tablet(s) Oral at bedtime  sodium chloride 3%  Inhalation 3 milliLiter(s) Inhalation every 6 hours    MEDICATIONS  (PRN):  ALBUTerol    0.083% 2.5 milliGRAM(s) Nebulizer every 4 hours PRN Shortness of Breath and/or Wheezing  sodium chloride 0.9% lock flush 10 milliLiter(s) IV Push every 1 hour PRN Pre/post blood products, medications, blood draw, and to maintain line patency      03-01-21 @ 07:01  -  03-02-21 @ 07:00  --------------------------------------------------------  IN: 1112.1 mL / OUT: 2805 mL / NET: -1692.9 mL    03-02-21 @ 07:01  -  03-02-21 @ 17:00  --------------------------------------------------------  IN: 492.5 mL / OUT: 0 mL / NET: 492.5 mL      PHYSICAL EXAM:      T(C): 37.9 (03-02-21 @ 16:20), Max: 37.9 (03-02-21 @ 16:20)  HR: 66 (03-02-21 @ 16:49) (46 - 117)  BP: 137/76 (03-02-21 @ 16:00) (131/73 - 186/121)  RR: 13 (03-02-21 @ 16:00) (13 - 31)  SpO2: 92% (03-02-21 @ 16:49) (87% - 100%)  Wt(kg): --  Lungs anterior rhonchi  Heart S1S2  Abd soft NT ND  Extremities:   tr edema                                    11.2   15.25 )-----------( 188      ( 02 Mar 2021 04:30 )             34.8     03-02    145  |  104  |  32<H>  ----------------------------<  112<H>  3.7   |  36<H>  |  0.92    Ca    8.3<L>      02 Mar 2021 04:30  Phos  3.5     03-02  Mg     2.6     03-02    TPro  5.9<L>  /  Alb  2.5<L>  /  TBili  0.4  /  DBili  x   /  AST  17  /  ALT  31  /  AlkPhos  45  03-02    ABG - ( 01 Mar 2021 08:39 )  pH, Arterial: x     pH, Blood: 7.37  /  pCO2: 58    /  pO2: 79    / HCO3: 33    / Base Excess: 6.5   /  SaO2: 94                LIVER FUNCTIONS - ( 02 Mar 2021 04:30 )  Alb: 2.5 g/dL / Pro: 5.9 gm/dL / ALK PHOS: 45 U/L / ALT: 31 U/L / AST: 17 U/L / GGT: x           Creatinine Trend: 0.92<--, 1.10<--, 1.23<--, 2.00<--, 2.18<--, 1.66<--    \      Assessment   KATINA suspected pre renal azotemia, risk for ischemic ATN; PNA septic shock  Stabilizing     Plan    Continue supportive care IV abx and pressor support  Will follow course.    Grzegorz Gauthier MD
[facility], [facility]  NEPHROLOGY AND HYPERTENSION  [address]  [address]  [place] [address]  [phone]    MD PETAR PENA MD ANDREY GONCHARUK, MD MADHU KORRAPATI, MD YELENA ROSENBERG, MD BINNY KOSHY, MD CHRISTOPHER CAPUTO, MD EDWARD BOVER, MD          Patient events noted    MEDICATIONS  (STANDING):  albuterol/ipratropium for Nebulization 3 milliLiter(s) Nebulizer every 6 hours  azithromycin  IVPB 500 milliGRAM(s) IV Intermittent every 24 hours  azithromycin  IVPB      cefTRIAXone   IVPB 1000 milliGRAM(s) IV Intermittent every 24 hours  chlorhexidine 4% Liquid 1 Application(s) Topical <User Schedule>  dexMEDEtomidine Infusion 0.2 MICROgram(s)/kG/Hr (2.56 mL/Hr) IV Continuous <Continuous>  dextrose 40% Gel 15 Gram(s) Oral once  dextrose 5%. 1000 milliLiter(s) (50 mL/Hr) IV Continuous <Continuous>  dextrose 5%. 1000 milliLiter(s) (100 mL/Hr) IV Continuous <Continuous>  dextrose 50% Injectable 12.5 Gram(s) IV Push once  dextrose 50% Injectable 25 Gram(s) IV Push once  dextrose 50% Injectable 25 Gram(s) IV Push once  enoxaparin Injectable 40 milliGRAM(s) SubCutaneous daily  fentaNYL   Infusion. 0.5 MICROgram(s)/kG/Hr (3.86 mL/Hr) IV Continuous <Continuous>  glucagon  Injectable 1 milliGRAM(s) IntraMuscular once  insulin glargine Injectable (LANTUS) 7 Unit(s) SubCutaneous at bedtime  insulin lispro (ADMELOG) corrective regimen sliding scale   SubCutaneous every 6 hours  methylPREDNISolone sodium succinate Injectable 40 milliGRAM(s) IV Push every 12 hours  nicotine -   7 mG/24Hr(s) Patch 1 patch Transdermal daily  norepinephrine Infusion 0.05 MICROgram(s)/kG/Min (7.23 mL/Hr) IV Continuous <Continuous>  pantoprazole  Injectable 40 milliGRAM(s) IV Push daily  polyethylene glycol 3350 17 Gram(s) Oral daily  propofol Infusion 5 MICROgram(s)/kG/Min (2.31 mL/Hr) IV Continuous <Continuous>  senna 2 Tablet(s) Oral at bedtime  sodium chloride 3%  Inhalation 3 milliLiter(s) Inhalation every 6 hours    MEDICATIONS  (PRN):  ALBUTerol    0.083% 2.5 milliGRAM(s) Nebulizer every 4 hours PRN Shortness of Breath and/or Wheezing  sodium chloride 0.9% lock flush 10 milliLiter(s) IV Push every 1 hour PRN Pre/post blood products, medications, blood draw, and to maintain line patency      02-27-21 @ 07:01  -  02-28-21 @ 07:00  --------------------------------------------------------  IN: 4743.1 mL / OUT: 1955 mL / NET: 2788.1 mL    02-28-21 @ 07:01  -  02-28-21 @ 21:52  --------------------------------------------------------  IN: 1654.5 mL / OUT: 1475 mL / NET: 179.5 mL      PHYSICAL EXAM:      T(C): 37.1 (02-28-21 @ 19:30), Max: 37.1 (02-28-21 @ 07:30)  HR: 96 (02-28-21 @ 21:00) (51 - 112)  BP: 152/87 (02-28-21 @ 21:00) (94/60 - 163/94)  RR: 23 (02-28-21 @ 21:00) (18 - 24)  SpO2: 91% (02-28-21 @ 21:00) (89% - 96%)  Wt(kg): --  Lungs clear decreased BS left  Heart S1S2  Abd soft NT ND  Extremities:   tr edema                                    11.3   13.78 )-----------( 179      ( 28 Feb 2021 04:18 )             34.9     02-28    144  |  111<H>  |  39<H>  ----------------------------<  293<H>  4.3   |  29  |  1.23    Ca    6.9<L>      28 Feb 2021 04:18  Phos  2.6     02-28  Mg     2.5     02-28    TPro  5.0<L>  /  Alb  2.0<L>  /  TBili  0.1<L>  /  DBili  x   /  AST  13<L>  /  ALT  21  /  AlkPhos  39<L>  02-28    ABG - ( 28 Feb 2021 09:09 )  pH, Arterial: x     pH, Blood: 7.34  /  pCO2: 56    /  pO2: 65    / HCO3: 30    / Base Excess: 3.5   /  SaO2: 90                LIVER FUNCTIONS - ( 28 Feb 2021 04:18 )  Alb: 2.0 g/dL / Pro: 5.0 gm/dL / ALK PHOS: 39 U/L / ALT: 21 U/L / AST: 13 U/L / GGT: x           Creatinine Trend: 1.23<--, 2.00<--, 2.18<--, 1.66<--, 0.72<--  Mode: AC/ CMV (Assist Control/ Continuous Mandatory Ventilation)  RR (machine): 23  TV (machine): 450  FiO2: 40  PEEP: 5  ITime: 1  MAP: 12  PIP: 30        Assessment   KATINA suspected pre renal azotemia, risk for ischemic ATN; PNA septic shock  Stabilizing     Plan    Continue supportive care IV abx and pressor support  Will follow course.      Grzegorz Gauthier MD
INTERVAL HPI/OVERNIGHT EVENTS:   HPI:  History obtained from chart as pt is lethargic at time of consult.     55 year old man with a past medical history of asthma, DM and HTN. Pt was bought in by EMS with complaints of generalized weakness, SOB and chest congestion for 2 days. Patient also had a cough which was productive of green sputum. In the ED, patient was hypoxic and placed on non rebreather mask, then became agitated, took off the mask and monitor leads then desaturated to 80 %. Patient got ativan. Patient was placed on BIPAP machine. An ABG done showed hypoxia and hypercarbia. BIPAP settings were adjusted with no improvement. After discussion w ED physician, pt will be intubated, go for CT and then admitted to the ICU for further management.     Levo coming down, FIO2 coming down, On CXR R lung up mild infiltrates L side. pleural fistual/air leak significantly decreased. R CT on 20 cm h20 suction     (2021 22:49)           PAST MEDICAL & SURGICAL HISTORY:           ICU Vital Signs Last 24 Hrs  T(C): 36.5 (2021 07:30), Max: 37.4 (2021 19:30)  T(F): 97.7 (2021 07:30), Max: 99.3 (2021 19:30)  HR: 55 (2021 11:27) (49 - 66)  BP: 90/63 (2021 11:00) (77/54 - 137/77)  BP(mean): 73 (2021 11:00) (28 - 98)  ABP: --  ABP(mean): --  RR: 23 (2021 11:00) (6 - 23)  SpO2: 92% (2021 11:27) (92% - 100%)      ABG - ( 2021 06:04 )  pH, Arterial: x     pH, Blood: 7.44  /  pCO2: 50    /  pO2: 126   / HCO3: 34    / Base Excess: 8.2   /  SaO2: 99                  I&O's Detail    2021 07:01  -  2021 07:00  --------------------------------------------------------  IN:    dextrose 5% + sodium chloride 0.9%: 1375 mL    FentaNYL: 553.2 mL    IV PiggyBack: 600 mL    Lactated Ringers Bolus: 2000 mL    Norepinephrine: 240.8 mL    Propofol: 553.2 mL    Vital1.5: 285 mL  Total IN: 5607.2 mL    OUT:    Chest Tube (mL): 20 mL    Indwelling Catheter - Urethral (mL): 655 mL    Nasogastric/Oral tube (mL): 0 mL  Total OUT: 675 mL    Total NET: 4932.2 mL      2021 07:01  -  2021 11:32  --------------------------------------------------------  IN:    dextrose 5% + sodium chloride 0.9%: 375 mL    FentaNYL: 69.1 mL    Norepinephrine: 10.1 mL    Propofol: 40.8 mL    Vital1.5: 90 mL  Total IN: 585 mL    OUT:    Indwelling Catheter - Urethral (mL): 155 mL  Total OUT: 155 mL    Total NET: 430 mL          Mode: AC/ CMV (Assist Control/ Continuous Mandatory Ventilation)  RR (machine): 23  TV (machine): 450  FiO2: 40  PEEP: 5  ITime: 1  MAP: 11  PIP: 27    CAPILLARY BLOOD GLUCOSE      POCT Blood Glucose.: 236 mg/dL (2021 05:32)  POCT Blood Glucose.: 245 mg/dL (2021 23:52)  POCT Blood Glucose.: 147 mg/dL (2021 17:12)    PHYSICAL EXAM:    GENERAL:  intubated.sedated  HEENT No JVD   Lungs CTA B/L  CVS S1 S2 RRR  ABD Nt/Nd  EXT no edema      LABS:                        12.1   12.84 )-----------( 200      ( 2021 04:23 )             37.7      02    139  |  102  |  41<H>  ----------------------------<  317<H>  4.6   |  32<H>  |  2.00<H>    Ca    7.6<L>      2021 04:23  Phos  4.0       Mg     2.2         TPro  5.8<L>  /  Alb  2.5<L>  /  TBili  0.2  /  DBili  x   /  AST  13<L>  /  ALT  29  /  AlkPhos  44      PT/INR - ( 2021 12:26 )   PT: 13.3 sec;   INR: 1.15 ratio         PTT - ( 2021 12:26 )  PTT:29.4 sec  Urinalysis Basic - ( 2021 19:52 )    Color: Yellow / Appearance: Clear / S.020 / pH: x  Gluc: x / Ketone: Negative  / Bili: Negative / Urobili: Negative mg/dL   Blood: x / Protein: 30 mg/dL / Nitrite: Negative   Leuk Esterase: Small / RBC: 0-2 /HPF / WBC 3-5   Sq Epi: x / Non Sq Epi: Moderate / Bacteria: Few      Culture Results:   No growth to date. ( @ 00:28)  Culture Results:   No growth to date. ( @ 00:26)      RADIOLOGY & ADDITIONAL STUDIES:r< from: Xray Chest 1 View-PORTABLE IMMEDIATE (Xray Chest 1 View-PORTABLE IMMEDIATE .) (21 @ 08:25) >  Impression:    Right-sided chest tube. No evidence of pneumothorax.    Airspace opacity within the left mid to lower lung which appears slightly more prominent compared to the prior examination. Unchanged mild airspace opacity at the right lung base with linear atelectasis    < end of copied text >        Assessment and Plan:    CRITICAL CARE TIME SPENT:
[facility] NEPHROLOGY SERVICES, [facility]  NEPHROLOGY AND HYPERTENSION  [address]  [address]  [place] [address]  [phone]    MD PETAR PENA MD ANDREY GONCHARUK, MD MADHU KORRAPATI, MD YELENA ROSENBERG, MD BINNY KOSHY, MD CHRISTOPHER CAPUTO, MD EDWARD BOVER, MD          Patient events noted intubated;     MEDICATIONS  (STANDING):  albuterol/ipratropium for Nebulization 3 milliLiter(s) Nebulizer every 4 hours  azithromycin  IVPB      azithromycin  IVPB 500 milliGRAM(s) IV Intermittent every 24 hours  cefTRIAXone   IVPB 1000 milliGRAM(s) IV Intermittent every 24 hours  chlorhexidine 4% Liquid 1 Application(s) Topical <User Schedule>  dextrose 40% Gel 15 Gram(s) Oral once  dextrose 5% + sodium chloride 0.9%. 1000 milliLiter(s) (125 mL/Hr) IV Continuous <Continuous>  dextrose 5%. 1000 milliLiter(s) (100 mL/Hr) IV Continuous <Continuous>  dextrose 5%. 1000 milliLiter(s) (50 mL/Hr) IV Continuous <Continuous>  dextrose 50% Injectable 25 Gram(s) IV Push once  dextrose 50% Injectable 12.5 Gram(s) IV Push once  dextrose 50% Injectable 25 Gram(s) IV Push once  enoxaparin Injectable 40 milliGRAM(s) SubCutaneous daily  fentaNYL   Infusion. 0.5 MICROgram(s)/kG/Hr (3.86 mL/Hr) IV Continuous <Continuous>  glucagon  Injectable 1 milliGRAM(s) IntraMuscular once  insulin glargine Injectable (LANTUS) 7 Unit(s) SubCutaneous at bedtime  insulin lispro (ADMELOG) corrective regimen sliding scale   SubCutaneous every 6 hours  methylPREDNISolone sodium succinate Injectable 40 milliGRAM(s) IV Push every 8 hours  nicotine -   7 mG/24Hr(s) Patch 1 patch Transdermal daily  norepinephrine Infusion 0.05 MICROgram(s)/kG/Min (7.23 mL/Hr) IV Continuous <Continuous>  pantoprazole  Injectable 40 milliGRAM(s) IV Push daily  polyethylene glycol 3350 17 Gram(s) Oral daily  propofol Infusion 5 MICROgram(s)/kG/Min (2.31 mL/Hr) IV Continuous <Continuous>  senna 2 Tablet(s) Oral at bedtime  sodium chloride 3%  Inhalation 3 milliLiter(s) Inhalation every 4 hours    MEDICATIONS  (PRN):  sodium chloride 0.9% lock flush 10 milliLiter(s) IV Push every 1 hour PRN Pre/post blood products, medications, blood draw, and to maintain line patency      02-26-21 @ 07:01  -  02-27-21 @ 07:00  --------------------------------------------------------  IN: 5607.2 mL / OUT: 675 mL / NET: 4932.2 mL    02-27-21 @ 07:01  -  02-27-21 @ 22:50  --------------------------------------------------------  IN: 2943.4 mL / OUT: 755 mL / NET: 2188.4 mL      PHYSICAL EXAM:      T(C): 37 (02-27-21 @ 19:55), Max: 37.2 (02-27-21 @ 16:52)  HR: 55 (02-27-21 @ 21:00) (49 - 66)  BP: 101/63 (02-27-21 @ 21:00) (73/51 - 137/77)  RR: 23 (02-27-21 @ 21:00) (6 - 23)  SpO2: 92% (02-27-21 @ 21:00) (88% - 100%)  Wt(kg): --  Lungs clear decreased BS Left  Heart S1S2  Abd soft NT ND  Extremities:   tr edema                                    12.1   12.84 )-----------( 200      ( 27 Feb 2021 04:23 )             37.7     02-27    139  |  102  |  41<H>  ----------------------------<  317<H>  4.6   |  32<H>  |  2.00<H>    Ca    7.6<L>      27 Feb 2021 04:23  Phos  4.0     02-27  Mg     2.2     02-27    TPro  5.8<L>  /  Alb  2.5<L>  /  TBili  0.2  /  DBili  x   /  AST  13<L>  /  ALT  29  /  AlkPhos  44  02-27    ABG - ( 26 Feb 2021 06:04 )  pH, Arterial: x     pH, Blood: 7.44  /  pCO2: 50    /  pO2: 126   / HCO3: 34    / Base Excess: 8.2   /  SaO2: 99                LIVER FUNCTIONS - ( 27 Feb 2021 04:23 )  Alb: 2.5 g/dL / Pro: 5.8 gm/dL / ALK PHOS: 44 U/L / ALT: 29 U/L / AST: 13 U/L / GGT: x           Creatinine Trend: 2.00<--, 2.18<--, 1.66<--, 0.72<--  Mode: AC/ CMV (Assist Control/ Continuous Mandatory Ventilation)  RR (machine): 23  TV (machine): 450  FiO2: 40  PEEP: 5  ITime: 1  MAP: 10  PIP: 26        Assessment   KATINA suspected pre renal azotemia, risk for ischemic ATN  Stabilizing     Plan    Continue supportive care   Further recs pending results and course.       Grzegorz Gauthier MD
CHIEF COMPLAINT: Follow up of chronic respiratory failure with COPD  no chest pain  working with PT actively.   no fever       PHYSICAL EXAM:    GENERAL: Moderately built, on NC oxygen   CHEST/LUNG: Clear to ausculation bilaterally, no wheezing, no crackles   HEART: Regular rate and rhythm; No murmurs, rubs  ABDOMEN: Soft, Nontender, Nondistended; Bowel sounds present  EXTREMITIES:  Moving all four extremities spontaneously, No clubbing, cyanosis, or edema  NERVOUS SYSTEM:  Grossly non focal.  Psychiatry: AAO x 3, mood is appropriate       OBJECTIVE DATA:   Vital Signs Last 24 Hrs  T(C): 36.7 (10 Mar 2021 11:30), Max: 36.7 (09 Mar 2021 17:00)  T(F): 98.1 (10 Mar 2021 11:30), Max: 98.1 (09 Mar 2021 17:00)  HR: 92 (10 Mar 2021 11:40) (85 - 104)  BP: 121/70 (10 Mar 2021 11:40) (121/70 - 156/75)  BP(mean): --  RR: 18 (10 Mar 2021 11:40) (17 - 18)  SpO2: 97% (10 Mar 2021 11:40) (93% - 98%)           Daily     Daily     CAPILLARY BLOOD GLUCOSE      POCT Blood Glucose.: 179 mg/dL (10 Mar 2021 10:58)          MEDICATIONS  (STANDING):  albuterol/ipratropium for Nebulization 3 milliLiter(s) Nebulizer every 6 hours  amLODIPine   Tablet 10 milliGRAM(s) Oral daily  dextrose 40% Gel 15 Gram(s) Oral once  dextrose 5%. 1000 milliLiter(s) (50 mL/Hr) IV Continuous <Continuous>  dextrose 5%. 1000 milliLiter(s) (100 mL/Hr) IV Continuous <Continuous>  dextrose 50% Injectable 25 Gram(s) IV Push once  dextrose 50% Injectable 12.5 Gram(s) IV Push once  dextrose 50% Injectable 25 Gram(s) IV Push once  enoxaparin Injectable 40 milliGRAM(s) SubCutaneous daily  glucagon  Injectable 1 milliGRAM(s) IntraMuscular once  insulin lispro (ADMELOG) corrective regimen sliding scale   SubCutaneous three times a day before meals  insulin lispro (ADMELOG) corrective regimen sliding scale   SubCutaneous at bedtime  nicotine - 21 mG/24Hr(s) Patch 1 patch Transdermal daily  polyethylene glycol 3350 17 Gram(s) Oral daily  senna 2 Tablet(s) Oral at bedtime  sodium chloride 3%  Inhalation 3 milliLiter(s) Inhalation every 6 hours    MEDICATIONS  (PRN):  acetaminophen    Suspension .. 650 milliGRAM(s) Oral every 6 hours PRN Temp greater or equal to 38C (100.4F), Mild Pain (1 - 3)  ALBUTerol    0.083% 2.5 milliGRAM(s) Nebulizer every 4 hours PRN Shortness of Breath and/or Wheezing  sodium chloride 0.9% lock flush 10 milliLiter(s) IV Push every 1 hour PRN Pre/post blood products, medications, blood draw, and to maintain line patency      
INTERVAL HPI/OVERNIGHT EVENTS:   HPI:  History obtained from chart as pt is lethargic at time of consult.     55 year old man with a past medical history of asthma, DM and HTN. Pt was bought in by EMS with complaints of generalized weakness, SOB and chest congestion for 2 days. Patient also had a cough which was productive of green sputum. In the ED, patient was hypoxic and placed on non rebreather mask, then became agitated, took off the mask and monitor leads then desaturated to 80 %. Patient got ativan. Patient was placed on BIPAP machine. An ABG done showed hypoxia and hypercarbia. BIPAP settings were adjusted with no improvement. After discussion w ED physician, pt will be intubated, go for CT and then admitted to the ICU for further management.   Pressor off, 50 % Fio2 minimla air leak on [facility]/CT, so I placed on water seal  Cx neg renetta       (2021 22:49)         PAST MEDICAL & SURGICAL HISTORY:             ICU Vital Signs Last 24 Hrs  T(C): 37.1 (2021 07:30), Max: 37.2 (2021 16:52)  T(F): 98.8 (2021 07:30), Max: 99 (2021 16:52)  HR: 68 (2021 11:40) (51 - 98)  BP: 117/70 (2021 10:30) (73/51 - 155/94)  BP(mean): 84 (2021 10:30) (58 - 112)  ABP: --  ABP(mean): --  RR: 23 (2021 10:30) (18 - 23)  SpO2: 93% (2021 11:40) (88% - 95%)      ABG - ( 2021 09:09 )  pH, Arterial: x     pH, Blood: 7.34  /  pCO2: 56    /  pO2: 65    / HCO3: 30    / Base Excess: 3.5   /  SaO2: 90                  I&O's Detail    2021 07:01  -  2021 07:00  --------------------------------------------------------  IN:    dextrose 5% + sodium chloride 0.9%: 3000 mL    FentaNYL: 556.1 mL    Norepinephrine: 61.9 mL    Propofol: 305.1 mL    Vital1.5: 820 mL  Total IN: 4743.1 mL    OUT:    Chest Tube (mL): 130 mL    Indwelling Catheter - Urethral (mL): 1225 mL    Voided (mL): 600 mL  Total OUT: 1955 mL    Total NET: 2788.1 mL      2021 07:01  -  2021 11:45  --------------------------------------------------------  IN:    dextrose 5% + sodium chloride 0.9%: 500 mL    FentaNYL: 100 mL    Propofol: 61.2 mL  Total IN: 661.2 mL    OUT:    Indwelling Catheter - Urethral (mL): 100 mL  Total OUT: 100 mL    Total NET: 561.2 mL          Mode: AC/ CMV (Assist Control/ Continuous Mandatory Ventilation)  RR (machine): 23  TV (machine): 450  FiO2: 50  PEEP: 5  ITime: 0.7  MAP: 10  PIP: 23    CAPILLARY BLOOD GLUCOSE      POCT Blood Glucose.: 275 mg/dL (2021 05:19)  POCT Blood Glucose.: 257 mg/dL (2021 00:09)  POCT Blood Glucose.: 231 mg/dL (2021 17:09)  POCT Blood Glucose.: 260 mg/dL (2021 11:46)    PHYSICAL EXAM:    GENERAL:  intuibated/sedated  HEENt No JVD  Lungs CTA B/L  CVS S1 S2  RRR  ABD NT/ND  ext no edema    LABS:                        11.3   13.78 )-----------( 179      ( 2021 04:18 )             34.9          144  |  111<H>  |  39<H>  ----------------------------<  293<H>  4.3   |  29  |  1.23    Ca    6.9<L>      2021 04:18  Phos  2.6       Mg     2.5         TPro  5.0<L>  /  Alb  2.0<L>  /  TBili  0.1<L>  /  DBili  x   /  AST  13<L>  /  ALT  21  /  AlkPhos  39<L>        Urinalysis Basic - ( 2021 19:52 )    Color: Yellow / Appearance: Clear / S.020 / pH: x  Gluc: x / Ketone: Negative  / Bili: Negative / Urobili: Negative mg/dL   Blood: x / Protein: 30 mg/dL / Nitrite: Negative   Leuk Esterase: Small / RBC: 0-2 /HPF / WBC 3-5   Sq Epi: x / Non Sq Epi: Moderate / Bacteria: Few      Culture Results:   Normal Respiratory Love present ( @ 16:35)  Culture Results:   No growth to date. ( @ 00:28)  Culture Results:   No growth to date. ( @ 00:26)      RADIOLOGY & ADDITIONAL STUDIES:      Assessment and Plan:    CRITICAL CARE TIME SPENT:
INTERVAL HPI/OVERNIGHT EVENTS:   HPI:  History obtained from chart as pt is lethargic at time of consult.     55 year old man with a past medical history of asthma, DM and HTN. Pt was bought in by EMS with complaints of generalized weakness, SOB and chest congestion for 2 days. Patient also had a cough which was productive of green sputum. In the ED, patient was hypoxic and placed on non rebreather mask, then became agitated, took off the mask and monitor leads then desaturated to 80 %. Patient got ativan. Patient was placed on BIPAP machine. An ABG done showed hypoxia and hypercarbia. BIPAP settings were adjusted with no improvement. After discussion w ED physician, pt will be intubated, go for CT and then admitted to the ICU for further management.     O/N events noted  R lung now up on CXR R CT to 20 cm h20 suction intermittent air leak. Satting well i came down to 40 % FIO2 satting 96     (25 Feb 2021 22:49)                 ICU Vital Signs Last 24 Hrs  T(C): 38.5 (26 Feb 2021 04:50), Max: 38.5 (26 Feb 2021 04:50)  T(F): 101.3 (26 Feb 2021 04:50), Max: 101.3 (26 Feb 2021 04:50)  HR: 66 (26 Feb 2021 09:17) (66 - 126)  BP: 107/75 (26 Feb 2021 08:00) (68/47 - 200/100)  BP(mean): 86 (26 Feb 2021 08:00) (61 - 123)  ABP: --  ABP(mean): --  RR: 23 (26 Feb 2021 08:00) (11 - 51)  SpO2: 96% (26 Feb 2021 09:17) (62% - 100%)      ABG - ( 26 Feb 2021 06:04 )  pH, Arterial: x     pH, Blood: 7.44  /  pCO2: 50    /  pO2: 126   / HCO3: 34    / Base Excess: 8.2   /  SaO2: 99                  I&O's Detail    25 Feb 2021 07:01  -  26 Feb 2021 07:00  --------------------------------------------------------  IN:    FentaNYL: 115.5 mL    IV PiggyBack: 600 mL    Norepinephrine: 255.9 mL    Propofol: 127.2 mL  Total IN: 1098.6 mL    OUT:    Chest Tube (mL): 8 mL    Indwelling Catheter - Urethral (mL): 145 mL    Nasogastric/Oral tube (mL): 0 mL  Total OUT: 153 mL    Total NET: 945.6 mL          Mode: AC/ CMV (Assist Control/ Continuous Mandatory Ventilation)  RR (machine): 23  TV (machine): 450  FiO2: 40  PEEP: 5  ITime: 0.7  MAP: 11  PIP: 29    CAPILLARY BLOOD GLUCOSE      POCT Blood Glucose.: 203 mg/dL (26 Feb 2021 05:29)  POCT Blood Glucose.: 252 mg/dL (25 Feb 2021 23:32)    PHYSICAL EXAM:    GENERAL: intuated sedated  HEENT no JVD   Lungs no significant wheezing  CVs S1 s2 RR  ABD Nt/ND  ext no edema      LABS:                        13.1   9.28  )-----------( 238      ( 26 Feb 2021 03:17 )             43.9      02-26    143  |  100  |  28<H>  ----------------------------<  178<H>  5.1   |  37<H>  |  1.66<H>    Ca    8.2<L>      26 Feb 2021 03:17  Phos  5.4     02-26  Mg     1.9     02-26    TPro  6.4  /  Alb  2.9<L>  /  TBili  0.4  /  DBili  x   /  AST  52<H>  /  ALT  42  /  AlkPhos  51  02-26    PT/INR - ( 25 Feb 2021 12:26 )   PT: 13.3 sec;   INR: 1.15 ratio         PTT - ( 25 Feb 2021 12:26 )  PTT:29.4 sec        RADIOLOGY & ADDITIONAL STUDIES: CXr R lung up, R pigtail in place. L infiltrates      Assessment and Plan:    CRITICAL CARE TIME SPENT:
HPI:  History obtained from chart as pt is lethargic at time of consult.     55 year old man with a past medical history of asthma, DM and HTN. Pt was bought in by EMS with complaints of generalized weakness, SOB and chest congestion for 2 days. Patient also had a cough which was productive of green sputum. In the ED, patient was hypoxic and placed on non rebreather mask, then became agitated, took off the mask and monitor leads then desaturated to 80 %. Patient got ativan. Patient was placed on BIPAP machine. An ABG done showed hypoxia and hypercarbia. BIPAP settings were adjusted with no improvement. After discussion w ED physician, pt will be intubated, go for CT and then admitted to the ICU for further management.      (25 Feb 2021 22:49)  Patient is a 55y old  Male who presents with a chief complaint of Hypoxic and hypercarbic respiratory failure (06 Mar 2021 14:41)      INTERVAL HPI/OVERNIGHT EVENTS: no acute events comfortable     MEDICATIONS  (STANDING):  albuterol/ipratropium for Nebulization 3 milliLiter(s) Nebulizer every 6 hours  amLODIPine   Tablet 10 milliGRAM(s) Oral daily  chlorhexidine 4% Liquid 1 Application(s) Topical <User Schedule>  dextrose 40% Gel 15 Gram(s) Oral once  dextrose 5%. 1000 milliLiter(s) (100 mL/Hr) IV Continuous <Continuous>  dextrose 5%. 1000 milliLiter(s) (50 mL/Hr) IV Continuous <Continuous>  dextrose 50% Injectable 25 Gram(s) IV Push once  dextrose 50% Injectable 12.5 Gram(s) IV Push once  dextrose 50% Injectable 25 Gram(s) IV Push once  enoxaparin Injectable 40 milliGRAM(s) SubCutaneous daily  glucagon  Injectable 1 milliGRAM(s) IntraMuscular once  insulin lispro (ADMELOG) corrective regimen sliding scale   SubCutaneous three times a day before meals  insulin lispro (ADMELOG) corrective regimen sliding scale   SubCutaneous at bedtime  nicotine - 21 mG/24Hr(s) Patch 1 patch Transdermal daily  polyethylene glycol 3350 17 Gram(s) Oral daily  senna 2 Tablet(s) Oral at bedtime  sodium chloride 3%  Inhalation 3 milliLiter(s) Inhalation every 6 hours    MEDICATIONS  (PRN):  acetaminophen    Suspension .. 650 milliGRAM(s) Oral every 6 hours PRN Temp greater or equal to 38C (100.4F), Mild Pain (1 - 3)  ALBUTerol    0.083% 2.5 milliGRAM(s) Nebulizer every 4 hours PRN Shortness of Breath and/or Wheezing  sodium chloride 0.9% lock flush 10 milliLiter(s) IV Push every 1 hour PRN Pre/post blood products, medications, blood draw, and to maintain line patency      Allergies    No Known Allergies    Intolerances        REVIEW OF SYSTEMS:  CONSTITUTIONAL: No fever, weight loss, or fatigue  EYES: No eye pain, visual disturbances, or discharge  ENMT:  No difficulty hearing, tinnitus, vertigo; No sinus or throat pain  NECK: No pain or stiffness  BREASTS: No pain, masses, or nipple discharge  CARDIOVASCULAR: No chest pain, palpitations, dizziness, or leg swelling  GASTROINTESTINAL: No abdominal or epigastric pain. No nausea, vomiting, or hematemesis; No diarrhea or constipation. No melena or hematochezia.  GENITOURINARY: No dysuria, frequency, hematuria, or incontinence  NEUROLOGICAL: No headaches, memory loss, loss of strength, numbness, or tremors  SKIN: No itching, burning, rashes, or lesions   LYMPH NODES: No enlarged glands  ENDOCRINE: No heat or cold intolerance; No hair loss  MUSCULOSKELETAL: No joint pain or swelling; No muscle, back, or extremity pain  PSYCHIATRIC: No depression, anxiety, mood swings, or difficulty sleeping  HEME/LYMPH: No easy bruising, or bleeding gums  ALLERGY AND IMMUNOLOGIC: No hives or eczema    Vital Signs Last 24 Hrs  T(C): 36.9 (07 Mar 2021 17:00), Max: 37.2 (07 Mar 2021 12:04)  T(F): 98.5 (07 Mar 2021 17:00), Max: 99 (07 Mar 2021 12:04)  HR: 95 (07 Mar 2021 17:37) (92 - 102)  BP: 127/80 (07 Mar 2021 17:00) (127/80 - 168/78)  BP(mean): --  RR: 17 (07 Mar 2021 17:00) (16 - 19)  SpO2: 97% (07 Mar 2021 17:37) (94% - 97%)    PHYSICAL EXAM:  GENERAL: NAD, well-groomed, well-developed  HEAD:  Atraumatic, Normocephalic  EYES: EOMI, PERRLA, conjunctiva and sclera clear  ENMT: No tonsillar erythema, exudates, or enlargement; Moist mucous membranes, poor  dentition, No lesions  NECK: Supple, No JVD, Normal thyroid  NERVOUS SYSTEM:  Alert & Oriented X3, Good concentration; Motor Strength 5/5 B/L upper and lower extremities; DTRs 2+ intact and symmetric  CHEST/LUNG: Clear to percussion bilaterally; No rales, rhonchi, wheezing, or rubs  HEART: Regular rate and rhythm; No murmurs, rubs, or gallops  ABDOMEN: Soft, Nontender, Nondistended; Bowel sounds present  EXTREMITIES:  2+ Peripheral Pulses, No clubbing, cyanosis, or edema  LYMPH: No lymphadenopathy noted  SKIN: No rashes or lesions    LABS:                        13.0   8.16  )-----------( 209      ( 07 Mar 2021 07:14 )             42.1     03-07    141  |  105  |  7   ----------------------------<  128<H>  3.9   |  34<H>  |  0.70    Ca    8.3<L>      07 Mar 2021 07:14  Phos  2.9     03-07  Mg     1.7     03-07    TPro  6.8  /  Alb  2.8<L>  /  TBili  0.4  /  DBili  x   /  AST  35  /  ALT  56  /  AlkPhos  55  03-07        CAPILLARY BLOOD GLUCOSE      POCT Blood Glucose.: 95 mg/dL (07 Mar 2021 16:35)  POCT Blood Glucose.: 158 mg/dL (07 Mar 2021 11:28)  POCT Blood Glucose.: 131 mg/dL (07 Mar 2021 07:46)  POCT Blood Glucose.: 109 mg/dL (06 Mar 2021 21:53)      RADIOLOGY & ADDITIONAL TESTS:    Imaging Personally Reviewed:  [ ] YES  [ ] NO    Consultant(s) Notes Reviewed:  [ ] YES  [ ] NO    Care Discussed with Consultants/Other Providers [ ] YES  [ ] NO
HPI:  History obtained from chart as pt is lethargic at time of consult.     55 year old man with a past medical history of asthma, DM and HTN. Pt was bought in by EMS with complaints of generalized weakness, SOB and chest congestion for 2 days. Patient also had a cough which was productive of green sputum. In the ED, patient was hypoxic and placed on non rebreather mask, then became agitated, took off the mask and monitor leads then desaturated to 80 %. Patient got ativan. Patient was placed on BIPAP machine. An ABG done showed hypoxia and hypercarbia. BIPAP settings were adjusted with no improvement. After discussion w ED physician, pt will be intubated, go for CT and then admitted to the ICU for further management.      (25 Feb 2021 22:49)  Patient is a 55y old  Male who presents with a chief complaint of Hypoxic and hypercarbic respiratory failure (06 Mar 2021 14:41)      INTERVAL HPI/OVERNIGHT EVENTS: no acute events comfortable     MEDICATIONS  (STANDING):  albuterol/ipratropium for Nebulization 3 milliLiter(s) Nebulizer every 6 hours  amLODIPine   Tablet 10 milliGRAM(s) Oral daily  chlorhexidine 4% Liquid 1 Application(s) Topical <User Schedule>  dextrose 40% Gel 15 Gram(s) Oral once  dextrose 5%. 1000 milliLiter(s) (100 mL/Hr) IV Continuous <Continuous>  dextrose 5%. 1000 milliLiter(s) (50 mL/Hr) IV Continuous <Continuous>  dextrose 50% Injectable 25 Gram(s) IV Push once  dextrose 50% Injectable 12.5 Gram(s) IV Push once  dextrose 50% Injectable 25 Gram(s) IV Push once  enoxaparin Injectable 40 milliGRAM(s) SubCutaneous daily  glucagon  Injectable 1 milliGRAM(s) IntraMuscular once  insulin lispro (ADMELOG) corrective regimen sliding scale   SubCutaneous three times a day before meals  insulin lispro (ADMELOG) corrective regimen sliding scale   SubCutaneous at bedtime  nicotine - 21 mG/24Hr(s) Patch 1 patch Transdermal daily  polyethylene glycol 3350 17 Gram(s) Oral daily  senna 2 Tablet(s) Oral at bedtime  sodium chloride 3%  Inhalation 3 milliLiter(s) Inhalation every 6 hours    MEDICATIONS  (PRN):  acetaminophen    Suspension .. 650 milliGRAM(s) Oral every 6 hours PRN Temp greater or equal to 38C (100.4F), Mild Pain (1 - 3)  ALBUTerol    0.083% 2.5 milliGRAM(s) Nebulizer every 4 hours PRN Shortness of Breath and/or Wheezing  sodium chloride 0.9% lock flush 10 milliLiter(s) IV Push every 1 hour PRN Pre/post blood products, medications, blood draw, and to maintain line patency      Allergies    No Known Allergies    Intolerances        REVIEW OF SYSTEMS:  CONSTITUTIONAL: No fever, weight loss, or fatigue  EYES: No eye pain, visual disturbances, or discharge  ENMT:  No difficulty hearing, tinnitus, vertigo; No sinus or throat pain  NECK: No pain or stiffness  BREASTS: No pain, masses, or nipple discharge  CARDIOVASCULAR: No chest pain, palpitations, dizziness, or leg swelling  GASTROINTESTINAL: No abdominal or epigastric pain. No nausea, vomiting, or hematemesis; No diarrhea or constipation. No melena or hematochezia.  GENITOURINARY: No dysuria, frequency, hematuria, or incontinence  NEUROLOGICAL: No headaches, memory loss, loss of strength, numbness, or tremors  SKIN: No itching, burning, rashes, or lesions   LYMPH NODES: No enlarged glands  ENDOCRINE: No heat or cold intolerance; No hair loss  MUSCULOSKELETAL: No joint pain or swelling; No muscle, back, or extremity pain  PSYCHIATRIC: No depression, anxiety, mood swings, or difficulty sleeping  HEME/LYMPH: No easy bruising, or bleeding gums  ALLERGY AND IMMUNOLOGIC: No hives or eczema    ICU Vital Signs Last 24 Hrs  T(C): 36.8 (08 Mar 2021 17:00), Max: 37.4 (07 Mar 2021 23:43)  T(F): 98.3 (08 Mar 2021 17:00), Max: 99.3 (07 Mar 2021 23:43)  HR: 89 (08 Mar 2021 17:38) (81 - 103)  BP: 134/79 (08 Mar 2021 17:00) (134/79 - 152/85)  RR: 16 (08 Mar 2021 17:00) (16 - 18)  SpO2: 97% (08 Mar 2021 17:38) (90% - 97%)      PHYSICAL EXAM:  GENERAL: NAD, well-groomed, well-developed  HEAD:  Atraumatic, Normocephalic  EYES: EOMI, PERRLA, conjunctiva and sclera clear  ENMT: No tonsillar erythema, exudates, or enlargement; Moist mucous membranes, poor  dentition, No lesions  NECK: Supple, No JVD, Normal thyroid  NERVOUS SYSTEM:  Alert & Oriented X3, Good concentration; Motor Strength 5/5 B/L upper and lower extremities; DTRs 2+ intact and symmetric  CHEST/LUNG: Clear to percussion bilaterally; No rales, rhonchi, wheezing, or rubs  HEART: Regular rate and rhythm; No murmurs, rubs, or gallops  ABDOMEN: Soft, Nontender, Nondistended; Bowel sounds present  EXTREMITIES:  2+ Peripheral Pulses, No clubbing, cyanosis, or edema  LYMPH: No lymphadenopathy noted  SKIN: No rashes or lesions    LABS:                        13.0   8.16  )-----------( 209      ( 07 Mar 2021 07:14 )             42.1     03-07    141  |  105  |  7   ----------------------------<  128<H>  3.9   |  34<H>  |  0.70    Ca    8.3<L>      07 Mar 2021 07:14  Phos  2.9     03-07  Mg     1.7     03-07    TPro  6.8  /  Alb  2.8<L>  /  TBili  0.4  /  DBili  x   /  AST  35  /  ALT  56  /  AlkPhos  55  03-07        CAPILLARY BLOOD GLUCOSE      POCT Blood Glucose.: 95 mg/dL (07 Mar 2021 16:35)  POCT Blood Glucose.: 158 mg/dL (07 Mar 2021 11:28)  POCT Blood Glucose.: 131 mg/dL (07 Mar 2021 07:46)  POCT Blood Glucose.: 109 mg/dL (06 Mar 2021 21:53)      RADIOLOGY & ADDITIONAL TESTS:    Imaging Personally Reviewed:  [ ] YES  [ ] NO    Consultant(s) Notes Reviewed:  [ ] YES  [ ] NO    Care Discussed with Consultants/Other Providers [ ] YES  [ ] NO
HPI:  History obtained from chart as pt is lethargic at time of consult.     55 year old man with a past medical history of asthma, DM and HTN. Pt was bought in by EMS with complaints of generalized weakness, SOB and chest congestion for 2 days. Patient also had a cough which was productive of green sputum. In the ED, patient was hypoxic and placed on non rebreather mask, then became agitated, took off the mask and monitor leads then desaturated to 80 %. Patient got ativan. Patient was placed on BIPAP machine. An ABG done showed hypoxia and hypercarbia. BIPAP settings were adjusted with no improvement. After discussion w ED physician, pt will be intubated, go for CT and then admitted to the ICU for further management.      (25 Feb 2021 22:49)    Patient is a 55y old  Male who presents with a chief complaint of Hypoxic and hypercarbic respiratory failure (09 Mar 2021 08:50)      INTERVAL HPI/OVERNIGHT EVENTS: no acute events awaiting discahrge home with home o2     MEDICATIONS  (STANDING):  albuterol/ipratropium for Nebulization 3 milliLiter(s) Nebulizer every 6 hours  amLODIPine   Tablet 10 milliGRAM(s) Oral daily  chlorhexidine 4% Liquid 1 Application(s) Topical <User Schedule>  dextrose 40% Gel 15 Gram(s) Oral once  dextrose 5%. 1000 milliLiter(s) (100 mL/Hr) IV Continuous <Continuous>  dextrose 5%. 1000 milliLiter(s) (50 mL/Hr) IV Continuous <Continuous>  dextrose 50% Injectable 25 Gram(s) IV Push once  dextrose 50% Injectable 12.5 Gram(s) IV Push once  dextrose 50% Injectable 25 Gram(s) IV Push once  enoxaparin Injectable 40 milliGRAM(s) SubCutaneous daily  glucagon  Injectable 1 milliGRAM(s) IntraMuscular once  insulin lispro (ADMELOG) corrective regimen sliding scale   SubCutaneous three times a day before meals  insulin lispro (ADMELOG) corrective regimen sliding scale   SubCutaneous at bedtime  LORazepam   Injectable 1 milliGRAM(s) IV Push once  nicotine - 21 mG/24Hr(s) Patch 1 patch Transdermal daily  polyethylene glycol 3350 17 Gram(s) Oral daily  senna 2 Tablet(s) Oral at bedtime  sodium chloride 3%  Inhalation 3 milliLiter(s) Inhalation every 6 hours    MEDICATIONS  (PRN):  acetaminophen    Suspension .. 650 milliGRAM(s) Oral every 6 hours PRN Temp greater or equal to 38C (100.4F), Mild Pain (1 - 3)  ALBUTerol    0.083% 2.5 milliGRAM(s) Nebulizer every 4 hours PRN Shortness of Breath and/or Wheezing  sodium chloride 0.9% lock flush 10 milliLiter(s) IV Push every 1 hour PRN Pre/post blood products, medications, blood draw, and to maintain line patency      Allergies    No Known Allergies    Intolerances        REVIEW OF SYSTEMS:  CONSTITUTIONAL: No fever, weight loss, or fatigue  EYES: No eye pain, visual disturbances, or discharge  ENMT:  No difficulty hearing, tinnitus, vertigo; No sinus or throat pain  NECK: No pain or stiffness  BREASTS: No pain, masses, or nipple discharge  RESPIRATORY: No cough, wheezing, chills or hemoptysis; No shortness of breath  CARDIOVASCULAR: No chest pain, palpitations, dizziness, or leg swelling  GASTROINTESTINAL: No abdominal or epigastric pain. No nausea, vomiting, or hematemesis; No diarrhea or constipation. No melena or hematochezia.  GENITOURINARY: No dysuria, frequency, hematuria, or incontinence  NEUROLOGICAL: No headaches, memory loss, loss of strength, numbness, or tremors  SKIN: No itching, burning, rashes, or lesions   LYMPH NODES: No enlarged glands  ENDOCRINE: No heat or cold intolerance; No hair loss  MUSCULOSKELETAL: No joint pain or swelling; No muscle, back, or extremity pain  PSYCHIATRIC: anxiety  HEME/LYMPH: No easy bruising, or bleeding gums  ALLERGY AND IMMUNOLOGIC: No hives or eczema    Vital Signs Last 24 Hrs  T(C): 36.7 (09 Mar 2021 17:00), Max: 36.9 (09 Mar 2021 05:14)  T(F): 98.1 (09 Mar 2021 17:00), Max: 98.5 (09 Mar 2021 05:14)  HR: 94 (09 Mar 2021 17:25) (86 - 101)  BP: 125/84 (09 Mar 2021 17:00) (125/84 - 147/83)  RR: 17 (09 Mar 2021 17:00) (17 - 18)  SpO2: 93% (09 Mar 2021 17:25) (90% - 98%)    PHYSICAL EXAM:  GENERAL: NAD, well-groomed, well-developed  HEAD:  Atraumatic, Normocephalic  EYES: EOMI, PERRLA, conjunctiva and sclera clear  ENMT: No tonsillar erythema, exudates, or enlargement; Moist mucous membranes, Good dentition, No lesions  NECK: Supple, No JVD, Normal thyroid  NERVOUS SYSTEM:  Alert & Oriented X3, Good concentration; Motor Strength 5/5 B/L upper and lower extremities; DTRs 2+ intact and symmetric  CHEST/LUNG: Clear to percussion bilaterally; No rales, rhonchi, wheezing, or rubs  HEART: Regular rate and rhythm; No murmurs, rubs, or gallops  ABDOMEN: Soft, Nontender, Nondistended; Bowel sounds present  EXTREMITIES:  2+ Peripheral Pulses, No clubbing, cyanosis, or edema  LYMPH: No lymphadenopathy noted  SKIN: No rashes or lesions    LABS:              CAPILLARY BLOOD GLUCOSE      POCT Blood Glucose.: 124 mg/dL (09 Mar 2021 16:19)  POCT Blood Glucose.: 165 mg/dL (09 Mar 2021 12:10)  POCT Blood Glucose.: 205 mg/dL (09 Mar 2021 08:10)  POCT Blood Glucose.: 169 mg/dL (08 Mar 2021 21:17)      RADIOLOGY & ADDITIONAL TESTS:    Imaging Personally Reviewed:  [ ] YES  [ ] NO    Consultant(s) Notes Reviewed:  [ ] YES  [ ] NO    Care Discussed with Consultants/Other Providers [ ] YES  [ ] NO
HPI:  History obtained from chart as pt is lethargic at time of consult.     55 year old man with a past medical history of asthma, DM and HTN. Pt was bought in by EMS with complaints of generalized weakness, SOB and chest congestion for 2 days. Patient also had a cough which was productive of green sputum. In the ED, patient was hypoxic and placed on non rebreather mask, then became agitated, took off the mask and monitor leads then desaturated to 80 %. Patient got ativan. Patient was placed on BIPAP machine. An ABG done showed hypoxia and hypercarbia. BIPAP settings were adjusted with no improvement. After discussion w ED physician, pt will be intubated, go for CT and then admitted to the ICU for further management.     Patient is a 55y old  Male who presents with a chief complaint of Hypoxic and hypercarbic respiratory failure (05 Mar 2021 19:27)      INTERVAL HPI/OVERNIGHT EVENTS: no acute events overnight     MEDICATIONS  (STANDING):  albuterol/ipratropium for Nebulization 3 milliLiter(s) Nebulizer every 6 hours  amLODIPine   Tablet 10 milliGRAM(s) Oral daily  chlorhexidine 4% Liquid 1 Application(s) Topical <User Schedule>  dextrose 40% Gel 15 Gram(s) Oral once  dextrose 5%. 1000 milliLiter(s) (50 mL/Hr) IV Continuous <Continuous>  dextrose 5%. 1000 milliLiter(s) (100 mL/Hr) IV Continuous <Continuous>  dextrose 50% Injectable 25 Gram(s) IV Push once  dextrose 50% Injectable 12.5 Gram(s) IV Push once  dextrose 50% Injectable 25 Gram(s) IV Push once  enoxaparin Injectable 40 milliGRAM(s) SubCutaneous daily  glucagon  Injectable 1 milliGRAM(s) IntraMuscular once  insulin lispro (ADMELOG) corrective regimen sliding scale   SubCutaneous three times a day before meals  insulin lispro (ADMELOG) corrective regimen sliding scale   SubCutaneous at bedtime  nicotine - 21 mG/24Hr(s) Patch 1 patch Transdermal daily  polyethylene glycol 3350 17 Gram(s) Oral daily  senna 2 Tablet(s) Oral at bedtime  sodium chloride 3%  Inhalation 3 milliLiter(s) Inhalation every 6 hours    MEDICATIONS  (PRN):  acetaminophen    Suspension .. 650 milliGRAM(s) Oral every 6 hours PRN Temp greater or equal to 38C (100.4F), Mild Pain (1 - 3)  ALBUTerol    0.083% 2.5 milliGRAM(s) Nebulizer every 4 hours PRN Shortness of Breath and/or Wheezing  sodium chloride 0.9% lock flush 10 milliLiter(s) IV Push every 1 hour PRN Pre/post blood products, medications, blood draw, and to maintain line patency      Allergies    No Known Allergies    Intolerances        REVIEW OF SYSTEMS:  CONSTITUTIONAL: No fever, weight loss, or fatigue  EYES: No eye pain, visual disturbances, or discharge  ENMT:  No difficulty hearing, tinnitus, vertigo; No sinus or throat pain  NECK: No pain or stiffness  BREASTS: No pain, masses, or nipple discharge  RESPIRATORY: No cough, wheezing, chills or hemoptysis; No shortness of breath  CARDIOVASCULAR: No chest pain, palpitations, dizziness, or leg swelling  GASTROINTESTINAL: No abdominal or epigastric pain. No nausea, vomiting, or hematemesis; No diarrhea or constipation. No melena or hematochezia.  GENITOURINARY: No dysuria, frequency, hematuria, or incontinence  NEUROLOGICAL: No headaches, memory loss, loss of strength, numbness, or tremors  SKIN: No itching, burning, rashes, or lesions   LYMPH NODES: No enlarged glands  ENDOCRINE: No heat or cold intolerance; No hair loss  MUSCULOSKELETAL: No joint pain or swelling; No muscle, back, or extremity pain  PSYCHIATRIC: No depression, anxiety, mood swings, or difficulty sleeping  HEME/LYMPH: No easy bruising, or bleeding gums  ALLERGY AND IMMUNOLOGIC: No hives or eczema    Vital Signs Last 24 Hrs  T(C): 36.9 (06 Mar 2021 06:00), Max: 37.3 (05 Mar 2021 17:05)  T(F): 98.4 (06 Mar 2021 06:00), Max: 99.2 (06 Mar 2021 00:30)  HR: 89 (06 Mar 2021 11:44) (69 - 92)  BP: 160/94 (06 Mar 2021 06:00) (158/84 - 170/94)  BP(mean): --  RR: 18 (06 Mar 2021 06:00) (16 - 18)  SpO2: 92% (06 Mar 2021 11:44) (90% - 98%)    PHYSICAL EXAM:  GENERAL: NAD, well-groomed, well-developed  HEAD:  Atraumatic, Normocephalic  EYES: EOMI, PERRLA, conjunctiva and sclera clear  ENMT: No tonsillar erythema, exudates, or enlargement; Moist mucous membranes, poor  dentition, No lesions  NECK: Supple, No JVD, Normal thyroid  NERVOUS SYSTEM:  Alert & Oriented X3, poor concentration; Motor Strength 5/5 B/L upper and lower extremities; DTRs 2+ intact and symmetric  CHEST/LUNG: Clear to percussion bilaterally; No rales, rhonchi, wheezing, or rubs  HEART: Regular rate and rhythm; No murmurs, rubs, or gallops  ABDOMEN: Soft, Nontender, Nondistended; Bowel sounds present  EXTREMITIES:  2+ Peripheral Pulses, No clubbing, cyanosis, or edema  LYMPH: No lymphadenopathy noted  SKIN: No rashes or lesions    LABS:                        11.3   10.05 )-----------( 314      ( 05 Mar 2021 06:16 )             36.0     03-06    141  |  100  |  9   ----------------------------<  143<H>  3.8   |  35<H>  |  0.71    Ca    8.7      06 Mar 2021 07:37  Phos  2.5     03-05  Mg     2.0     03-05        Urinalysis Basic - ( 04 Mar 2021 22:26 )    Color: Yellow / Appearance: Clear / S.010 / pH: x  Gluc: x / Ketone: Negative  / Bili: Negative / Urobili: Negative mg/dL   Blood: x / Protein: 15 mg/dL / Nitrite: Negative   Leuk Esterase: Negative / RBC: 0-2 /HPF / WBC 0-2   Sq Epi: x / Non Sq Epi: Occasional / Bacteria: Negative      CAPILLARY BLOOD GLUCOSE      POCT Blood Glucose.: 123 mg/dL (06 Mar 2021 11:39)  POCT Blood Glucose.: 140 mg/dL (06 Mar 2021 07:54)  POCT Blood Glucose.: 137 mg/dL (05 Mar 2021 20:45)  POCT Blood Glucose.: 102 mg/dL (05 Mar 2021 16:41)      RADIOLOGY & ADDITIONAL TESTS:    Imaging Personally Reviewed:  [ X] YES  [ ] NO    Consultant(s) Notes Reviewed:  [ X] YES  [ ] NO    Care Discussed with Consultants/Other Providers [ X] YES  [ ] NO  
HPI:  History obtained from chart as pt is lethargic at time of consult.     55 year old man with a past medical history of asthma, DM and HTN. Pt was bought in by EMS with complaints of generalized weakness, SOB and chest congestion for 2 days. Patient also had a cough which was productive of green sputum. In the ED, patient was hypoxic and placed on non rebreather mask, then became agitated, took off the mask and monitor leads then desaturated to 80 %. Patient got ativan. Patient was placed on BIPAP machine. An ABG done showed hypoxia and hypercarbia. BIPAP settings were adjusted with no improvement. After discussion w ED physician, pt will be intubated, go for CT and then admitted to the ICU for further management.   Patient is a 55y old  Male who presents with a chief complaint of Hypoxic and hypercarbic respiratory failure (03 Mar 2021 22:04)      INTERVAL HPI/OVERNIGHT EVENTS: transfer out of ICU     MEDICATIONS  (STANDING):  albuterol/ipratropium for Nebulization 3 milliLiter(s) Nebulizer every 6 hours  chlorhexidine 4% Liquid 1 Application(s) Topical <User Schedule>  dextrose 40% Gel 15 Gram(s) Oral once  dextrose 5%. 1000 milliLiter(s) (50 mL/Hr) IV Continuous <Continuous>  dextrose 5%. 1000 milliLiter(s) (100 mL/Hr) IV Continuous <Continuous>  dextrose 50% Injectable 25 Gram(s) IV Push once  dextrose 50% Injectable 12.5 Gram(s) IV Push once  dextrose 50% Injectable 25 Gram(s) IV Push once  enoxaparin Injectable 40 milliGRAM(s) SubCutaneous daily  glucagon  Injectable 1 milliGRAM(s) IntraMuscular once  insulin lispro (ADMELOG) corrective regimen sliding scale   SubCutaneous three times a day before meals  insulin lispro (ADMELOG) corrective regimen sliding scale   SubCutaneous at bedtime  nicotine - 21 mG/24Hr(s) Patch 1 patch Transdermal daily  polyethylene glycol 3350 17 Gram(s) Oral daily  senna 2 Tablet(s) Oral at bedtime  sodium chloride 3%  Inhalation 3 milliLiter(s) Inhalation every 6 hours    MEDICATIONS  (PRN):  acetaminophen    Suspension .. 650 milliGRAM(s) Oral every 6 hours PRN Temp greater or equal to 38C (100.4F), Mild Pain (1 - 3)  ALBUTerol    0.083% 2.5 milliGRAM(s) Nebulizer every 4 hours PRN Shortness of Breath and/or Wheezing  sodium chloride 0.9% lock flush 10 milliLiter(s) IV Push every 1 hour PRN Pre/post blood products, medications, blood draw, and to maintain line patency      Allergies    No Known Allergies    Intolerances        REVIEW OF SYSTEMS:  CONSTITUTIONAL: No fever, weight loss, or fatigue  EYES: No eye pain, visual disturbances, or discharge  ENMT:  No difficulty hearing, tinnitus, vertigo; No sinus or throat pain  NECK: No pain or stiffness  BREASTS: No pain, masses, or nipple discharge  RESPIRATORY: No cough, wheezing, chills or hemoptysis; No shortness of breath  CARDIOVASCULAR: No chest pain, palpitations, dizziness, or leg swelling  GASTROINTESTINAL: No abdominal or epigastric pain. No nausea, vomiting, or hematemesis; No diarrhea or constipation. No melena or hematochezia.  GENITOURINARY: No dysuria, frequency, hematuria, or incontinence  NEUROLOGICAL: No headaches, memory loss, loss of strength, numbness, or tremors  SKIN: No itching, burning, rashes, or lesions   LYMPH NODES: No enlarged glands  ENDOCRINE: No heat or cold intolerance; No hair loss  MUSCULOSKELETAL: No joint pain or swelling; No muscle, back, or extremity pain  PSYCHIATRIC: No depression, anxiety, mood swings, or difficulty sleeping  HEME/LYMPH: No easy bruising, or bleeding gums  ALLERGY AND IMMUNOLOGIC: No hives or eczema    Vital Signs Last 24 Hrs  T(C): 37.3 (05 Mar 2021 17:05), Max: 38 (04 Mar 2021 20:31)  T(F): 99.1 (05 Mar 2021 17:05), Max: 100.4 (04 Mar 2021 20:31)  HR: 79 (05 Mar 2021 17:22) (78 - 93)  BP: 170/94 (05 Mar 2021 17:05) (155/83 - 170/94)  BP(mean): 105 (04 Mar 2021 20:15) (105 - 105)  RR: 16 (05 Mar 2021 17:05) (16 - 24)  SpO2: 98% (05 Mar 2021 17:22) (90% - 98%)    PHYSICAL EXAM:  GENERAL: NAD, well-groomed, well-developed  HEAD:  Atraumatic, Normocephalic  EYES: EOMI, PERRLA, conjunctiva and sclera clear  ENMT: No tonsillar erythema, exudates, or enlargement; Moist mucous membranes, Good dentition, No lesions  NECK: Supple, No JVD, Normal thyroid  NERVOUS SYSTEM:  Alert & Oriented X3, Good concentration; Motor Strength 5/5 B/L upper and lower extremities; DTRs 2+ intact and symmetric  CHEST/LUNG: Clear to percussion bilaterally; No rales, rhonchi, wheezing, or rubs  HEART: Regular rate and rhythm; No murmurs, rubs, or gallops  ABDOMEN: Soft, Nontender, Nondistended; Bowel sounds present  EXTREMITIES:  2+ Peripheral Pulses, No clubbing, cyanosis, or edema  LYMPH: No lymphadenopathy noted  SKIN: No rashes or lesions    LABS:                        11.3   10.05 )-----------( 314      ( 05 Mar 2021 06:16 )             36.0     03-05    140  |  99  |  16  ----------------------------<  121<H>  3.3<L>   |  38<H>  |  0.59    Ca    8.2<L>      05 Mar 2021 06:16  Phos  2.5     03-05  Mg     2.0     03-05        Urinalysis Basic - ( 04 Mar 2021 22:26 )    Color: Yellow / Appearance: Clear / S.010 / pH: x  Gluc: x / Ketone: Negative  / Bili: Negative / Urobili: Negative mg/dL   Blood: x / Protein: 15 mg/dL / Nitrite: Negative   Leuk Esterase: Negative / RBC: 0-2 /HPF / WBC 0-2   Sq Epi: x / Non Sq Epi: Occasional / Bacteria: Negative      CAPILLARY BLOOD GLUCOSE      POCT Blood Glucose.: 102 mg/dL (05 Mar 2021 16:41)  POCT Blood Glucose.: 134 mg/dL (05 Mar 2021 11:25)  POCT Blood Glucose.: 113 mg/dL (05 Mar 2021 07:37)  POCT Blood Glucose.: 162 mg/dL (04 Mar 2021 20:46)      RADIOLOGY & ADDITIONAL TESTS:    Imaging Personally Reviewed:  [ ] YES  [ ] NO    Consultant(s) Notes Reviewed:  [ ] YES  [ ] NO    Care Discussed with Consultants/Other Providers [ ] YES  [ ] NO

## 2021-03-10 NOTE — PROGRESS NOTE ADULT - PROVIDER SPECIALTY LIST ADULT
Critical Care
Nephrology
Critical Care
Hospitalist
Hospitalist
Critical Care
Hospitalist

## 2021-03-10 NOTE — PROGRESS NOTE ADULT - ASSESSMENT
55 year old man with a past medical history of asthma, severe COPD, DM and HTN. Pt was bought in by EMS on 2/25 with complaints of generalized weakness, SOB and chest congestion. Found to be hypoxic and hypercapnic. Was placed on BIPAP with no improvement then got intubated. Admitted for acute on chronic respiratory failure likely secondary to COPD exacerbation. Post intubation patient was very hypoxemic and hypotensive and found to have large R pneumothorax. Emergent decompression done followed by chest tube placement. Patient was on levophed as well. Also started on course of steroids for COPD exacerbation and antibiotics for possible pneumonia. Patient improved and was extubated on 3/1. Chest tube was removed on 3/2. No more pneumothorax on cxr. Patient had an episode of delirium which he was placed on Precedex drip which is now off. Patient on regular diet. On nasal cannula 2L.     Dischrge plan is to subacute rehab now on room air 
55 year old man with a past medical history of asthma, severe COPD, DM and HTN. Pt was bought in by EMS on 2/25 with complaints of generalized weakness, SOB and chest congestion. Found to be hypoxic and hypercapnic. Was placed on BIPAP with no improvement then got intubated. Admitted for acute on chronic respiratory failure likely secondary to COPD exacerbation. Post intubation patient was very hypoxemic and hypotensive and found to have large R pneumothorax. Emergent decompression done followed by chest tube placement. Patient was on levophed as well. Also started on course of steroids for COPD exacerbation and antibiotics for possible pneumonia. Patient improved and was extubated on 3/1. Chest tube was removed on 3/2. No more pneumothorax on cxr. Patient had an episode of delirium which he was placed on Precedex drip which is now off. Patient on regular diet. On nasal cannula 2L.     Dischrge plan is to subacute rehab now on room air 
55 year old man with a past medical history of asthma, severe COPD, DM and HTN. Pt was bought in by EMS on 2/25 with complaints of generalized weakness, SOB and chest congestion. Found to be hypoxic and hypercapnic. Was placed on BIPAP with no improvement then got intubated. Admitted for acute on chronic respiratory failure likely secondary to COPD exacerbation. Post intubation patient was very hypoxemic and hypotensive and found to have large R pneumothorax. Emergent decompression done followed by chest tube placement. Patient was on levophed as well. Also started on course of steroids for COPD exacerbation and antibiotics for possible pneumonia. Patient improved and was extubated on 3/1. Chest tube was removed on 3/2. No more pneumothorax on cxr. Patient had an episode of delirium which he was placed on Precedex drip which is now off. Patient on regular diet. On nasal cannula 2L. Patient seen by ICU attending and deemed stable for transfer to the medical floor.       Dischrge plan is to subacute rehab 
55 year old man with a past medical history of asthma, severe COPD, DM and HTN. Pt was bought in by EMS on 2/25 with complaints of generalized weakness, SOB and chest congestion. Found to be hypoxic and hypercapnic. Was placed on BIPAP with no improvement then got intubated. Admitted for acute on chronic respiratory failure likely secondary to COPD exacerbation. Post intubation patient was very hypoxemic and hypotensive and found to have large R pneumothorax. Emergent decompression done followed by chest tube placement. Patient was on levophed as well. Also started on course of steroids for COPD exacerbation and antibiotics for possible pneumonia. Patient improved and was extubated on 3/1. Chest tube was removed on 3/2. No more pneumothorax on cxr. Patient had an episode of delirium which he was placed on Precedex drip which is now off. Patient on regular diet. On nasal cannula 2L.       Nutritional Assessment:  · Nutritional Assessment	This patient has been assessed with a concern for Malnutrition and has been determined to have a diagnosis/diagnoses of Severe protein-calorie malnutrition.    This patient is being managed with:   Diet Consistent Carbohydrate w/Evening Snack-  Entered: Mar  6 2021  2:40PM    Problem/Plan - 1:  ·  Problem: acute Respiratory failure with Hypoxia. and hypercapnia, resolved. now chronic respiratory failure with COPD requiring continuous oxygen.      Problem/Plan - 2:  ·  Problem: Pneumonia due to other aerobic gram-negative bacteria.  Plan: resolved in ICU.     Problem/Plan - 3:  ·  Problem: Severe protein-calorie malnutrition.  Plan: appreciate nutrition.     Problem/Plan - 4:  ·  Problem: Essential hypertension. controlled on current med. monitor.     Problem/Plan - 5:  ·  Problem: Type 2 diabetes mellitus without complication, unspecified whether long term insulin use.  controlled.      Problem/Plan - 6:  Problem: Pneumothorax, unspecified type. Plan: stable s/p chest tube.    Problem/Plan - 7:  ·  Problem: Chronic obstructive pulmonary disease, unspecified COPD type.  Plan: stable bronchodilators and o2 if needed.     Full code  DVT ppx  Dispo: pending home oxygen arrangement otherwise [facility]. discussed with nurse and care manager.   
55 year old man with a past medical history of asthma, severe COPD, DM and HTN. Pt was bought in by EMS on 2/25 with complaints of generalized weakness, SOB and chest congestion. Found to be hypoxic and hypercapnic. Was placed on BIPAP with no improvement then got intubated. Admitted for acute on chronic respiratory failure likely secondary to COPD exacerbation. Post intubation patient was very hypoxemic and hypotensive and found to have large R pneumothorax. Emergent decompression done followed by chest tube placement. Patient was on levophed as well. Also started on course of steroids for COPD exacerbation and antibiotics for possible pneumonia. Patient improved and was extubated on 3/1. Chest tube was removed on 3/2. No more pneumothorax on cxr. Patient had an episode of delirium which he was placed on Precedex drip which is now off. Patient on regular diet. On nasal cannula 2L.     Dischrge plan is to home with home o2 being arranged for AM 3/10 
55 year old man with a past medical history of asthma, severe COPD, DM and HTN. Pt was bought in by EMS on 2/25 with complaints of generalized weakness, SOB and chest congestion. Found to be hypoxic and hypercapnic. Was placed on BIPAP with no improvement then got intubated. Admitted for acute on chronic respiratory failure likely secondary to COPD exacerbation. Post intubation patient was very hypoxemic and hypotensive and found to have large R pneumothorax. Emergent decompression done followed by chest tube placement. Patient was on levophed as well. Also started on course of steroids for COPD exacerbation and antibiotics for possible pneumonia. Patient improved and was extubated on 3/1. Chest tube was removed on 3/2. No more pneumothorax on cxr. Patient had an episode of delirium which he was placed on Precedex drip which is now off. Patient on regular diet. On nasal cannula 2L.     Dischrge plan is to subacute rehab now on room air

## 2021-03-10 NOTE — CHART NOTE - NSCHARTNOTESELECT_GEN_ALL_CORE
Event Note
Event Note
Nutrition Services
Transfer Note
Event Note
Nutrition Services

## 2021-03-10 NOTE — CHART NOTE - NSCHARTNOTEFT_GEN_A_CORE
Pt admitted c weakness, cough, SOB; found c respiratory failure requiring intubation in the ER (2/26); successfully extubated 3/1. Per swallow eval (3/3) SLP recommended pureed c honey-thick liquids; upgraded to regular consistency by MD on 3/6. Pt remains confused, disoriented, agitated, restless.  PMHx includes T2DM, HTN, Asthma    Factors impacting intake: [X ] none [ ] nausea  [ ] vomiting [ ] diarrhea [ ] constipation  [ ]chewing problems [ ] swallowing issues  [ ] other:     Diet Prescription: Diet, Consistent Carbohydrate w/Evening Snack (03-06-21 @ 14:40)    Intake:   staff report pt eating well; % most meals    Current Weight: Weight (kg): 55.1 (3/8); admission wt 51.1 (2/26)  % Weight Change:  7.3% gain x 10 days    Last edema noted on 3/3; 3+ b/l hands; no edema present at admission    Pertinent Medications: MEDICATIONS  (STANDING):  albuterol/ipratropium for Nebulization 3 milliLiter(s) Nebulizer every 6 hours  amLODIPine   Tablet 10 milliGRAM(s) Oral daily  chlorhexidine 4% Liquid 1 Application(s) Topical <User Schedule>  dextrose 40% Gel 15 Gram(s) Oral once  dextrose 5%. 1000 milliLiter(s) (50 mL/Hr) IV Continuous <Continuous>  dextrose 5%. 1000 milliLiter(s) (100 mL/Hr) IV Continuous <Continuous>  dextrose 50% Injectable 25 Gram(s) IV Push once  dextrose 50% Injectable 25 Gram(s) IV Push once  dextrose 50% Injectable 12.5 Gram(s) IV Push once  enoxaparin Injectable 40 milliGRAM(s) SubCutaneous daily  glucagon  Injectable 1 milliGRAM(s) IntraMuscular once  insulin lispro (ADMELOG) corrective regimen sliding scale   SubCutaneous three times a day before meals  insulin lispro (ADMELOG) corrective regimen sliding scale   SubCutaneous at bedtime  nicotine - 21 mG/24Hr(s) Patch 1 patch Transdermal daily  polyethylene glycol 3350 17 Gram(s) Oral daily  senna 2 Tablet(s) Oral at bedtime  sodium chloride 3%  Inhalation 3 milliLiter(s) Inhalation every 6 hours    MEDICATIONS  (PRN):  acetaminophen    Suspension .. 650 milliGRAM(s) Oral every 6 hours PRN Temp greater or equal to 38C (100.4F), Mild Pain (1 - 3)  ALBUTerol    0.083% 2.5 milliGRAM(s) Nebulizer every 4 hours PRN Shortness of Breath and/or Wheezing  sodium chloride 0.9% lock flush 10 milliLiter(s) IV Push every 1 hour PRN Pre/post blood products, medications, blood draw, and to maintain line patency    Pertinent Labs:  03-07 Phos 2.9 mg/dL 03-07 Alb 2.8 g/dL<L>  02-28-21  A1C 7.3%; 03-09 POCT: 205, 165, 124, 139    Skin:   WDL    Estimated Needs:   [X ] no change since previous assessment  (2/26)  [ ] recalculated:     Previous Nutrition Diagnosis:   [X ] Severe Malnutrition in context of acute illness  Etiology:  Inadequate energy/protein intake related to respiratory failure requiring intubation, lethargy, AMS  Signs & Symptoms:  physical findings of severe fat depletion & muscle wasting as noted on 3/5 chart note    Nutrition Diagnosis is [X ] ongoing  [ ] resolved [ ] not applicable     New Nutrition Diagnosis: [x ] not applicable    Interventions:   continue current diet rx as noted  Recommend  [ ] Change Diet To:  [ ] Nutrition Supplement  [ ] Nutrition Support  [ ] Other:     Monitoring and Evaluation:   [X ] PO intake [ x ] Tolerance to diet prescription [ x ] weights [ x ] labs[ x ] follow up per protocol  [ ] other:

## 2021-03-10 NOTE — PROGRESS NOTE ADULT - REASON FOR ADMISSION
Adequate: hears normal conversation without difficulty
Hypoxic and hypercarbic respiratory failure

## 2021-03-10 NOTE — PROGRESS NOTE ADULT - NUTRITIONAL ASSESSMENT
This patient has been assessed with a concern for Malnutrition and has been determined to have a diagnosis/diagnoses of Moderate protein-calorie malnutrition.    This patient is being managed with:   Diet NPO-  Except Medications     Special Instructions for Nursing:  Except Medications  Entered: Feb 25 2021 10:48PM    The following pending diet order is being considered for treatment of Moderate protein-calorie malnutrition:  Diet NPO with Tube Feed-  Tube Feeding Modality: Orogastric  Vital AF 1.2  Total Volume for 24 Hours (mL): 960  Continuous  Starting Tube Feed Rate {mL per Hour}: 20  Increase Tube Feed Rate by (mL): 5     Every 8 hours  Until Goal Tube Feed Rate (mL per Hour): 40  Tube Feed Duration (in Hours): 24  Tube Feed Start Time: 13:00  Entered: Feb 26 2021 12:17PM  
This patient has been assessed with a concern for Malnutrition and has been determined to have a diagnosis/diagnoses of Moderate protein-calorie malnutrition.    This patient is being managed with:   Diet NPO-  Except Medications     Special Instructions for Nursing:  Except Medications  Entered: Feb 25 2021 10:48PM    
This patient has been assessed with a concern for Malnutrition and has been determined to have a diagnosis/diagnoses of Moderate protein-calorie malnutrition.    This patient is being managed with:   Diet NPO-  Except Medications     Special Instructions for Nursing:  Except Medications  Entered: Feb 25 2021 10:48PM    The following pending diet order is being considered for treatment of Moderate protein-calorie malnutrition:  Diet NPO with Tube Feed-  Tube Feeding Modality: Orogastric  Vital AF 1.2  Total Volume for 24 Hours (mL): 960  Continuous  Starting Tube Feed Rate {mL per Hour}: 20  Increase Tube Feed Rate by (mL): 5     Every 8 hours  Until Goal Tube Feed Rate (mL per Hour): 40  Tube Feed Duration (in Hours): 24  Tube Feed Start Time: 13:00  Entered: Feb 26 2021 12:17PM  
This patient has been assessed with a concern for Malnutrition and has been determined to have a diagnosis/diagnoses of Severe protein-calorie malnutrition.    This patient is being managed with:   Diet Consistent Carbohydrate w/Evening Snack-  Entered: Mar  6 2021  2:40PM    
This patient has been assessed with a concern for Malnutrition and has been determined to have a diagnosis/diagnoses of Moderate protein-calorie malnutrition.    This patient is being managed with:   Diet NPO-  Except Medications     Special Instructions for Nursing:  Except Medications  Entered: Feb 25 2021 10:48PM    The following pending diet order is being considered for treatment of Moderate protein-calorie malnutrition:  Diet NPO with Tube Feed-  Tube Feeding Modality: Orogastric  Vital AF 1.2  Total Volume for 24 Hours (mL): 960  Continuous  Starting Tube Feed Rate {mL per Hour}: 20  Increase Tube Feed Rate by (mL): 5     Every 8 hours  Until Goal Tube Feed Rate (mL per Hour): 40  Tube Feed Duration (in Hours): 24  Tube Feed Start Time: 13:00  Entered: Feb 26 2021 12:17PM  
This patient has been assessed with a concern for Malnutrition and has been determined to have a diagnosis/diagnoses of Severe protein-calorie malnutrition.    This patient is being managed with:   Diet Consistent Carbohydrate w/Evening Snack-  Entered: Mar  6 2021  2:40PM    
This patient has been assessed with a concern for Malnutrition and has been determined to have a diagnosis/diagnoses of Severe protein-calorie malnutrition.    This patient is being managed with:   Diet Consistent Carbohydrate w/Evening Snack-  Entered: Mar  6 2021  2:40PM    
This patient has been assessed with a concern for Malnutrition and has been determined to have a diagnosis/diagnoses of Severe protein-calorie malnutrition.    This patient is being managed with:   Diet Dysphagia 1 Pureed-Honey Consistency Fluid-  Supplement Feeding Modality:  Oral  Ensure Pudding Cans or Servings Per Day:  1       Frequency:  Three Times a day  Entered: Mar  5 2021  1:04PM    
This patient has been assessed with a concern for Malnutrition and has been determined to have a diagnosis/diagnoses of Severe protein-calorie malnutrition.    This patient is being managed with:   Diet Consistent Carbohydrate w/Evening Snack-  Entered: Mar  6 2021  2:40PM    
This patient has been assessed with a concern for Malnutrition and has been determined to have a diagnosis/diagnoses of Severe protein-calorie malnutrition.    This patient is being managed with:   Diet Consistent Carbohydrate w/Evening Snack-  Entered: Mar  6 2021  2:40PM

## 2021-03-21 DIAGNOSIS — J93.0 SPONTANEOUS TENSION PNEUMOTHORAX: ICD-10-CM

## 2021-03-21 DIAGNOSIS — E87.6 HYPOKALEMIA: ICD-10-CM

## 2021-03-21 DIAGNOSIS — N17.9 ACUTE KIDNEY FAILURE, UNSPECIFIED: ICD-10-CM

## 2021-03-21 DIAGNOSIS — R45.1 RESTLESSNESS AND AGITATION: ICD-10-CM

## 2021-03-21 DIAGNOSIS — J96.21 ACUTE AND CHRONIC RESPIRATORY FAILURE WITH HYPOXIA: ICD-10-CM

## 2021-03-21 DIAGNOSIS — J44.9 CHRONIC OBSTRUCTIVE PULMONARY DISEASE, UNSPECIFIED: ICD-10-CM

## 2021-03-21 DIAGNOSIS — J44.0 CHRONIC OBSTRUCTIVE PULMONARY DISEASE WITH (ACUTE) LOWER RESPIRATORY INFECTION: ICD-10-CM

## 2021-03-21 DIAGNOSIS — J15.6 PNEUMONIA DUE TO OTHER GRAM-NEGATIVE BACTERIA: ICD-10-CM

## 2021-03-21 DIAGNOSIS — E11.649 TYPE 2 DIABETES MELLITUS WITH HYPOGLYCEMIA WITHOUT COMA: ICD-10-CM

## 2021-03-21 DIAGNOSIS — E43 UNSPECIFIED SEVERE PROTEIN-CALORIE MALNUTRITION: ICD-10-CM

## 2021-03-21 DIAGNOSIS — R41.0 DISORIENTATION, UNSPECIFIED: ICD-10-CM

## 2021-03-21 DIAGNOSIS — E87.3 ALKALOSIS: ICD-10-CM

## 2021-03-21 DIAGNOSIS — I95.9 HYPOTENSION, UNSPECIFIED: ICD-10-CM

## 2021-03-21 DIAGNOSIS — E11.9 TYPE 2 DIABETES MELLITUS WITHOUT COMPLICATIONS: ICD-10-CM

## 2021-03-21 DIAGNOSIS — J96.22 ACUTE AND CHRONIC RESPIRATORY FAILURE WITH HYPERCAPNIA: ICD-10-CM

## 2021-03-21 DIAGNOSIS — J44.1 CHRONIC OBSTRUCTIVE PULMONARY DISEASE WITH (ACUTE) EXACERBATION: ICD-10-CM

## 2021-03-21 DIAGNOSIS — I10 ESSENTIAL (PRIMARY) HYPERTENSION: ICD-10-CM

## 2022-02-08 NOTE — PATIENT PROFILE ADULT - NSPROGENBLOODRESTRICT_GEN_A_NUR
Patient called stating at the moment he is in the hospital and would like to speak to Dr Sanon. Patient didn't specify any details.    none

## 2023-03-30 NOTE — ED PROVIDER NOTE - IV ALTEPLASE EXCL ABS HIDDEN
Goal Outcome Evaluation:  Plan of Care Reviewed With: patient        Progress: improving       SLP treatment completed. Will continue to address PMV. Please see note for further details and recommendations.     show

## 2023-12-19 NOTE — PHYSICAL THERAPY INITIAL EVALUATION ADULT - CRITERIA FOR SKILLED THERAPEUTIC INTERVENTIONS
Patient is here for a follow up appointment.   impairments found/functional limitations in following categories/risk reduction/prevention/rehab potential/therapy frequency/predicted duration of therapy intervention

## 2024-10-08 NOTE — PATIENT PROFILE ADULT - DO YOU NEED ADDITIONAL SERVICES TO MANAGE ANY OF THESE MEDICAL CONDITIONS AT HOME?
You were seen today in the Cardiology office for follow up.    No medication changes were made today. Please continue your current cardiac medications as prescribed.    Thank you for choosing [facility].    Central scheduling: [phone]       
no

## 2025-05-09 NOTE — AIRWAY REMOVAL NOTE  ADULT & PEDS - REASON ARTIFICAL AIRWAY REMOVED:
Called and spoke with pt.    Pt made aware of stress test results and Dr. Garcia's recommendations.     Pt advised to monitor BP twice daily for a week and send us those readings.     Pt stated he will try to get a BP machine soon and then start checking his BP at home.    reason for artificial airway has resolved